# Patient Record
Sex: FEMALE | Race: BLACK OR AFRICAN AMERICAN | NOT HISPANIC OR LATINO | Employment: OTHER | URBAN - NONMETROPOLITAN AREA
[De-identification: names, ages, dates, MRNs, and addresses within clinical notes are randomized per-mention and may not be internally consistent; named-entity substitution may affect disease eponyms.]

---

## 2017-01-23 ENCOUNTER — TELEPHONE (OUTPATIENT)
Dept: ORTHOPEDIC SURGERY | Facility: CLINIC | Age: 82
End: 2017-01-23

## 2017-01-23 ENCOUNTER — OFFICE VISIT (OUTPATIENT)
Dept: ORTHOPEDIC SURGERY | Facility: CLINIC | Age: 82
End: 2017-01-23

## 2017-01-23 VITALS — HEIGHT: 60 IN | BODY MASS INDEX: 34.55 KG/M2 | WEIGHT: 176 LBS

## 2017-01-23 DIAGNOSIS — M17.11 PRIMARY OSTEOARTHRITIS OF RIGHT KNEE: Primary | ICD-10-CM

## 2017-01-23 PROCEDURE — 99204 OFFICE O/P NEW MOD 45 MIN: CPT | Performed by: ORTHOPAEDIC SURGERY

## 2017-01-23 RX ORDER — HYDROCODONE BITARTRATE AND ACETAMINOPHEN 5; 325 MG/1; MG/1
1 TABLET ORAL EVERY 6 HOURS PRN
Qty: 30 TABLET | Refills: 0 | Status: SHIPPED | OUTPATIENT
Start: 2017-01-23 | End: 2017-03-25 | Stop reason: HOSPADM

## 2017-01-23 NOTE — MR AVS SNAPSHOT
Rebecca Peterson   2017 1:45 PM   Office Visit    Dept Phone:  [phone]   Encounter #:  [id number]    Provider:  Eleazar Head MD   Department:  [facility]                Your Full Care Plan              Today's Medication Changes          These changes are accurate as of: 17  3:04 PM.  If you have any questions, ask your nurse or doctor.               New Medication(s)Ordered:     HYDROcodone-acetaminophen 5-325 MG per tablet   Commonly known as:  NORCO   Take 1 tablet by mouth Every 6 (Six) Hours As Needed for moderate pain (4-6).   Started by:  Eleazar Head MD            Where to Get Your Medications      You can get these medications from any pharmacy     Bring a paper prescription for each of these medications     HYDROcodone-acetaminophen 5-325 MG per tablet                  Your Updated Medication List          This list is accurate as of: 17  3:04 PM.  Always use your most recent med list.                HYDROcodone-acetaminophen 5-325 MG per tablet   Commonly known as:  NORCO   Take 1 tablet by mouth Every 6 (Six) Hours As Needed for moderate pain (4-6).               Instructions     None    Patient Instructions History      Upcoming Appointments     Visit Type Date Time Department    NEW PATIENT 2017  1:45 PM [facility]    FOLLOW UP 2/15/2017  3:45 PM [facility]      ESCAPESwithYOUhart Signup     [facility] Listar allows you to send messages to your doctor, view your test results, renew your prescriptions, schedule appointments, and more. To sign up, go to [employer] and click on the Sign Up Now link in the New User? box. Enter your Listar Activation Code exactly as it appears below along with the last four digits of your Social Security Number and your Date of Birth () to complete the sign-up process. If you do not sign up before the expiration date, you must request a new  "code.    Wall[employer] Activation Code: [email or web address]  Expires: 2/6/2017  3:04 PM    If you have questions, you can email [email or web address] or call [phone] to talk to our Wall[employer] staff. Remember, Wallmobhart is NOT to be used for urgent needs. For medical emergencies, dial 911.               Other Info from Your Visit           Your Appointments     Feb 15, 2017  3:45 PM EST   Follow Up with Eleazar Head MD   [facility] (--)    [address] [place] [address]   [phone]           Arrive 15 minutes prior to appointment.              Allergies     No Known Allergies      Reason for Visit     Right Knee - Pain, Edema           Vital Signs     Height Weight Body Mass Index Smoking Status          60\" (152.4 cm) 176 lb (79.8 kg) 34.37 kg/m2 Never Smoker          "

## 2017-01-23 NOTE — TELEPHONE ENCOUNTER
Patient family is asking if we can send in a order to [facility] to come to the patients home to help. Patient is wanting surgery as soon as possible they are willing to have it done at [facility] to speed up the insurance process they understand that [facility] has certain guidelines for Medicare.

## 2017-01-23 NOTE — PROGRESS NOTES
"Chief Complaint   Patient presents with   • Right Knee - Pain, Edema   Patient is here today after a visit to the [facility] Over the weekend. She states that she was trying to get into a car last Thursday and heard her right knee \"POP\" and then it started to swell and become tight. It has gotten worse and is very painful. She cannot bear weight.          HPI patient is a 85-year-old -American patient is having increasing pain and discomfort with both her knees.  The right is more symptomatic than left.  Her symptoms have been going on for at least 5 years and she has been reluctant to see any physician or seek any medical care.  She is progressively getting limited in her mobility and is getting confined to the wheelchair.  She describes her pain is very severe and on the lateral aspect the knee.  She is unable to fully extend her knee.  She cannot flex her knee beyond 90°.  She has not fallen recently but all of a sudden this weekend developed sharp, excruciating pain on the lateral aspect the knee.  She was unable to get out of her wheelchair or bear any weight whatsoever.  She was evaluated in the emergency room and a duplex Doppler scan for DVT was negative as well.  She states that her quality of life is miserable and she is so dependent on of the people for her activities of daily living that she would rather not so for this in the [place] die.  I have reassured her that the knee arthritis and cause severe pain and it is very eminently fixable with a knee arthroplasty.  She states that her mind is very clear and she wants to go ahead with the surgery ASAP to minimize her disability from this debilitating disease in her knees.          Allergies no known allergies      Social History     Social History   • Marital status:      Spouse name: N/A   • Number of children: N/A   • Years of education: N/A     Occupational History   • Not on file.     Social History Main Topics   • Smoking status: " Never Smoker   • Smokeless tobacco: Not on file   • Alcohol use No   • Drug use: Not on file   • Sexual activity: Not on file     Other Topics Concern   • Not on file     Social History Narrative   • No narrative on file       No family history on file.    No past surgical history on file.    No past medical history on file.        There were no vitals filed for this visit.          Review of Systems   Constitutional: Negative for chills, diaphoresis, fever and unexpected weight change.   HENT: Negative for hearing loss, nosebleeds, sore throat and tinnitus.    Eyes: Negative for pain and visual disturbance.   Respiratory: Negative for cough, shortness of breath and wheezing.    Cardiovascular: Negative for chest pain and palpitations.   Gastrointestinal: Negative for abdominal pain, diarrhea, nausea and vomiting.   Endocrine: Negative for cold intolerance, heat intolerance and polydipsia.   Genitourinary: Negative for difficulty urinating, dysuria and hematuria.   Musculoskeletal: Negative for arthralgias, joint swelling and myalgias.   Skin: Negative for rash and wound.   Allergic/Immunologic: Negative for environmental allergies.   Neurological: Negative for dizziness, syncope and numbness.   Hematological: Does not bruise/bleed easily.   Psychiatric/Behavioral: Negative for dysphoric mood and sleep disturbance. The patient is not nervous/anxious.            Physical Exam   Constitutional: She is oriented to person, place, and time. She appears well-developed and well-nourished.   HENT:   Head: Normocephalic.   Eyes: Conjunctivae are normal. Pupils are equal, round, and reactive to light.   Neck: Normal range of motion. Neck supple. No JVD present.   Cardiovascular: Normal rate, normal heart sounds and intact distal pulses.    Pulmonary/Chest: Effort normal and breath sounds normal. No respiratory distress.   Abdominal: Soft. Bowel sounds are normal. There is no tenderness. There is no guarding.    Lymphadenopathy:     She has no cervical adenopathy.   Neurological: She is alert and oriented to person, place, and time. She has normal reflexes.   Skin: Skin is warm and dry.   Psychiatric: Her behavior is normal. Judgment and thought content normal.   Vitals reviewed.              Joint/Body Part Specific Exam:  right knee. Positive grind test. Pain and tenderness is present over the lateral aspect of the knee. Patient has some tenderness and irritability of the common peroneal nerve. Lateral joint line is exquisitely painful and tender. Patella is tending to track a little bit laterally. There is thickening of the synovial membrane. Range of motion in flexion is 0-90 degrees. Dorsalis pedis and posterior tibial artery pulses are palpable. Common peroneal nerve function is well preserved. Gait is cautious and antalgic. The patient has valgus orientation of the knee with a high degree of external rotation than the contralateral side.  Patient is in severe pain when any attempt to move the leg causes her to have a lot of pain and discomfort.  I would not be surprised if she has a subclinical fracture of either the proximal tibia or the distal femur because off the poor quality of the bone and I have discussed that with the family.          X-RAY Report:  Films from the emergency room at the hospital show complete loss of lateral joint space with osteophyte formation.  She has bone-on-bone appearance.  The possibility of a hairline fracture cannot be ruled out.  The patellofemoral distance is completely obliterated.          Diagnostics:            Rebecca was seen today for pain and edema.    Diagnoses and all orders for this visit:    Primary osteoarthritis of right knee    Other orders  -     HYDROcodone-acetaminophen (NORCO) 5-325 MG per tablet; Take 1 tablet by mouth Every 6 (Six) Hours As Needed for moderate pain (4-6).          Procedures          I provided this patient with educational materials  regarding exercise and bone health.        Plan:   Patient has had severe joint pain in the right knee with limited range of motion and progressively getting confined to the wheelchair.    Patient and her family want to proceed with a right total knee arthroplasty.    Risks are high and I have discussed those with the patient and her family in great detail.    Time spent in the office with the patient discussing pros and cons of surgical intervention and making the decision today is 45 minutes.    We will schedule a total knee arthroplasty for this patient.    The patient was seen in the office today for preoperative discussion.  The patient has been tried on over-the-counter and prescription NSAID's despite the risks of anti-inflammatory bleeding, peptic ulcers and erosive gastritis with short term benefit only.  Braces have been prescribed for mechanical support.  Patient has been participating in an exercise program specifically targeting joint pain relief with limited benefit. Intraarticular injections have been used periodically with some but not complete relief of pain.  Ambulation aids have also been utilized.      The details of the surgical procedure were explained including the location of probable incisions and a description of the likely hardware/grafts to be used. The patient understands the likely convalescence after surgery as well as the rehabilitation required.  Also, we have thoroughly discussed with the patient the risks, benefits and alternatives to surgery.  Risks include but are not limited to the risk of infection, joint stiffness, limited range of motion, wound healing problems, scar tissue build up, myocardial infarction, stroke, blood clots (including DVT and/or pulmonary embolus along with the risk of death) neurologic and/or vascular injury, limb length discrepancy, fracture, dislocation, nonunion, malunion, continued pain and need for further surgery including hardware failure requiring  revision.     Controlled substance treatment options discussed in detail. Patient's signed consent to medical options on file. RAMONA form in chart. Risks of narcotic medication usage outlined.  Possibility of physical and psychological dependence and abuse, especially long term, emphasized to the patient.      CC To Pollo Dahl MD

## 2017-01-25 ENCOUNTER — TELEPHONE (OUTPATIENT)
Dept: ORTHOPEDIC SURGERY | Facility: CLINIC | Age: 82
End: 2017-01-25

## 2017-01-25 NOTE — TELEPHONE ENCOUNTER
Patient;s daughter called and states that they are wanting to know when surgery is scheduled. She states that with it being Medicare she would rather the surgery be done in [place] if it will be approved sooner. It appears the note isn;t completed yet and there is no referral yet for surgery. She is also asking for an order for a transport chair. She states that she checked with the patient's PCP and they said since you are the one treating her it needs to come from you. Please advise/rj.

## 2017-01-28 RX ORDER — CEFAZOLIN SODIUM 2 G/100ML
2 INJECTION, SOLUTION INTRAVENOUS ONCE
Status: CANCELLED | OUTPATIENT
Start: 2017-01-28 | End: 2017-01-28

## 2017-01-28 NOTE — TELEPHONE ENCOUNTER
I have entered orders for the patient's right total knee arthroplasty.  Please let her and her family know.

## 2017-01-30 ENCOUNTER — TELEPHONE (OUTPATIENT)
Dept: ORTHOPEDIC SURGERY | Facility: CLINIC | Age: 82
End: 2017-01-30

## 2017-01-30 NOTE — TELEPHONE ENCOUNTER
Patient's PCP needs to do that because the Lasix need to be associated with potassium and blood work especially Chem-7 to make sure she does not get electrolyte imbalance

## 2017-01-30 NOTE — TELEPHONE ENCOUNTER
Spoke to the family regarding Lasix and the need for the PCP to order and manager if indeed the patient needs it.

## 2017-01-31 DIAGNOSIS — M17.11 PRIMARY OSTEOARTHRITIS OF RIGHT KNEE: Primary | ICD-10-CM

## 2017-02-15 ENCOUNTER — OFFICE VISIT (OUTPATIENT)
Dept: ORTHOPEDIC SURGERY | Facility: CLINIC | Age: 82
End: 2017-02-15

## 2017-02-15 DIAGNOSIS — M17.11 PRIMARY OSTEOARTHRITIS OF RIGHT KNEE: Primary | ICD-10-CM

## 2017-02-15 PROCEDURE — 99214 OFFICE O/P EST MOD 30 MIN: CPT | Performed by: ORTHOPAEDIC SURGERY

## 2017-02-15 NOTE — PROGRESS NOTES
Chief Complaint   Patient presents with   • Right Knee - Follow-up   Patient is here today to discuss surgery. She is scheduled for a right total knee replacement in March.      HPI patient has a lot of pain and discomfort on the medial aspect of the right knee.  The knee anaya and gives out from underneath her.  She is fallen several different times.  She has difficulty in going up and down the steps.  Difficulty with squatting on the ground.  She has a sense of tightness in the knee when she gets an effusion and is unable to squat on the ground.  She's tried intra-articular injections, physical therapy, and anti-inflammatory medication, a brace but still cannot get adequate relief of her symptoms.  She is now looking for more durable relief of symptoms to enjoy her quality of life and wants to proceed with a total knee arthroplasty.  She does understand that she is 85 and the risks off a procedure of this magnitude are substantial but she feels like it is worth it for her.            There were no vitals filed for this visit.        Review of Systems   Constitutional: Negative for chills, diaphoresis, fever and unexpected weight change.   HENT: Negative for hearing loss, nosebleeds, sore throat and tinnitus.    Eyes: Negative for pain and visual disturbance.   Respiratory: Negative for cough, shortness of breath and wheezing.    Cardiovascular: Negative for chest pain and palpitations.   Gastrointestinal: Negative for abdominal pain, diarrhea, nausea and vomiting.   Endocrine: Negative for cold intolerance, heat intolerance and polydipsia.   Genitourinary: Negative for difficulty urinating, dysuria and hematuria.   Musculoskeletal: Negative for arthralgias, joint swelling and myalgias.   Skin: Negative for rash and wound.   Allergic/Immunologic: Negative for environmental allergies.   Neurological: Negative for dizziness, syncope and numbness.   Hematological: Does not bruise/bleed easily.    Psychiatric/Behavioral: Negative for dysphoric mood and sleep disturbance. The patient is not nervous/anxious.            Physical Exam   Constitutional: She is oriented to person, place, and time. She appears well-developed and well-nourished.   HENT:   Head: Normocephalic.   Eyes: Conjunctivae are normal. Pupils are equal, round, and reactive to light.   Neck: Normal range of motion. Neck supple. No JVD present.   Cardiovascular: Normal rate, normal heart sounds and intact distal pulses.    Pulmonary/Chest: Effort normal and breath sounds normal. No respiratory distress.   Abdominal: Soft. Bowel sounds are normal. There is no tenderness. There is no guarding.   Lymphadenopathy:     She has no cervical adenopathy.   Neurological: She is alert and oriented to person, place, and time. She has normal reflexes.   Skin: Skin is warm and dry.   Psychiatric: She has a normal mood and affect. Her behavior is normal. Judgment and thought content normal.   Vitals reviewed.          Joint/Body Part Specific Exam:  right knee. Patient has crepitus throughout range of motion. Positive patellar grind test. Mild effusion. Lachman is negative. Pivot shift is negative. Anterior and posterior drawer signs are negative. Significant joint line tenderness is noted on the medial aspect of the knee. Patient has a varus orientation of the knee. Range of motion in flexion is for 0- 110° degrees. Neurovascular status intact.  Dorsalis pedis and posterior tibial artery pulses are palpable. Common peroneal nerve function is well preserved. Patients gait is cautious and antalgic. Skin and soft tissues are swollen; consistent with synovitis and effusion      X-RAY Report:  Prior films show complete loss of medial joint space with varus alignment.  Osteophyte formation is noted.  There is subchondral cyst formation with some osteopenia.      Diagnostics:        Rebecca was seen today for follow-up.    Diagnoses and all orders for this  visit:    Primary osteoarthritis of right knee          Procedures        Plan:  Patient has tried every form of conservative nonoperative care now wants to proceed with a total knee arthroplasty in March 2017.    Use a supportive brace to the knee.    Tablet ibuprofen 600 mg tab 1 by mouth twice a day when necessary pain and discomfort.    Schedule a right total knee arthroplasty.    The patient was seen in the office today for preoperative discussion.  The patient has been tried on over-the-counter and prescription NSAID's despite the risks of anti-inflammatory bleeding, peptic ulcers and erosive gastritis with short term benefit only.  Braces have been prescribed for mechanical support.  Patient has been participating in an exercise program specifically targeting joint pain relief with limited benefit. Intraarticular injections have been used periodically with some but not complete relief of pain.  Ambulation aids have also been utilized.      The details of the surgical procedure were explained including the location of probable incisions and a description of the likely hardware/grafts to be used. The patient understands the likely convalescence after surgery as well as the rehabilitation required.  Also, we have thoroughly discussed with the patient the risks, benefits and alternatives to surgery.  Risks include but are not limited to the risk of infection, joint stiffness, limited range of motion, wound healing problems, scar tissue build up, myocardial infarction, stroke, blood clots (including DVT and/or pulmonary embolus along with the risk of death) neurologic and/or vascular injury, limb length discrepancy, fracture, dislocation, nonunion, malunion, continued pain and need for further surgery including hardware failure requiring revision.     Time spent in the office today with the patient making the surgical decision and discussing risks and benefits 30 minutes

## 2017-03-08 NOTE — PROGRESS NOTES
Pre-Operative Orthopedic Assessment      Patient: Rebecca Peterson    YOB: 1931      Age/Gender: 85 y.o. female  Medical Record Number: [id number]  Surgical Procedure Planned: ID TOTAL KNEE ARTHROPLASTY [18257] (TOTAL KNEE ARTHROPLASTY with [place] [facility])     Surgeon: Eleazar Head MD    Date of Surgery Planned: 3/21/2017    Question Value Score    Patient Score   1. What is your age group? 50-65 years  66-75 years  >75 years =2  =1  =0 0   2. Gender? Male  Female =2  =1 1   3. How far on average can you walk? (a block is 200 meters) Two blocks or more (+\- rest)  1-2 blocks (+\- rest)  Housebound (most of time) =2  =1  =0 0   4. Which gait aid to you use? (more often than not) None  Single-Point Stick  Crutches/Frame =2  =1  =0 0   5. Do you use community  supports? (home help, meals on wheels, district nursing) None or one per week  Two or more per week =1  =0 1   6. Will you live with someone who can care for you after your operation? Yes  No =3  =0 3      Your Score (out of 12)  5     Key Destination at Discharge from acute care predicted by score   Scores < 6  -- extended inpatient rehabilitation   Scores 6-9 -- additional intervention to discharge directly home (Rehabilitation in home)   Scores > 9 -- directly home         Discharge Disposition/Planning:     Patient Response   Discussed the Predicted discharge disposition needed based on RAPT Assessment with the patient.    yes   Patient selected discharge disposition:   Home vs SNF ([facility])   Out Patient Rehabilitation Facility of Choice:    n/a   Home Health Services Preferred:   undecided   Has the patient completed or been scheduled to complete pre-operative testing? Scheduled for 3/14/17   Post-Operative Care Giver Name and Phone Number:    Reta Titus [phone]     Subacute Inpatient Rehabilitation:  Complete this section only if planning inpatient services at a Subacute Facility     Patient Response    Subacute Facility Preferred (Please list 2 facilities:   [facility] (plans to pre register)   Requires pre-certification for inpatient rehabilitation services?      No (Medicare)   Planned source of transportation to inpatient rehabilitation facility?   undecided    If choosing inpatient services at an Acute or Subacute Facility please list a subsequent back-up plan (in case patient fails to qualify for inpatient rehabilitation). Back-up plans should include caregiver (family member or friend) for first 24-48 post- -operatively.    yes   Home Equipment Patient Response   Does patient have a walker for home use?    yes   Does patient have a 3 in 1 commode for home use?    yes   Does patient have a shower chair for home use?    yes   Does patient have an elevated commode seat for home use? Bedside commode   Does patient have a cane for home use?    yes   Is there any other medical equipment in the home? If so,  List in comment section below wheelchair   Pre-Operative Class Attendance Patient Response   Attended or scheduled to attend the pre-operative class within 1 year of total joint replacement? Provided info, was unaware of class, plans to attend on day of PAT   Not planning to attend the pre-operative class    n/a   Has attended the pre-operative class > 1 year ago    n/a   Does not want to attend the class    n/a   Was misinformed that did not need to attend the class    n/a   Class time is not convenient    n/a   Other reasons for refusing to attend the pre-operative class (if yes, see comments below)   n/a   Patient Education  Completed   Expected time of discharge discussed yes   Encouraged to attend Pre-Operative Class    yes   Education re: Back-up plan for patients planning discharge to subacute facilities yes   Education re: Predicted Discharge Disposition based on RAPT score    yes   Patient receptive and voiced understanding of information given    yes                                                                                                             Comments:    Spoke with patient dtr Shiloh Sheffield ( [phone] ).  She is noted on [facility] consent in Media.  She informs that patient does live at [address] [place] [address].  Single level house with 1 step to the entry.  No basement.  Uses a walker and a cane, inside the house, mostly the walker.  They do also have a wheelchair that they have been using if they take the patient out.  She stated that they have been using a wheelchair transport van recently since patient having knee pain, as riding in a tight car has been very uncomfortable for patient. Shiloh informs that Reta Titus (dtr) (lives with patient) and will be staying with patient in hospital, and also able to help her at discharge.  She mentioned possibly needing a hospital bed at discharge/would prefer to use [facility].  Also mentioned that they have also considered a short time rehab stay at [facility].  Recommended to Shiloh to pre register prior to surgery if they are possibly considering [facility]- provided her contact # for [facility] [address] [id number] [phone].  Current discharge plan is home with assist of family and home health (would need their preference on HH agency- Shiloh informs that she has spoken with someone already, but is unsure of name of agency at this time, VS going to [facility].  CCP will follow backup with patient/family re discharge planning after OR.  Mila Rogel RN                                               Signature: Mila Rogel RN    Date:  3/8/2017

## 2017-03-14 ENCOUNTER — APPOINTMENT (OUTPATIENT)
Dept: PREADMISSION TESTING | Facility: HOSPITAL | Age: 82
End: 2017-03-14

## 2017-03-14 VITALS
DIASTOLIC BLOOD PRESSURE: 72 MMHG | TEMPERATURE: 98.1 F | HEIGHT: 60 IN | BODY MASS INDEX: 32.53 KG/M2 | HEART RATE: 91 BPM | RESPIRATION RATE: 16 BRPM | SYSTOLIC BLOOD PRESSURE: 137 MMHG | WEIGHT: 165.7 LBS | OXYGEN SATURATION: 99 %

## 2017-03-14 LAB
ANION GAP SERPL CALCULATED.3IONS-SCNC: 14.3 MMOL/L
BILIRUB UR QL STRIP: NEGATIVE
BUN BLD-MCNC: 17 MG/DL (ref 8–23)
BUN/CREAT SERPL: 23.6 (ref 7–25)
CALCIUM SPEC-SCNC: 10.1 MG/DL (ref 8.6–10.5)
CHLORIDE SERPL-SCNC: 103 MMOL/L (ref 98–107)
CLARITY UR: CLEAR
CO2 SERPL-SCNC: 23.7 MMOL/L (ref 22–29)
COLOR UR: YELLOW
CREAT BLD-MCNC: 0.72 MG/DL (ref 0.57–1)
DEPRECATED RDW RBC AUTO: 46.5 FL (ref 37–54)
ERYTHROCYTE [DISTWIDTH] IN BLOOD BY AUTOMATED COUNT: 15 % (ref 11.7–13)
GFR SERPL CREATININE-BSD FRML MDRD: 93 ML/MIN/1.73
GLUCOSE BLD-MCNC: 213 MG/DL (ref 65–99)
GLUCOSE UR STRIP-MCNC: NEGATIVE MG/DL
HCT VFR BLD AUTO: 34.1 % (ref 35.6–45.5)
HGB BLD-MCNC: 10.7 G/DL (ref 11.9–15.5)
HGB UR QL STRIP.AUTO: NEGATIVE
KETONES UR QL STRIP: NEGATIVE
LEUKOCYTE ESTERASE UR QL STRIP.AUTO: NEGATIVE
MCH RBC QN AUTO: 26.6 PG (ref 26.9–32)
MCHC RBC AUTO-ENTMCNC: 31.4 G/DL (ref 32.4–36.3)
MCV RBC AUTO: 84.6 FL (ref 80.5–98.2)
NITRITE UR QL STRIP: NEGATIVE
PH UR STRIP.AUTO: 6 [PH] (ref 5–8)
PLATELET # BLD AUTO: 311 10*3/MM3 (ref 140–500)
PMV BLD AUTO: 8.4 FL (ref 6–12)
POTASSIUM BLD-SCNC: 4.1 MMOL/L (ref 3.5–5.2)
PROT UR QL STRIP: NEGATIVE
RBC # BLD AUTO: 4.03 10*6/MM3 (ref 3.9–5.2)
SODIUM BLD-SCNC: 141 MMOL/L (ref 136–145)
SP GR UR STRIP: 1.02 (ref 1–1.03)
UROBILINOGEN UR QL STRIP: NORMAL
WBC NRBC COR # BLD: 8.32 10*3/MM3 (ref 4.5–10.7)

## 2017-03-14 PROCEDURE — 80048 BASIC METABOLIC PNL TOTAL CA: CPT | Performed by: ORTHOPAEDIC SURGERY

## 2017-03-14 PROCEDURE — G8978 MOBILITY CURRENT STATUS: HCPCS | Performed by: PHYSICAL THERAPIST

## 2017-03-14 PROCEDURE — 36415 COLL VENOUS BLD VENIPUNCTURE: CPT

## 2017-03-14 PROCEDURE — G8979 MOBILITY GOAL STATUS: HCPCS | Performed by: PHYSICAL THERAPIST

## 2017-03-14 PROCEDURE — G8980 MOBILITY D/C STATUS: HCPCS | Performed by: PHYSICAL THERAPIST

## 2017-03-14 PROCEDURE — 93005 ELECTROCARDIOGRAM TRACING: CPT

## 2017-03-14 PROCEDURE — 97161 PT EVAL LOW COMPLEX 20 MIN: CPT | Performed by: PHYSICAL THERAPIST

## 2017-03-14 PROCEDURE — 81003 URINALYSIS AUTO W/O SCOPE: CPT | Performed by: ORTHOPAEDIC SURGERY

## 2017-03-14 PROCEDURE — 85027 COMPLETE CBC AUTOMATED: CPT | Performed by: ORTHOPAEDIC SURGERY

## 2017-03-14 RX ORDER — TRIAMTERENE AND HYDROCHLOROTHIAZIDE 37.5; 25 MG/1; MG/1
1 TABLET ORAL
COMMUNITY
End: 2021-06-02 | Stop reason: HOSPADM

## 2017-03-14 RX ORDER — LOSARTAN POTASSIUM 100 MG/1
50 TABLET ORAL
COMMUNITY

## 2017-03-14 RX ORDER — PANTOPRAZOLE SODIUM 20 MG/1
20 TABLET, DELAYED RELEASE ORAL AS NEEDED
COMMUNITY

## 2017-03-14 RX ORDER — AMLODIPINE BESYLATE 2.5 MG/1
2.5 TABLET ORAL
COMMUNITY

## 2017-03-14 RX ORDER — SIMVASTATIN 40 MG
40 TABLET ORAL NIGHTLY
Status: ON HOLD | COMMUNITY
End: 2021-06-01 | Stop reason: SDUPTHER

## 2017-03-14 NOTE — DISCHARGE INSTRUCTIONS
PLEASE ARRIVE AT 10:30 AM ON 3/21/2017      Take the following medications the morning of surgery with a small sip of water.  NO MEDS        General Instructions:  • Do not eat or drink after midnight: includes water, mints, or gum. You may brush your teeth.  • Do not smoke, chew tobacco, or drink alcohol.  • The Pre-Admission Testing nurse will instruct you to bring medications if unable to obtain an accurate list in Pre-Admission Testing.    • If applicable bring your C-PAP/ BI-PAP machine.  • Bring any papers given to you in the doctor’s office.  • Wear clean comfortable clothes and socks.  • Do not wear contact lenses or make-up.  Bring a case for your glasses if applicable.   • Bring crutches or walker if applicable.  • Leave all other valuables and jewelry at home.    If you were given a blood bank ID arm band remember to bring it with you the day of surgery.    Preventing a Surgical Site Infection:  Shower on the morning of surgery using a fresh bar of anti-bacterial soap (such as Dial) and clean washcloth.  Dry with a clean towel and dress in clean clothing.  For 2 to 3 days before surgery, avoid shaving with a razor near where you will have surgery because the razor can irritate skin and make it easier to develop an infection  Ask your surgeon if you will be receiving antibiotics prior to surgery  Make sure you, your family, and all healthcare providers clean their hands with soap and water or an alcohol based hand  before caring for you or your wound  If at all possible, quit smoking as many days before surgery as you can.    Day of surgery:  Upon arrival, a Pre-op nurse and Anesthesiologist will review your health history, obtain vital signs, and answer questions you may have.  The only belongings needed at this time will be your home medications and if applicable your C-PAP/BI-PAP machine.  If you are staying overnight your family can leave the rest of your belongings in the car and bring them  to your room later.  A Pre-op nurse will start an IV and you may receive medication in preparation for surgery, including something to help you relax.  Your family will be able to see you in the Pre-op area.  While you are in surgery your family should notify the waiting room  if they leave the waiting room area and provide a contact phone number.    Please be aware that surgery does come with discomfort.  We want to make every effort to control your discomfort so please discuss any uncontrolled symptoms with your nurse.   Your doctor will most likely have prescribed pain medications.      If you are going home after surgery you will receive individualized written care instructions before being discharged.  A responsible adult must drive you to and from the hospital on the day of your surgery and stay with you for 24 hours.    If you are staying overnight following surgery, you will be transported to your hospital room following the recovery period.  [facility] has all private rooms.    If you have any questions please call Pre-Admission Testing at [phone].  Deductibles and co-payments are collected on the day of service. Please be prepared to pay the required co-pay, deductible or deposit on the day of service as defined by your plan.    2% CHLORAHEXIDINE GLUCONATE* CLOTH  Preparing or “prepping” skin before surgery can reduce the risk of infection at the surgical site. To make the process easier, [facility] has chosen disposable cloths moistened with a rinse-free, 2% Chlorhexidine Gluconate (CHG) antiseptic solution. The steps below outline the prepping process and should be carefully followed.        Use the prep cloth on the area that is circled in the diagram             Directions Night before Surgery  1) Shower using a fresh bar of anti-bacterial soap (such as Dial) and clean washcloth.  Use a clean towel to completely dry your skin.  2) Do not use any lotions, oils  or creams on your skin.  3) Open the package and remove 1 cloth, wipe your skin for 30 seconds in a circular motion.  Allow to dry for 3 minutes.  4) Repeat #3 with second cloth.  5) Do not touch your eyes, ears, or mouth with the prep cloth.  6) Allow the wet prep solution to air dry.  7) Discard the prep cloth and wash your hands with soap and water.   8) Dress in clean bed clothes and sleep on fresh clean bed sheets.   9) You may experience some temporary itching after the prep.    Directions Day of Surgery  1) Repeat steps 1,2,3,4,5,6,7, and 9.   2) Dress in clean clothes before coming to the hospital.    BACTROBAN NASAL OINTMENT  There are many germs normally in your nose. Bactroban is an ointment that will help reduce these germs. Please follow these instructions for Bactroban use:        __1ST__The day before surgery in the morning  Date___3/20_____    _2ND___The day before surgery in the evening              Date___3/20_____    _3RD___The day of surgery in the morning    Date___3/21_____    **Squirt ½ package of Bactroban Ointment onto a cotton applicator and apply to inside of 1st nostril.  Squirt the remaining Bactroban and apply to the inside of the other nostril.

## 2017-03-14 NOTE — PROGRESS NOTES
Physical Therapy Outpatient Preoperative Total Joint Evaluation     Patient Name: Rebecca Peterson  : 1931  MRN: [id number]  Today's Date: 3/14/2017         Surgery Date: 17    There is no problem list on file for this patient.       No past medical history on file.     No past surgical history on file.           TOTAL JOINT PREOP EVAL (last 72 hours)      Total Joint Preop Evaluation       17 1038                Preoperative Evaluation    Surgery TKR: Right  -KT        Surgery Date 17  -KT        Attended Class yes  -KT        Medical History DM;HTN  -KT        Prior Activity Status    All Household moderate assist  -KT        All Community moderate assist  -KT        Gait minimal assist  -KT        Transfers independent  -KT         Child  -KT        DC Plans Skilled Nursing   [facility]  -KT        Assist at home Child  -KT        Home Environment 1 story  -KT        Exterior steps --   1  -KT        Pain 0-10    Pain Level 5  -KT        LE Measurements - ROM    Hip Flexion - Right Involved;AROM is WNL;Exceptions (see comments)  -KT        Right Hip Flexion Comments 3+/5  -KT        Hip Abduction - Right Involved;AROM is WNL;Exceptions (see comments)  -KT        Right Hip Abduction Comments 3+/5  -KT        Knee Flexion - Right Involved;Exceptions (see comments)  -KT        Right Knee Flexion Comments 95 deg; 3+/5  -KT        Knee Extension - Right Involved;Exceptions (see comments)  -KT        Right Knee Extension Comments 25 deg; 3/5  -KT        Hip Flexion - Left Uninvolved;Exceptions (see comments)  -KT        Left Hip Flexion Comments 3+/5  -KT        Hip Abduction - Left Uninvolved;Exceptions (see comments)  -KT        Left Hip Abduction Comments 3+/5  -KT        Knee Flexion - Left Uninvolved;Exceptions (see comments)  -KT        Left Knee Flexion Comments 3+/5  -KT        Knee Extension - Left Uninvolved;Exceptions (see comments)  -KT        Left Knee Extension  Comments 3+/5  -KT        UE ROM/Strength    UE ROM/Strength adequate for walker use  -KT        Other Measurements    Gait Observation walker  -KT        Genu Valgus/Varus Right:;Left:;Valgus  -KT        Genu Valgus/Varus Comment severe valgus; pronated feet  -KT        Equipment    Equipment Patient Has Walker;Bedside commode;Cane  -KT        ASSESSMENT    Goal Patient demonstrates good understanding of post-op P.T.;Surgical Procedure;Exercises  -KT        Goal Met? Yes  -KT        Anticipated Progress good;fair  -KT        Patient agrees with Plan of Treatment? Yes  -KT        Plan Will see for post op TJR protocol  -KT          User Key  (r) = Recorded By, (t) = Taken By, (c) = Cosigned By    Initials Name Effective Dates    KT Sylvia Page, PT 03/26/15 -              Time Calculation:          Therapy Charges for Today     Code Description Service Date Service Provider Modifiers Qty    69370421042 HC PT MOBILITY CURRENT 3/14/2017 Sylvia Page, PT GP, CL 1    17051673062 HC PT MOBILITY PROJECTED 3/14/2017 Sylvia Page, PT GP, CL 1    70974997087 HC PT MOBILITY DISCHARGE 3/14/2017 Sylvia Page, PT GP, CL 1    30628201791 HC PAT-TOTAL JOINT CLASS 3/14/2017 Sylvia Page, PT  1    93843063125 HC PT EVAL LOW COMPLEXITY 1 3/14/2017 Sylvia Page, PT GP 1            PT G-Codes  PT Professional Judgement Used?: Yes  Functional Limitation: Mobility: Walking and moving around  Mobility: Walking and Moving Around Current Status (): At least 60 percent but less than 80 percent impaired, limited or restricted  Mobility: Walking and Moving Around Goal Status (): At least 60 percent but less than 80 percent impaired, limited or restricted  Mobility: Walking and Moving Around Discharge Status (): At least 60 percent but less than 80 percent impaired, limited or restricted      Sylvia Page, PT  3/14/2017

## 2017-03-21 ENCOUNTER — HOSPITAL ENCOUNTER (INPATIENT)
Facility: HOSPITAL | Age: 82
LOS: 4 days | Discharge: NURSING, REHAB OR LONG-TERM CARE | End: 2017-03-25
Attending: ORTHOPAEDIC SURGERY | Admitting: ORTHOPAEDIC SURGERY

## 2017-03-21 ENCOUNTER — ANESTHESIA (OUTPATIENT)
Dept: PERIOP | Facility: HOSPITAL | Age: 82
End: 2017-03-21

## 2017-03-21 ENCOUNTER — ANESTHESIA EVENT (OUTPATIENT)
Dept: PERIOP | Facility: HOSPITAL | Age: 82
End: 2017-03-21

## 2017-03-21 ENCOUNTER — APPOINTMENT (OUTPATIENT)
Dept: GENERAL RADIOLOGY | Facility: HOSPITAL | Age: 82
End: 2017-03-21

## 2017-03-21 DIAGNOSIS — M17.11 PRIMARY OSTEOARTHRITIS OF RIGHT KNEE: ICD-10-CM

## 2017-03-21 DIAGNOSIS — R26.2 DIFFICULTY WALKING: Primary | ICD-10-CM

## 2017-03-21 PROBLEM — R01.1 CARDIAC MURMUR: Status: ACTIVE | Noted: 2017-03-21

## 2017-03-21 PROBLEM — E11.9 DIABETES MELLITUS (HCC): Status: ACTIVE | Noted: 2017-03-21

## 2017-03-21 PROBLEM — R60.9 DEPENDENT EDEMA: Status: ACTIVE | Noted: 2017-03-21

## 2017-03-21 PROBLEM — I10 HYPERTENSION: Status: ACTIVE | Noted: 2017-03-21

## 2017-03-21 LAB
GLUCOSE BLDC GLUCOMTR-MCNC: 129 MG/DL (ref 70–130)
GLUCOSE BLDC GLUCOMTR-MCNC: 209 MG/DL (ref 70–130)
GLUCOSE BLDC GLUCOMTR-MCNC: 264 MG/DL (ref 70–130)
HCT VFR BLD AUTO: 32 % (ref 35.6–45.5)
HGB BLD-MCNC: 10.2 G/DL (ref 11.9–15.5)
INR PPP: 1.16 (ref 0.9–1.1)
PROTHROMBIN TIME: 14.3 SECONDS (ref 11.7–14.2)

## 2017-03-21 PROCEDURE — 93010 ELECTROCARDIOGRAM REPORT: CPT | Performed by: INTERNAL MEDICINE

## 2017-03-21 PROCEDURE — 25010000003 CEFAZOLIN IN DEXTROSE 2-4 GM/100ML-% SOLUTION: Performed by: ORTHOPAEDIC SURGERY

## 2017-03-21 PROCEDURE — 0SRC0J9 REPLACEMENT OF RIGHT KNEE JOINT WITH SYNTHETIC SUBSTITUTE, CEMENTED, OPEN APPROACH: ICD-10-PCS | Performed by: ORTHOPAEDIC SURGERY

## 2017-03-21 PROCEDURE — 82962 GLUCOSE BLOOD TEST: CPT

## 2017-03-21 PROCEDURE — 25010000002 MIDAZOLAM PER 1 MG: Performed by: ANESTHESIOLOGY

## 2017-03-21 PROCEDURE — 20985 CPTR-ASST DIR MS PX: CPT | Performed by: ORTHOPAEDIC SURGERY

## 2017-03-21 PROCEDURE — 63710000001 INSULIN ASPART PER 5 UNITS: Performed by: HOSPITALIST

## 2017-03-21 PROCEDURE — 25010000002 ROPIVACAINE PER 1 MG: Performed by: ORTHOPAEDIC SURGERY

## 2017-03-21 PROCEDURE — 85610 PROTHROMBIN TIME: CPT | Performed by: ORTHOPAEDIC SURGERY

## 2017-03-21 PROCEDURE — 25010000002 MEPIVACAINE PF 2 % SOLUTION 20 ML VIAL: Performed by: ANESTHESIOLOGY

## 2017-03-21 PROCEDURE — 85014 HEMATOCRIT: CPT | Performed by: ORTHOPAEDIC SURGERY

## 2017-03-21 PROCEDURE — 25010000002 METHYLPREDNISOLONE PER 125 MG: Performed by: ANESTHESIOLOGY

## 2017-03-21 PROCEDURE — 25010000002 ONDANSETRON PER 1 MG: Performed by: NURSE ANESTHETIST, CERTIFIED REGISTERED

## 2017-03-21 PROCEDURE — 25010000002 DEXAMETHASONE PER 1 MG: Performed by: NURSE ANESTHETIST, CERTIFIED REGISTERED

## 2017-03-21 PROCEDURE — C1776 JOINT DEVICE (IMPLANTABLE): HCPCS | Performed by: ORTHOPAEDIC SURGERY

## 2017-03-21 PROCEDURE — 25010000002 FENTANYL CITRATE (PF) 100 MCG/2ML SOLUTION: Performed by: NURSE ANESTHETIST, CERTIFIED REGISTERED

## 2017-03-21 PROCEDURE — 85018 HEMOGLOBIN: CPT | Performed by: ORTHOPAEDIC SURGERY

## 2017-03-21 PROCEDURE — 73560 X-RAY EXAM OF KNEE 1 OR 2: CPT

## 2017-03-21 PROCEDURE — 25010000002 FENTANYL CITRATE (PF) 100 MCG/2ML SOLUTION: Performed by: ANESTHESIOLOGY

## 2017-03-21 PROCEDURE — 93005 ELECTROCARDIOGRAM TRACING: CPT | Performed by: INTERNAL MEDICINE

## 2017-03-21 PROCEDURE — 25010000002 HYDROMORPHONE PER 4 MG: Performed by: NURSE ANESTHETIST, CERTIFIED REGISTERED

## 2017-03-21 PROCEDURE — 25010000002 ROPIVACAINE PER 1 MG: Performed by: ANESTHESIOLOGY

## 2017-03-21 PROCEDURE — C1713 ANCHOR/SCREW BN/BN,TIS/BN: HCPCS | Performed by: ORTHOPAEDIC SURGERY

## 2017-03-21 PROCEDURE — 25010000002 PROPOFOL 10 MG/ML EMULSION: Performed by: NURSE ANESTHETIST, CERTIFIED REGISTERED

## 2017-03-21 PROCEDURE — 25010000002 ONDANSETRON PER 1 MG: Performed by: ORTHOPAEDIC SURGERY

## 2017-03-21 PROCEDURE — 27447 TOTAL KNEE ARTHROPLASTY: CPT | Performed by: ORTHOPAEDIC SURGERY

## 2017-03-21 PROCEDURE — 99221 1ST HOSP IP/OBS SF/LOW 40: CPT | Performed by: INTERNAL MEDICINE

## 2017-03-21 DEVICE — EXT STEM FEM/KN PERSONA TPR 14XPLS30MM: Type: IMPLANTABLE DEVICE | Site: KNEE | Status: FUNCTIONAL

## 2017-03-21 DEVICE — COMP FEM/KN PERSONA CR CMT COCR STD SZ6 RT: Type: IMPLANTABLE DEVICE | Site: KNEE | Status: FUNCTIONAL

## 2017-03-21 DEVICE — TOTL KN FM CEM VE ZIMMER: Type: IMPLANTABLE DEVICE | Site: KNEE | Status: FUNCTIONAL

## 2017-03-21 DEVICE — PAT KN PERSONA VE CRS/LNK CMT 8.5X32MM: Type: IMPLANTABLE DEVICE | Site: KNEE | Status: FUNCTIONAL

## 2017-03-21 DEVICE — IMPLANTABLE DEVICE: Type: IMPLANTABLE DEVICE | Site: KNEE | Status: FUNCTIONAL

## 2017-03-21 DEVICE — STEM TIB/KN PERSONA CMT 5D SZD RT: Type: IMPLANTABLE DEVICE | Site: KNEE | Status: FUNCTIONAL

## 2017-03-21 RX ORDER — PROPOFOL 10 MG/ML
VIAL (ML) INTRAVENOUS AS NEEDED
Status: DISCONTINUED | OUTPATIENT
Start: 2017-03-21 | End: 2017-03-21 | Stop reason: SURG

## 2017-03-21 RX ORDER — MIDAZOLAM HYDROCHLORIDE 1 MG/ML
2 INJECTION INTRAMUSCULAR; INTRAVENOUS
Status: DISCONTINUED | OUTPATIENT
Start: 2017-03-21 | End: 2017-03-21 | Stop reason: HOSPADM

## 2017-03-21 RX ORDER — SODIUM CHLORIDE, SODIUM LACTATE, POTASSIUM CHLORIDE, CALCIUM CHLORIDE 600; 310; 30; 20 MG/100ML; MG/100ML; MG/100ML; MG/100ML
9 INJECTION, SOLUTION INTRAVENOUS CONTINUOUS
Status: DISCONTINUED | OUTPATIENT
Start: 2017-03-21 | End: 2017-03-22

## 2017-03-21 RX ORDER — PANTOPRAZOLE SODIUM 20 MG/1
20 TABLET, DELAYED RELEASE ORAL DAILY PRN
Status: DISCONTINUED | OUTPATIENT
Start: 2017-03-21 | End: 2017-03-25 | Stop reason: HOSPADM

## 2017-03-21 RX ORDER — ONDANSETRON 4 MG/1
4 TABLET, ORALLY DISINTEGRATING ORAL EVERY 6 HOURS PRN
Status: DISCONTINUED | OUTPATIENT
Start: 2017-03-21 | End: 2017-03-25 | Stop reason: HOSPADM

## 2017-03-21 RX ORDER — PROMETHAZINE HYDROCHLORIDE 25 MG/1
25 SUPPOSITORY RECTAL ONCE AS NEEDED
Status: DISCONTINUED | OUTPATIENT
Start: 2017-03-21 | End: 2017-03-21 | Stop reason: HOSPADM

## 2017-03-21 RX ORDER — ONDANSETRON 4 MG/1
4 TABLET, FILM COATED ORAL EVERY 6 HOURS PRN
Status: DISCONTINUED | OUTPATIENT
Start: 2017-03-21 | End: 2017-03-25 | Stop reason: HOSPADM

## 2017-03-21 RX ORDER — HYDROCODONE BITARTRATE AND ACETAMINOPHEN 7.5; 325 MG/1; MG/1
1 TABLET ORAL ONCE AS NEEDED
Status: DISCONTINUED | OUTPATIENT
Start: 2017-03-21 | End: 2017-03-21 | Stop reason: HOSPADM

## 2017-03-21 RX ORDER — PROMETHAZINE HYDROCHLORIDE 25 MG/ML
12.5 INJECTION, SOLUTION INTRAMUSCULAR; INTRAVENOUS ONCE AS NEEDED
Status: DISCONTINUED | OUTPATIENT
Start: 2017-03-21 | End: 2017-03-21 | Stop reason: HOSPADM

## 2017-03-21 RX ORDER — ACETAMINOPHEN 10 MG/ML
INJECTION, SOLUTION INTRAVENOUS AS NEEDED
Status: DISCONTINUED | OUTPATIENT
Start: 2017-03-21 | End: 2017-03-21 | Stop reason: SURG

## 2017-03-21 RX ORDER — CEFAZOLIN SODIUM 2 G/100ML
2 INJECTION, SOLUTION INTRAVENOUS EVERY 8 HOURS
Status: COMPLETED | OUTPATIENT
Start: 2017-03-21 | End: 2017-03-22

## 2017-03-21 RX ORDER — ONDANSETRON 2 MG/ML
4 INJECTION INTRAMUSCULAR; INTRAVENOUS EVERY 6 HOURS PRN
Status: DISCONTINUED | OUTPATIENT
Start: 2017-03-21 | End: 2017-03-25 | Stop reason: HOSPADM

## 2017-03-21 RX ORDER — PROMETHAZINE HYDROCHLORIDE 25 MG/1
25 TABLET ORAL ONCE AS NEEDED
Status: DISCONTINUED | OUTPATIENT
Start: 2017-03-21 | End: 2017-03-21 | Stop reason: HOSPADM

## 2017-03-21 RX ORDER — DOCUSATE SODIUM 100 MG/1
100 CAPSULE, LIQUID FILLED ORAL 2 TIMES DAILY PRN
Status: DISCONTINUED | OUTPATIENT
Start: 2017-03-21 | End: 2017-03-25 | Stop reason: HOSPADM

## 2017-03-21 RX ORDER — HYDROMORPHONE HYDROCHLORIDE 1 MG/ML
0.5 INJECTION, SOLUTION INTRAMUSCULAR; INTRAVENOUS; SUBCUTANEOUS
Status: DISCONTINUED | OUTPATIENT
Start: 2017-03-21 | End: 2017-03-25 | Stop reason: HOSPADM

## 2017-03-21 RX ORDER — FENTANYL CITRATE 50 UG/ML
50 INJECTION, SOLUTION INTRAMUSCULAR; INTRAVENOUS
Status: DISCONTINUED | OUTPATIENT
Start: 2017-03-21 | End: 2017-03-21 | Stop reason: HOSPADM

## 2017-03-21 RX ORDER — ROPIVACAINE HYDROCHLORIDE 2 MG/ML
INJECTION, SOLUTION EPIDURAL; INFILTRATION; PERINEURAL AS NEEDED
Status: DISCONTINUED | OUTPATIENT
Start: 2017-03-21 | End: 2017-03-21 | Stop reason: SURG

## 2017-03-21 RX ORDER — LOSARTAN POTASSIUM 50 MG/1
100 TABLET ORAL
Status: DISCONTINUED | OUTPATIENT
Start: 2017-03-21 | End: 2017-03-23

## 2017-03-21 RX ORDER — MIDAZOLAM HYDROCHLORIDE 1 MG/ML
1 INJECTION INTRAMUSCULAR; INTRAVENOUS
Status: DISCONTINUED | OUTPATIENT
Start: 2017-03-21 | End: 2017-03-21 | Stop reason: HOSPADM

## 2017-03-21 RX ORDER — PROMETHAZINE HYDROCHLORIDE 25 MG/1
12.5 TABLET ORAL ONCE AS NEEDED
Status: DISCONTINUED | OUTPATIENT
Start: 2017-03-21 | End: 2017-03-21 | Stop reason: HOSPADM

## 2017-03-21 RX ORDER — FAMOTIDINE 10 MG/ML
20 INJECTION, SOLUTION INTRAVENOUS ONCE
Status: COMPLETED | OUTPATIENT
Start: 2017-03-21 | End: 2017-03-21

## 2017-03-21 RX ORDER — AMLODIPINE BESYLATE 2.5 MG/1
2.5 TABLET ORAL
Status: DISCONTINUED | OUTPATIENT
Start: 2017-03-21 | End: 2017-03-21

## 2017-03-21 RX ORDER — NALOXONE HCL 0.4 MG/ML
0.1 VIAL (ML) INJECTION
Status: DISCONTINUED | OUTPATIENT
Start: 2017-03-21 | End: 2017-03-25 | Stop reason: HOSPADM

## 2017-03-21 RX ORDER — MAGNESIUM HYDROXIDE 1200 MG/15ML
LIQUID ORAL AS NEEDED
Status: DISCONTINUED | OUTPATIENT
Start: 2017-03-21 | End: 2017-03-21 | Stop reason: HOSPADM

## 2017-03-21 RX ORDER — HYDROMORPHONE HCL 110MG/55ML
PATIENT CONTROLLED ANALGESIA SYRINGE INTRAVENOUS AS NEEDED
Status: DISCONTINUED | OUTPATIENT
Start: 2017-03-21 | End: 2017-03-21 | Stop reason: SURG

## 2017-03-21 RX ORDER — DEXTROSE MONOHYDRATE 25 G/50ML
25 INJECTION, SOLUTION INTRAVENOUS
Status: DISCONTINUED | OUTPATIENT
Start: 2017-03-21 | End: 2017-03-25 | Stop reason: HOSPADM

## 2017-03-21 RX ORDER — HYDROMORPHONE HYDROCHLORIDE 1 MG/ML
0.5 INJECTION, SOLUTION INTRAMUSCULAR; INTRAVENOUS; SUBCUTANEOUS
Status: DISCONTINUED | OUTPATIENT
Start: 2017-03-21 | End: 2017-03-21 | Stop reason: HOSPADM

## 2017-03-21 RX ORDER — OXYCODONE AND ACETAMINOPHEN 7.5; 325 MG/1; MG/1
1 TABLET ORAL ONCE AS NEEDED
Status: DISCONTINUED | OUTPATIENT
Start: 2017-03-21 | End: 2017-03-21 | Stop reason: HOSPADM

## 2017-03-21 RX ORDER — UREA 10 %
1 LOTION (ML) TOPICAL NIGHTLY PRN
Status: DISCONTINUED | OUTPATIENT
Start: 2017-03-21 | End: 2017-03-25 | Stop reason: HOSPADM

## 2017-03-21 RX ORDER — ATORVASTATIN CALCIUM 20 MG/1
20 TABLET, FILM COATED ORAL DAILY
Status: DISCONTINUED | OUTPATIENT
Start: 2017-03-21 | End: 2017-03-25 | Stop reason: HOSPADM

## 2017-03-21 RX ORDER — TRIAMTERENE AND HYDROCHLOROTHIAZIDE 37.5; 25 MG/1; MG/1
1 TABLET ORAL
Status: DISCONTINUED | OUTPATIENT
Start: 2017-03-21 | End: 2017-03-21

## 2017-03-21 RX ORDER — CEFAZOLIN SODIUM 2 G/100ML
2 INJECTION, SOLUTION INTRAVENOUS ONCE
Status: COMPLETED | OUTPATIENT
Start: 2017-03-21 | End: 2017-03-21

## 2017-03-21 RX ORDER — ACETAMINOPHEN 325 MG/1
650 TABLET ORAL EVERY 4 HOURS PRN
Status: DISCONTINUED | OUTPATIENT
Start: 2017-03-21 | End: 2017-03-25 | Stop reason: HOSPADM

## 2017-03-21 RX ORDER — FENTANYL CITRATE 50 UG/ML
INJECTION, SOLUTION INTRAMUSCULAR; INTRAVENOUS AS NEEDED
Status: DISCONTINUED | OUTPATIENT
Start: 2017-03-21 | End: 2017-03-21 | Stop reason: SURG

## 2017-03-21 RX ORDER — GLYCOPYRROLATE 0.2 MG/ML
INJECTION INTRAMUSCULAR; INTRAVENOUS AS NEEDED
Status: DISCONTINUED | OUTPATIENT
Start: 2017-03-21 | End: 2017-03-21

## 2017-03-21 RX ORDER — ROPIVACAINE HYDROCHLORIDE 5 MG/ML
INJECTION, SOLUTION EPIDURAL; INFILTRATION; PERINEURAL AS NEEDED
Status: DISCONTINUED | OUTPATIENT
Start: 2017-03-21 | End: 2017-03-21 | Stop reason: HOSPADM

## 2017-03-21 RX ORDER — BISACODYL 10 MG
10 SUPPOSITORY, RECTAL RECTAL DAILY PRN
Status: DISCONTINUED | OUTPATIENT
Start: 2017-03-21 | End: 2017-03-25 | Stop reason: HOSPADM

## 2017-03-21 RX ORDER — OXYCODONE AND ACETAMINOPHEN 7.5; 325 MG/1; MG/1
1 TABLET ORAL EVERY 4 HOURS PRN
Status: DISCONTINUED | OUTPATIENT
Start: 2017-03-21 | End: 2017-03-24

## 2017-03-21 RX ORDER — SENNA AND DOCUSATE SODIUM 50; 8.6 MG/1; MG/1
2 TABLET, FILM COATED ORAL 2 TIMES DAILY
Status: DISCONTINUED | OUTPATIENT
Start: 2017-03-21 | End: 2017-03-25 | Stop reason: HOSPADM

## 2017-03-21 RX ORDER — LABETALOL HYDROCHLORIDE 5 MG/ML
5 INJECTION, SOLUTION INTRAVENOUS
Status: DISCONTINUED | OUTPATIENT
Start: 2017-03-21 | End: 2017-03-21 | Stop reason: HOSPADM

## 2017-03-21 RX ORDER — HYDRALAZINE HYDROCHLORIDE 20 MG/ML
5 INJECTION INTRAMUSCULAR; INTRAVENOUS
Status: DISCONTINUED | OUTPATIENT
Start: 2017-03-21 | End: 2017-03-21 | Stop reason: HOSPADM

## 2017-03-21 RX ORDER — ONDANSETRON 2 MG/ML
INJECTION INTRAMUSCULAR; INTRAVENOUS AS NEEDED
Status: DISCONTINUED | OUTPATIENT
Start: 2017-03-21 | End: 2017-03-21 | Stop reason: SURG

## 2017-03-21 RX ORDER — NICOTINE POLACRILEX 4 MG
15 LOZENGE BUCCAL
Status: DISCONTINUED | OUTPATIENT
Start: 2017-03-21 | End: 2017-03-25 | Stop reason: HOSPADM

## 2017-03-21 RX ORDER — WARFARIN SODIUM 5 MG/1
5 TABLET ORAL
Status: COMPLETED | OUTPATIENT
Start: 2017-03-21 | End: 2017-03-21

## 2017-03-21 RX ORDER — SODIUM CHLORIDE 0.9 % (FLUSH) 0.9 %
1-10 SYRINGE (ML) INJECTION AS NEEDED
Status: DISCONTINUED | OUTPATIENT
Start: 2017-03-21 | End: 2017-03-21 | Stop reason: HOSPADM

## 2017-03-21 RX ORDER — DIPHENHYDRAMINE HCL 25 MG
25 CAPSULE ORAL EVERY 6 HOURS PRN
Status: DISCONTINUED | OUTPATIENT
Start: 2017-03-21 | End: 2017-03-25 | Stop reason: HOSPADM

## 2017-03-21 RX ORDER — LIDOCAINE HYDROCHLORIDE 20 MG/ML
INJECTION, SOLUTION INFILTRATION; PERINEURAL AS NEEDED
Status: DISCONTINUED | OUTPATIENT
Start: 2017-03-21 | End: 2017-03-21 | Stop reason: SURG

## 2017-03-21 RX ORDER — ACETAMINOPHEN 325 MG/1
325 TABLET ORAL EVERY 4 HOURS PRN
Status: DISCONTINUED | OUTPATIENT
Start: 2017-03-21 | End: 2017-03-25 | Stop reason: HOSPADM

## 2017-03-21 RX ORDER — WARFARIN SODIUM 4 MG/1
4 TABLET ORAL
Status: DISCONTINUED | OUTPATIENT
Start: 2017-03-22 | End: 2017-03-25

## 2017-03-21 RX ORDER — ONDANSETRON 2 MG/ML
4 INJECTION INTRAMUSCULAR; INTRAVENOUS ONCE AS NEEDED
Status: DISCONTINUED | OUTPATIENT
Start: 2017-03-21 | End: 2017-03-21 | Stop reason: HOSPADM

## 2017-03-21 RX ORDER — OXYCODONE AND ACETAMINOPHEN 7.5; 325 MG/1; MG/1
2 TABLET ORAL EVERY 4 HOURS PRN
Status: DISCONTINUED | OUTPATIENT
Start: 2017-03-21 | End: 2017-03-24

## 2017-03-21 RX ORDER — DEXAMETHASONE SODIUM PHOSPHATE 10 MG/ML
INJECTION INTRAMUSCULAR; INTRAVENOUS AS NEEDED
Status: DISCONTINUED | OUTPATIENT
Start: 2017-03-21 | End: 2017-03-21 | Stop reason: SURG

## 2017-03-21 RX ORDER — NALOXONE HCL 0.4 MG/ML
0.2 VIAL (ML) INJECTION AS NEEDED
Status: DISCONTINUED | OUTPATIENT
Start: 2017-03-21 | End: 2017-03-21 | Stop reason: HOSPADM

## 2017-03-21 RX ORDER — METHYLPREDNISOLONE SODIUM SUCCINATE 125 MG/2ML
INJECTION, POWDER, LYOPHILIZED, FOR SOLUTION INTRAMUSCULAR; INTRAVENOUS AS NEEDED
Status: DISCONTINUED | OUTPATIENT
Start: 2017-03-21 | End: 2017-03-21 | Stop reason: SURG

## 2017-03-21 RX ORDER — SODIUM CHLORIDE, SODIUM LACTATE, POTASSIUM CHLORIDE, CALCIUM CHLORIDE 600; 310; 30; 20 MG/100ML; MG/100ML; MG/100ML; MG/100ML
75 INJECTION, SOLUTION INTRAVENOUS CONTINUOUS
Status: DISCONTINUED | OUTPATIENT
Start: 2017-03-21 | End: 2017-03-22

## 2017-03-21 RX ORDER — FLUMAZENIL 0.1 MG/ML
0.2 INJECTION INTRAVENOUS AS NEEDED
Status: DISCONTINUED | OUTPATIENT
Start: 2017-03-21 | End: 2017-03-21 | Stop reason: HOSPADM

## 2017-03-21 RX ORDER — DIPHENHYDRAMINE HYDROCHLORIDE 50 MG/ML
12.5 INJECTION INTRAMUSCULAR; INTRAVENOUS
Status: DISCONTINUED | OUTPATIENT
Start: 2017-03-21 | End: 2017-03-21 | Stop reason: HOSPADM

## 2017-03-21 RX ADMIN — ROPIVACAINE HYDROCHLORIDE 20 ML: 2 INJECTION, SOLUTION EPIDURAL; INFILTRATION at 11:11

## 2017-03-21 RX ADMIN — OXYCODONE HYDROCHLORIDE AND ACETAMINOPHEN 1 TABLET: 7.5; 325 TABLET ORAL at 16:35

## 2017-03-21 RX ADMIN — METHYLPREDNISOLONE SODIUM SUCCINATE 40 MG: 125 INJECTION, POWDER, FOR SOLUTION INTRAMUSCULAR; INTRAVENOUS at 11:11

## 2017-03-21 RX ADMIN — MEPIVACAINE HYDROCHLORIDE 10 ML: 20 INJECTION, SOLUTION EPIDURAL; INFILTRATION at 11:11

## 2017-03-21 RX ADMIN — WARFARIN SODIUM 5 MG: 5 TABLET ORAL at 18:11

## 2017-03-21 RX ADMIN — MIDAZOLAM HYDROCHLORIDE 1 MG: 1 INJECTION, SOLUTION INTRAMUSCULAR; INTRAVENOUS at 11:15

## 2017-03-21 RX ADMIN — FENTANYL CITRATE 50 MCG: 50 INJECTION INTRAMUSCULAR; INTRAVENOUS at 11:15

## 2017-03-21 RX ADMIN — DOCUSATE SODIUM,SENNOSIDES 2 TABLET: 50; 8.6 TABLET, FILM COATED ORAL at 18:10

## 2017-03-21 RX ADMIN — ATORVASTATIN CALCIUM 20 MG: 20 TABLET, FILM COATED ORAL at 18:10

## 2017-03-21 RX ADMIN — FENTANYL CITRATE 25 MCG: 50 INJECTION INTRAMUSCULAR; INTRAVENOUS at 14:40

## 2017-03-21 RX ADMIN — CEFAZOLIN SODIUM 2 G: 2 INJECTION, SOLUTION INTRAVENOUS at 12:20

## 2017-03-21 RX ADMIN — FENTANYL CITRATE 25 MCG: 50 INJECTION INTRAMUSCULAR; INTRAVENOUS at 12:50

## 2017-03-21 RX ADMIN — ONDANSETRON 4 MG: 2 INJECTION INTRAMUSCULAR; INTRAVENOUS at 21:11

## 2017-03-21 RX ADMIN — SODIUM CHLORIDE, POTASSIUM CHLORIDE, SODIUM LACTATE AND CALCIUM CHLORIDE 9 ML/HR: 600; 310; 30; 20 INJECTION, SOLUTION INTRAVENOUS at 11:24

## 2017-03-21 RX ADMIN — LIDOCAINE HYDROCHLORIDE 50 MG: 20 INJECTION, SOLUTION INFILTRATION; PERINEURAL at 12:31

## 2017-03-21 RX ADMIN — HYDROMORPHONE HYDROCHLORIDE 0.4 MG: 2 INJECTION, SOLUTION INTRAMUSCULAR; INTRAVENOUS; SUBCUTANEOUS at 13:30

## 2017-03-21 RX ADMIN — OXYCODONE HYDROCHLORIDE AND ACETAMINOPHEN 2 TABLET: 7.5; 325 TABLET ORAL at 23:17

## 2017-03-21 RX ADMIN — METFORMIN HYDROCHLORIDE 500 MG: 500 TABLET ORAL at 18:10

## 2017-03-21 RX ADMIN — INSULIN ASPART 6 UNITS: 100 INJECTION, SOLUTION INTRAVENOUS; SUBCUTANEOUS at 21:00

## 2017-03-21 RX ADMIN — FENTANYL CITRATE 25 MCG: 50 INJECTION INTRAMUSCULAR; INTRAVENOUS at 12:55

## 2017-03-21 RX ADMIN — DEXAMETHASONE SODIUM PHOSPHATE 8 MG: 10 INJECTION INTRAMUSCULAR; INTRAVENOUS at 12:55

## 2017-03-21 RX ADMIN — FAMOTIDINE 20 MG: 10 INJECTION, SOLUTION INTRAVENOUS at 11:24

## 2017-03-21 RX ADMIN — CEFAZOLIN SODIUM 2 G: 2 INJECTION, SOLUTION INTRAVENOUS at 20:00

## 2017-03-21 RX ADMIN — SODIUM CHLORIDE, POTASSIUM CHLORIDE, SODIUM LACTATE AND CALCIUM CHLORIDE 75 ML/HR: 600; 310; 30; 20 INJECTION, SOLUTION INTRAVENOUS at 16:38

## 2017-03-21 RX ADMIN — LOSARTAN POTASSIUM 100 MG: 50 TABLET, FILM COATED ORAL at 18:10

## 2017-03-21 RX ADMIN — INSULIN ASPART 4 UNITS: 100 INJECTION, SOLUTION INTRAVENOUS; SUBCUTANEOUS at 16:37

## 2017-03-21 RX ADMIN — SODIUM CHLORIDE, POTASSIUM CHLORIDE, SODIUM LACTATE AND CALCIUM CHLORIDE 9 ML/HR: 600; 310; 30; 20 INJECTION, SOLUTION INTRAVENOUS at 15:00

## 2017-03-21 RX ADMIN — ACETAMINOPHEN 1000 MG: 10 INJECTION, SOLUTION INTRAVENOUS at 12:25

## 2017-03-21 RX ADMIN — FENTANYL CITRATE 50 MCG: 50 INJECTION INTRAMUSCULAR; INTRAVENOUS at 12:46

## 2017-03-21 RX ADMIN — OXYCODONE HYDROCHLORIDE AND ACETAMINOPHEN 1 TABLET: 7.5; 325 TABLET ORAL at 17:23

## 2017-03-21 RX ADMIN — FENTANYL CITRATE 25 MCG: 50 INJECTION INTRAMUSCULAR; INTRAVENOUS at 15:16

## 2017-03-21 RX ADMIN — ONDANSETRON 4 MG: 2 INJECTION INTRAMUSCULAR; INTRAVENOUS at 12:55

## 2017-03-21 RX ADMIN — PROPOFOL 150 MG: 10 INJECTION, EMULSION INTRAVENOUS at 12:31

## 2017-03-21 NOTE — CONSULTS
"    Name: Rebecca Peterson ADMIT: 3/21/2017   : 1931  PCP: Pollo Dahl MD    MRN: [id number] LOS: 0 days   AGE/SEX: 85 y.o. female  ROOM: 99/1         Inpatient Consult to Hospitalist  Consult performed by: LORENA ABREU  Consult ordered by: FLORENTIN NASH  Reason for consult: Blood pressure and DM      Date of Admit: 3/21/2017  Date of Consult: 17    Subjective   History of Present Illness  Patient is a 85 y.o. female that presents to [facility] following elective right TKA.  She has been admitted to a orthopedic floor following surgery and we were asked to see and assist with her medical problems, specifically as it pertains to her blood pressure and diabetes.  At the time of my visit she denies any chest pain, SOA, nausea, vomiting or diarrhea.  She does complain of expected postoperative discomfort.  She has tolerated a diet.  She has been in a normal state of health leading up to surgery.      Past Medical History   Diagnosis Date   • Acid reflux    • Cataract      BILATERAL   • Dependent edema    • Diabetes mellitus    • Hyperlipidemia    • Hypertension    • Osteoarthritis      Past Surgical History   Procedure Laterality Date   • Hysterectomy     • Ankle surgery Right      History reviewed. No pertinent family history.  Social History   Substance Use Topics   • Smoking status: Former Smoker     Packs/day: 0.25     Types: Cigarettes   • Smokeless tobacco: Never Used      Comment: \"QUIT YEARS AGO\"   • Alcohol use No     Prescriptions Prior to Admission   Medication Sig Dispense Refill Last Dose   • amLODIPine (NORVASC) 2.5 MG tablet Take 2.5 mg by mouth Daily With Lunch.   3/20/2017 at 1200   • triamterene-hydrochlorothiazide (MAXZIDE-25) 37.5-25 MG per tablet Take 1 tablet by mouth Daily With Lunch.   3/20/2017 at 1200   • HYDROcodone-acetaminophen (NORCO) 5-325 MG per tablet Take 1 tablet by mouth Every 6 (Six) Hours As Needed for moderate pain (4-6). 30 tablet 0 3/19/2017 at 1800 "   • losartan (COZAAR) 100 MG tablet Take 100 mg by mouth Daily With Lunch.   3/19/2017 at 1200   • metFORMIN (GLUCOPHAGE) 500 MG tablet Take 500 mg by mouth Daily Before Supper.   3/20/2017 at 1800   • pantoprazole (PROTONIX) 20 MG EC tablet Take 20 mg by mouth As Needed for heartburn.   3/19/2017 at 1200   • simvastatin (ZOCOR) 40 MG tablet Take 40 mg by mouth Every Night.   3/19/2017 at 1700     Allergies:  Review of patient's allergies indicates no known allergies.    Review of Systems   Constitutional: Negative.    HENT: Negative.    Eyes: Negative.    Respiratory: Negative.    Cardiovascular: Negative.    Gastrointestinal: Negative.    Endocrine: Negative.    Genitourinary: Negative.    Musculoskeletal: Negative.    Skin: Negative.    Neurological: Negative.    Hematological: Negative.    Psychiatric/Behavioral: Negative.        Objective      Vital Signs  Temp:  [97.4 °F (36.3 °C)-98.2 °F (36.8 °C)] 97.4 °F (36.3 °C)  Heart Rate:  [62-77] 77  Resp:  [13-21] 18  BP: (111-157)/(60-87) 133/68  There is no height or weight on file to calculate BMI.    Physical Exam   Constitutional: She is oriented to person, place, and time. She appears well-developed and well-nourished. No distress.   HENT:   Head: Normocephalic and atraumatic.   Eyes: Conjunctivae and EOM are normal. No scleral icterus.   Neck: Normal range of motion. Neck supple. No tracheal deviation present.   Cardiovascular: Normal rate and regular rhythm.    Murmur (3/6) heard.  Pulmonary/Chest: Effort normal and breath sounds normal. No respiratory distress. She has no wheezes. She has no rales.   Few rhonchi   Abdominal: Soft. Bowel sounds are normal. She exhibits no distension and no mass. There is no tenderness.   Musculoskeletal: She exhibits edema. She exhibits no deformity.   RLE bandaged not further examined.  +SCDs   Neurological: She is alert and oriented to person, place, and time. No cranial nerve deficit.   Skin: Skin is warm and dry. No rash  noted. No erythema.   Psychiatric: She has a normal mood and affect. Her behavior is normal. Judgment and thought content normal.   Nursing note and vitals reviewed.      Results Review:    I reviewed the patient's new clinical results.      Assessment/Plan     Principal Problem:    Primary osteoarthritis of right knee  Active Problems:    Hypertension    Type 2 diabetes mellitus    Cardiac murmur    Dependent edema      Assessment & Plan  - Continue patient's home oral hypoglycemic medications for diabetes.  - NOVOLOG - moderate dose correctional factor as needed.  - Monitor glucose QAC and QHS. For any BG less than 70 mg/dL, treat per hospital protocol  - HgbA1C and fasting BMP ordered for in the morning.  Will review.    - NCS diet  - Holding parameters have been written for COZAAR.   - Will hold MAXZIDE and NORVASC.  Anticipate fluctuations in BP due to blood loss, hypovolemia, anesthesia, narcotics etc..   - Continue IVFs as ordered.    - Monitor renal status closely.    - SCDs and WARFARIN have been ordered for DVT prophylaxis per ortho.  Monitor INR.  - Patient encouraged to use incentive spirometer as instructed.      Thank you very much for asking [facility] to be involved in this patient's care.  We will follow along with you.      Milind Martinez MD  [facility]  03/21/17  4:59 PM

## 2017-03-21 NOTE — ANESTHESIA PREPROCEDURE EVALUATION
Anesthesia Evaluation        Airway   Mallampati: II  no difficulty expected  Dental    (+) upper dentures and lower dentures    Pulmonary    (+) a smoker Former, asthma,   Cardiovascular     ECG reviewed  Rhythm: regular  Rate: normal        Neuro/Psych  GI/Hepatic/Renal/Endo    (+)  diabetes mellitus type 2,     Musculoskeletal     Abdominal    Substance History      OB/GYN          Other                                    Anesthesia Plan    ASA 3     general   (Right ACB PSR for POPC)  intravenous induction   Anesthetic plan and risks discussed with patient.

## 2017-03-21 NOTE — ANESTHESIA PROCEDURE NOTES
Peripheral Block    Patient location during procedure: pre-op  Start time: 3/21/2017 11:11 AM  Stop time: 3/21/2017 11:20 AM  Reason for block: at surgeon's request and post-op pain management  Performed by  Anesthesiologist: KVNG HORNE  Preanesthetic Checklist  Completed: patient identified, site marked, surgical consent, pre-op evaluation, timeout performed, IV checked, risks and benefits discussed and monitors and equipment checked  Peripheral Block Prep:  Sterile barriers:cap, gloves, gown, mask and sterile barriers  Prep: ChloraPrep  Patient monitoring: blood pressure monitoring, continuous pulse oximetry and EKG  Peripheral Procedure  Sedation:yes  Guidance:ultrasound guided  Images:still images obtained    Laterality:right  Block Type:adductor canal block  Injection Technique:single-shot  Needle Type:echogenic  Needle Gauge:22 G  ULTRASOUND INTERPRETATION.  Using ultrasound guidance a 21 G gauge needle was placed in close proximity to the femoral nerve, at which point, under ultrasound guidance anesthetic was injected in the area of the nerve and spread of the anesthesia was seen on ultrasound in close proximity thereto.  There were no abnormalities seen on ultrasound; a digital image was taken; and the patient tolerated the procedure with no complications.   Medications  Depomedrol:40 mg  Local Injected:ropivacaine 0.2% and 2% Mepivacaine  Post Assessment  Injection Assessment: negative aspiration for heme, no paresthesia on injection and incremental injection  Patient Tolerance:comfortable throughout block  Complications:no

## 2017-03-21 NOTE — H&P
History & Physical       Patient: Rebecca Peterson    Date of Admission: 3/21/2017 10:14 AM    YOB: 1931    Medical Record Number: [id number]    Attending Physician: Eleazar Head MD        Chief Complaints: Primary osteoarthritis of right knee [M17.11]  Primary osteoarthritis of right knee [M17.11]      History of Present Illness: 85 y.o. female presents with Primary osteoarthritis of right knee [M17.11]  Primary osteoarthritis of right knee [M17.11]. Onset of symptoms was gradual and her pain was progressively worse.  Symptoms are associated with pain on the lateral aspect of the knee and a progressive valgus deformity.  Symptoms are aggravated by going up and down the steps and deeply flexing the knee as in squatting.   Symptoms improve with rest and use of a brace. Patient is now being admitted to the services of Eleazar Head MD for further evaluation and treatment.      No Known Allergies      Home Medications:  Prescriptions Prior to Admission   Medication Sig Dispense Refill Last Dose   • amLODIPine (NORVASC) 2.5 MG tablet Take 2.5 mg by mouth Daily With Lunch.      • HYDROcodone-acetaminophen (NORCO) 5-325 MG per tablet Take 1 tablet by mouth Every 6 (Six) Hours As Needed for moderate pain (4-6). 30 tablet 0    • losartan (COZAAR) 100 MG tablet Take 100 mg by mouth Daily With Lunch.      • metFORMIN (GLUCOPHAGE) 500 MG tablet Take 500 mg by mouth Daily Before Supper.      • pantoprazole (PROTONIX) 20 MG EC tablet Take 20 mg by mouth As Needed for heartburn.      • simvastatin (ZOCOR) 40 MG tablet Take 40 mg by mouth Every Night.      • triamterene-hydrochlorothiazide (MAXZIDE-25) 37.5-25 MG per tablet Take 1 tablet by mouth Daily With Lunch.          Current Medications:  Scheduled Meds:  ceFAZolin 2 g Intravenous Once     Continuous Infusions:   PRN Meds:.       Past Medical History   Diagnosis Date   • Acid reflux    • Cataract      BILATERAL   • Dependent edema    • Diabetes mellitus    •  "Hyperlipidemia    • Hypertension    • Osteoarthritis         Past Surgical History   Procedure Laterality Date   • Hysterectomy     • Ankle surgery Right         Social History     Occupational History   • Not on file.     Social History Main Topics   • Smoking status: Former Smoker     Packs/day: 0.25     Types: Cigarettes   • Smokeless tobacco: Never Used      Comment: \"QUIT YEARS AGO\"   • Alcohol use No   • Drug use: No   • Sexual activity: Defer    Social History     Social History Narrative      No family history on file.      Review of Systems:   HEENT: Patient denies any headaches, vision changes, change in hearing, or tinnitus, Patient denies any rhinorrhea,epistaxis, sinus pain, mouth or dental problems, sore throat or hoarseness, or dysphagia  Pulmonary: Patient denies any cough, congestion, SOA, or wheezing  Cardiovascular: Patient denies any chest pain, dyspnea, palpitations, weakness, intolerance of exercise, varicosities, swelling of extremities, known murmur  Gastrointestinal:  Patient denies nausea, vomiting, diarrhea, constipation, loss  of appetite, change in appetite, dysphagia, gas, heartburn, melena, change in bowel habits, use of laxatives or other drugs to alter the function of the gastrointestinal tract.  Genital/Urinary: Patient denies dysuria, change in color of urine, change in frequency of urination, pain with urgency, incontinence, retention, or nocturia.  Musculoskeletal: Patient denies increased warmth; redness; or swelling of joints; limitation of function; deformity; crepitation: pain in a joint or an extremity, the neck, or the back, especially with movement.  Neurological: Patient denies dizziness, tremor, ataxia, difficulty in speaking, change in speech, paresthesia, loss of sensation, seizures, syncope, changes in memory.  Endocrine system: Patient denies tremors, palpitations, intolerance of heat or cold, polyuria, polydipsia, polyphagia, diaphoresis, exophthalmos, or " goiter.  Psychological: Patient denies thoughts/plans or harming self or other; depression,  insomnia, night terrors, nuria, memory loss, disorientation.  Skin: Patient denies any bruising, rashes, discoloration, pruritus, wounds, ulcers, decubiti, changes in the hair or nails  Hematopoietic: Patient denies history of spontaneous or excessive bleeding, epistaxis, hematuria, melena, fatigue, enlarged or tender lymph nodes, pallor, history of anemia.    Physical Exam: 85 y.o. female  Vitals:    03/21/17 1045   BP: 153/77   BP Location: Left arm   Patient Position: Lying   Pulse: 70   Resp: 21   Temp: 98.2 °F (36.8 °C)   TempSrc: Oral   SpO2: 99%       General Appearance:          Alert, cooperative, in no acute distress                                                 Head:    Normocephalic, without obvious abnormality, atraumatic   Eyes:            Lids and lashes normal, conjunctivae and sclerae normal, no   icterus, no pallor, corneas clear, PERRLA   Ears:    Ears appear intact with no abnormalities noted   Throat:   No oral lesions, no thrush, oral mucosa moist   Neck:   No adenopathy, supple, trachea midline, no thyromegaly, no   carotid bruit, no JVD   Back:     No kyphosis present, no scoliosis present, no skin lesions,      erythema or scars, no tenderness to percussion or                   palpation,   range of motion normal   Lungs:     Clear to auscultation,respirations regular, even and                  unlabored    Heart:    Regular rhythm and normal rate, normal S1 and S2, no            murmur, no gallop, no rub, no click   Chest Wall:    No abnormalities observed   Abdomen:     Normal bowel sounds, no masses, no organomegaly, soft        non-tender, non-distended, no guarding, no rebound                tenderness   Rectal:     Deferred   Extremities:   Tenderness over lateral joint line of the right knee  . Moves all extremities well, no edema,   no cyanosis, no redness   Pulses:   Pulses palpable and  equal bilaterally   Skin:   No bleeding, bruising or rash   Lymph nodes:   No palpable adenopathy   Neurologic:   Cranial nerves 2 - 12 grossly intact, sensation intact, DTR       present and equal bilaterally   right knee. Positive grind test. Pain and tenderness is present over the lateral aspect of the knee. Patient has some tenderness and irritability of the common peroneal nerve. Lateral joint line is exquisitely painful and tender. Patella is tending to track a little bit laterally. There is thickening of the synovial membrane. Range of motion in flexion is 0-110° degrees. Dorsalis pedis and posterior tibial artery pulses are palpable. Common peroneal nerve function is well preserved. Gait is cautious and antalgic. The patient has valgus orientation of the knee with a high degree of external rotation than the contralateral side.        Diagnostic Tests:  No visits with results within 2 Day(s) from this visit.  Latest known visit with results is:    Appointment on 03/14/2017   Component Date Value Ref Range Status   • Glucose 03/14/2017 213* 65 - 99 mg/dL Final   • BUN 03/14/2017 17  8 - 23 mg/dL Final   • Creatinine 03/14/2017 0.72  0.57 - 1.00 mg/dL Final   • Sodium 03/14/2017 141  136 - 145 mmol/L Final   • Potassium 03/14/2017 4.1  3.5 - 5.2 mmol/L Final   • Chloride 03/14/2017 103  98 - 107 mmol/L Final   • CO2 03/14/2017 23.7  22.0 - 29.0 mmol/L Final   • Calcium 03/14/2017 10.1  8.6 - 10.5 mg/dL Final   • eGFR   Amer 03/14/2017 93  >60 mL/min/1.73 Final   • BUN/Creatinine Ratio 03/14/2017 23.6  7.0 - 25.0 Final   • Anion Gap 03/14/2017 14.3  mmol/L Final   • WBC 03/14/2017 8.32  4.50 - 10.70 10*3/mm3 Final   • RBC 03/14/2017 4.03  3.90 - 5.20 10*6/mm3 Final   • Hemoglobin 03/14/2017 10.7* 11.9 - 15.5 g/dL Final   • Hematocrit 03/14/2017 34.1* 35.6 - 45.5 % Final   • MCV 03/14/2017 84.6  80.5 - 98.2 fL Final   • MCH 03/14/2017 26.6* 26.9 - 32.0 pg Final   • MCHC 03/14/2017 31.4* 32.4 - 36.3 g/dL  Final   • RDW 03/14/2017 15.0* 11.7 - 13.0 % Final   • RDW-SD 03/14/2017 46.5  37.0 - 54.0 fl Final   • MPV 03/14/2017 8.4  6.0 - 12.0 fL Final   • Platelets 03/14/2017 311  140 - 500 10*3/mm3 Final   • Color, UA 03/14/2017 Yellow  Yellow, Straw Final   • Appearance, UA 03/14/2017 Clear  Clear Final   • pH, UA 03/14/2017 6.0  5.0 - 8.0 Final   • Specific Gravity, UA 03/14/2017 1.018  1.005 - 1.030 Final   • Glucose, UA 03/14/2017 Negative  Negative Final   • Ketones, UA 03/14/2017 Negative  Negative Final   • Bilirubin, UA 03/14/2017 Negative  Negative Final   • Blood, UA 03/14/2017 Negative  Negative Final   • Protein, UA 03/14/2017 Negative  Negative Final   • Leuk Esterase, UA 03/14/2017 Negative  Negative Final   • Nitrite, UA 03/14/2017 Negative  Negative Final   • Urobilinogen, UA 03/14/2017 1.0 E.U./dL  0.2 - 1.0 E.U./dL Final     No results found.      Assessment:  Patient Active Problem List   Diagnosis   • Primary osteoarthritis of right knee         Plan:  The patient voiced understanding of the risks, benefits, and alternative forms of treatment that were discussed and the patient consents to proceed with right total knee replacement.     The patient was seen in the office today for preoperative discussion.  The patient has been tried on over-the-counter and prescription NSAID's despite the risks of anti-inflammatory bleeding, peptic ulcers and erosive gastritis with short term benefit only.  Braces have been prescribed for mechanical support.  Patient has been participating in an exercise program specifically targeting joint pain relief with limited benefit. Intraarticular injections have been used periodically with some but not complete relief of pain.  Ambulation aids have also been utilized.      The details of the surgical procedure were explained including the location of probable incisions and a description of the likely hardware/grafts to be used. The patient understands the likely convalescence  after surgery as well as the rehabilitation required.  Also, we have thoroughly discussed with the patient the risks, benefits and alternatives to surgery.  Risks include but are not limited to the risk of infection, joint stiffness, limited range of motion, wound healing problems, scar tissue build up, myocardial infarction, stroke, blood clots (including DVT and/or pulmonary embolus along with the risk of death) neurologic and/or vascular injury, limb length discrepancy, fracture, dislocation, nonunion, malunion, continued pain and need for further surgery including hardware failure requiring revision.   Discharge Plan: 3-4 days to home and home health      Date: 3/21/2017    Eleazar Head MD      EMR Dragon/Transcription disclaimer:   Much of this encounter note is an electronic transcription/translation of spoken language to printed text. The electronic translation of spoken language may per ronaldo erroneous or at times nonsensical words or phrases to be inadvertently transcribed. Although I have reviewed this note for such errors, some may still exist.

## 2017-03-21 NOTE — ANESTHESIA POSTPROCEDURE EVALUATION
Patient: Rebecca Peterson    Procedure Summary     Date Anesthesia Start Anesthesia Stop Room / Location    03/21/17 1220 1426  [facility] /  [facility]       Procedure Diagnosis Surgeon Provider    TOTAL KNEE ARTHROPLASTY  (Right Knee) Primary osteoarthritis of right knee  (Primary osteoarthritis of right knee [M17.11]) MD Cora Andres MD          Anesthesia Type: general  Last vitals  /77 (03/21/17 1445)    Temp 36.4 °C (97.5 °F) (03/21/17 1420)    Pulse 70 (03/21/17 1445)   Resp 16 (03/21/17 1445)    SpO2 100 % (03/21/17 1445)      Post Anesthesia Care and Evaluation    Patient location during evaluation: PACU  Patient participation: complete - patient participated  Level of consciousness: awake and alert  Pain score: 0  Pain management: adequate  Airway patency: patent  Anesthetic complications: No anesthetic complications    Cardiovascular status: acceptable  Respiratory status: acceptable  Hydration status: acceptable

## 2017-03-21 NOTE — CONSULTS
[facility]  Cardiology Consult Note    Patient Identification:  Name: Rebecca Peterson  Age: 85 y.o.  Sex: female  :  1931  MRN: [id number]             Requesting Physician: \    Reason for Consultation / Chief Complaint: management recommendations  cardiac postop management    History of Present Illness:   Patient is a 85 y.o. female that presents to [facility] following elective right TKA. She has been admitted to a orthopedic floor following surgery and we were asked to see and assist with her cardiac problems, At the time of my visit she denies any chest pain, SOA, nausea, vomiting or diarrhea. She does complain of expected postoperative discomfort. She has tolerated a diet. She has been in a normal state of health leading up to surgery.    Comorbid cardiac risk factors:     Past Medical History:  Past Medical History   Diagnosis Date   • Acid reflux    • Cataract      BILATERAL   • Dependent edema    • Diabetes mellitus    • Hyperlipidemia    • Hypertension    • Osteoarthritis      Past Surgical History:  Past Surgical History   Procedure Laterality Date   • Hysterectomy     • Ankle surgery Right       Allergies:  No Known Allergies  Home Meds:  Prescriptions Prior to Admission   Medication Sig Dispense Refill Last Dose   • amLODIPine (NORVASC) 2.5 MG tablet Take 2.5 mg by mouth Daily With Lunch.   3/20/2017 at 1200   • triamterene-hydrochlorothiazide (MAXZIDE-25) 37.5-25 MG per tablet Take 1 tablet by mouth Daily With Lunch.   3/20/2017 at 1200   • HYDROcodone-acetaminophen (NORCO) 5-325 MG per tablet Take 1 tablet by mouth Every 6 (Six) Hours As Needed for moderate pain (4-6). 30 tablet 0 3/19/2017 at 1800   • losartan (COZAAR) 100 MG tablet Take 100 mg by mouth Daily With Lunch.   3/19/2017 at 1200   • metFORMIN (GLUCOPHAGE) 500 MG tablet Take 500 mg by mouth Daily Before Supper.   3/20/2017 at 1800   • pantoprazole (PROTONIX) 20 MG EC tablet Take 20 mg by mouth As  "Needed for heartburn.   3/19/2017 at 1200   • simvastatin (ZOCOR) 40 MG tablet Take 40 mg by mouth Every Night.   3/19/2017 at 1700     Current Meds:   [unfilled]  Social History:   Social History   Substance Use Topics   • Smoking status: Former Smoker     Packs/day: 0.25     Types: Cigarettes   • Smokeless tobacco: Never Used      Comment: \"QUIT YEARS AGO\"   • Alcohol use No      Family History:  History reviewed. No pertinent family history.     Review of Systems    Constitutional: No weakness,fatigue, fever, rigors, chills   Eyes: No vision changes, eye pain   ENT/oropharynx: No difficulty swallowing, sore throat, epistaxis, changes in hearing   Cardiovascular: No chest pain, chest tightness, palpitations, paroxysmal nocturnal dyspnea, orthopnea, diaphoresis, dizziness / syncopal episode   Respiratory: No shortness of breath, dyspnea on exertion, cough, wheezing hemoptysis   Gastrointestinal: No abdominal pain, nausea, vomiting, diarrhea, bloody stools   Genitourinary: No hematuria, dysuria   Neurological: No headache, tremors, numbness,  one-sided weakness    Musculoskeletal: No cramps, myalgias,  joint pain, joint swelling   Integument: No rash, edema           Constitutional:  Temp:  [97.5 °F (36.4 °C)-98.2 °F (36.8 °C)] 97.9 °F (36.6 °C)  Heart Rate:  [62-77] 74  Resp:  [13-21] 16  BP: (111-157)/(60-87) 147/74    Physical Exam by Milena Rendon MD  General:  Appears in no acute distress  Eyes: PERTL,  HEENT:  No JVD. Thyroid not visibly enlarged. No mucosal pallor or cyanosis  Respiratory: Respirations regular and unlabored at rest. Equal chest expansion. BBS with good air entry in all fields. No crackles, rubs or wheezes auscultated  Cardiovascular: S1S2 Regular rate and rhythm. No murmur, rub or gallop auscultated. No carotid bruits. DP/PT pulses    . No pretibial pitting edema  Gastrointestinal: Abdomen soft, flat, non tender. Bowel sounds present. No hepatosplenomegaly. No ascites  Musculoskeletal: " GAMA x4. No abnormal movements  Extremities: No digital clubbing or cyanosis  Skin: Color pink. Skin warm and dry to touch. No rashes  No xanthoma  Neuro: AAO x3 CN II-XII grossly intact  Physical Exam by Dr Rendon:          Cardiographics  ECG:     Telemetry:    Echocardiogram:     Imaging  Chest X-ray:     Lab Review               @[email or web address]@              Assessment:  Principal Problem:  Primary osteoarthritis of right knee  Active Problems:  Hypertension  Type 2 diabetes mellitus  Cardiac murmur  Dependent edema    Recommendations / Plan:   Resume cardiac medications  Anticoagulation  EKG in the morning        I reviewed the patient's clinical results, medications and treatment plan. I personally viewed and interpreted the patient's EKG/Telemetry data    Milena Rendon MD  3/21/2017, 4:06 PM      EMR Dragon/Transcription disclaimer:   Much of this encounter note is an electronic transcription/translation of spoken language to printed text. The electronic translation of spoken language may permit erroneous, or at times, nonsensical words or phrases to be inadvertently transcribed; Although I have reviewed the note for such errors, some may still exist.

## 2017-03-21 NOTE — PLAN OF CARE
Problem: Perioperative Period (Adult)  Goal: Signs and Symptoms of Listed Potential Problems Will be Absent or Manageable (Perioperative Period)  Outcome: Ongoing (interventions implemented as appropriate)    03/21/17 1047   Perioperative Period   Problems Assessed (Perioperative Period) infection   Problems Present (Perioperative Period) infection

## 2017-03-21 NOTE — PLAN OF CARE
Problem: Patient Care Overview (Adult)  Goal: Plan of Care Review  Outcome: Ongoing (interventions implemented as appropriate)    03/21/17 1801   Coping/Psychosocial Response Interventions   Plan Of Care Reviewed With patient   Patient Care Overview   Progress improving   Outcome Evaluation   Outcome Summary/Follow up Plan VSS since arrival from PACU at 1545. lots of family present at bedside. PT will evaluate patient tomorrow am. ace wrap had moist drainage present at arrival from PACU, drainage marked and dressing reinforced with two abd pads and an ace wrap. PO pain medication started. DTV @ 2220.         Problem: Perioperative Period (Adult)  Goal: Signs and Symptoms of Listed Potential Problems Will be Absent or Manageable (Perioperative Period)  Outcome: Ongoing (interventions implemented as appropriate)    Problem: Fall Risk (Adult)  Goal: Identify Related Risk Factors and Signs and Symptoms  Outcome: Outcome(s) achieved Date Met:  03/21/17  Goal: Absence of Falls  Outcome: Ongoing (interventions implemented as appropriate)    Problem: Knee Replacement, Total (Adult)  Goal: Signs and Symptoms of Listed Potential Problems Will be Absent or Manageable (Knee Replacement, Total)  Outcome: Ongoing (interventions implemented as appropriate)

## 2017-03-22 LAB
GLUCOSE BLDC GLUCOMTR-MCNC: 158 MG/DL (ref 70–130)
GLUCOSE BLDC GLUCOMTR-MCNC: 165 MG/DL (ref 70–130)
GLUCOSE BLDC GLUCOMTR-MCNC: 183 MG/DL (ref 70–130)
GLUCOSE BLDC GLUCOMTR-MCNC: 196 MG/DL (ref 70–130)
HBA1C MFR BLD: 6.36 % (ref 4.8–5.6)
HCT VFR BLD AUTO: 27.7 % (ref 35.6–45.5)
HGB BLD-MCNC: 8.8 G/DL (ref 11.9–15.5)
INR PPP: 1.17 (ref 0.9–1.1)
PROTHROMBIN TIME: 14.5 SECONDS (ref 11.7–14.2)

## 2017-03-22 PROCEDURE — 97110 THERAPEUTIC EXERCISES: CPT

## 2017-03-22 PROCEDURE — 63710000001 INSULIN ASPART PER 5 UNITS: Performed by: HOSPITALIST

## 2017-03-22 PROCEDURE — 25010000003 CEFAZOLIN IN DEXTROSE 2-4 GM/100ML-% SOLUTION: Performed by: ORTHOPAEDIC SURGERY

## 2017-03-22 PROCEDURE — 97150 GROUP THERAPEUTIC PROCEDURES: CPT

## 2017-03-22 PROCEDURE — 99024 POSTOP FOLLOW-UP VISIT: CPT | Performed by: ORTHOPAEDIC SURGERY

## 2017-03-22 PROCEDURE — 85014 HEMATOCRIT: CPT | Performed by: ORTHOPAEDIC SURGERY

## 2017-03-22 PROCEDURE — 94799 UNLISTED PULMONARY SVC/PX: CPT

## 2017-03-22 PROCEDURE — 85610 PROTHROMBIN TIME: CPT | Performed by: ORTHOPAEDIC SURGERY

## 2017-03-22 PROCEDURE — 82962 GLUCOSE BLOOD TEST: CPT

## 2017-03-22 PROCEDURE — 85018 HEMOGLOBIN: CPT | Performed by: ORTHOPAEDIC SURGERY

## 2017-03-22 PROCEDURE — 83036 HEMOGLOBIN GLYCOSYLATED A1C: CPT | Performed by: HOSPITALIST

## 2017-03-22 PROCEDURE — 97161 PT EVAL LOW COMPLEX 20 MIN: CPT

## 2017-03-22 RX ADMIN — OXYCODONE HYDROCHLORIDE AND ACETAMINOPHEN 2 TABLET: 7.5; 325 TABLET ORAL at 06:20

## 2017-03-22 RX ADMIN — OXYCODONE HYDROCHLORIDE AND ACETAMINOPHEN 2 TABLET: 7.5; 325 TABLET ORAL at 14:44

## 2017-03-22 RX ADMIN — LOSARTAN POTASSIUM 100 MG: 50 TABLET, FILM COATED ORAL at 12:22

## 2017-03-22 RX ADMIN — INSULIN ASPART 2 UNITS: 100 INJECTION, SOLUTION INTRAVENOUS; SUBCUTANEOUS at 21:36

## 2017-03-22 RX ADMIN — DOCUSATE SODIUM,SENNOSIDES 2 TABLET: 50; 8.6 TABLET, FILM COATED ORAL at 17:01

## 2017-03-22 RX ADMIN — OXYCODONE HYDROCHLORIDE AND ACETAMINOPHEN 2 TABLET: 7.5; 325 TABLET ORAL at 10:30

## 2017-03-22 RX ADMIN — CEFAZOLIN SODIUM 2 G: 2 INJECTION, SOLUTION INTRAVENOUS at 04:00

## 2017-03-22 RX ADMIN — METFORMIN HYDROCHLORIDE 500 MG: 500 TABLET ORAL at 17:01

## 2017-03-22 RX ADMIN — ATORVASTATIN CALCIUM 20 MG: 20 TABLET, FILM COATED ORAL at 08:27

## 2017-03-22 RX ADMIN — INSULIN ASPART 2 UNITS: 100 INJECTION, SOLUTION INTRAVENOUS; SUBCUTANEOUS at 12:22

## 2017-03-22 RX ADMIN — DOCUSATE SODIUM,SENNOSIDES 2 TABLET: 50; 8.6 TABLET, FILM COATED ORAL at 08:27

## 2017-03-22 RX ADMIN — INSULIN ASPART 2 UNITS: 100 INJECTION, SOLUTION INTRAVENOUS; SUBCUTANEOUS at 08:27

## 2017-03-22 RX ADMIN — MUPIROCIN: 20 OINTMENT TOPICAL at 17:01

## 2017-03-22 RX ADMIN — INSULIN ASPART 2 UNITS: 100 INJECTION, SOLUTION INTRAVENOUS; SUBCUTANEOUS at 17:01

## 2017-03-22 RX ADMIN — MUPIROCIN: 20 OINTMENT TOPICAL at 08:27

## 2017-03-22 RX ADMIN — OXYCODONE HYDROCHLORIDE AND ACETAMINOPHEN 2 TABLET: 7.5; 325 TABLET ORAL at 19:23

## 2017-03-22 RX ADMIN — WARFARIN SODIUM 4 MG: 4 TABLET ORAL at 17:01

## 2017-03-22 NOTE — PROGRESS NOTES
Discharge Planning Assessment  [facility]     Patient Name: Rebecca Peterson  MRN: [id number]  Today's Date: 3/22/2017    Admit Date: 3/21/2017          Discharge Needs Assessment       03/22/17 1606    Living Environment    Lives With child(abner), adult    Living Arrangements house    Discharge Needs Assessment    Concerns To Be Addressed basic needs concerns    Readmission Within The Last 30 Days no previous admission in last 30 days    Anticipated Changes Related to Illness inability to care for self    Equipment Currently Used at Home walker, rolling    Discharge Facility/Level Of Care Needs nursing facility, skilled    Transportation Available family or friend will provide;ambulance;car            Discharge Plan       03/22/17 1606    Case Management/Social Work Plan    Plan [facility] skilled    Patient/Family In Agreement With Plan yes    Additional Comments Spoke with pt and family, pt would like to d/c to [facility] skilled, referral given to Fred with [facility] who states pt approved, pending bed availability. Plan will be to d/c to [facility], CCP to follow.        Discharge Placement     Facility/Agency Request Status Selected? Address Phone Number Fax Number    [facility] Pending - bed availability     [address] [facility] [address] [phone]      Janiya Williamson RN

## 2017-03-22 NOTE — PLAN OF CARE
Problem: Patient Care Overview (Adult)  Goal: Plan of Care Review    03/22/17 1001   Coping/Psychosocial Response Interventions   Plan Of Care Reviewed With patient;daughter   Outcome Evaluation   Outcome Summary/Follow up Plan Pt demonstrated ability to perform exercises with minimal c/o pain. Pt ambulated 12 feet and transferred from chair to bed with min Ax2 and rolling walker. Pt would benefit from inpt. physical therapy in order to improve strength, ROM, ambulation, and functional transfers.         Problem: Inpatient Physical Therapy  Goal: Bed Mobility Goal LTG- PT    03/22/17 1001   Bed Mobility PT LTG   Bed Mobility PT LTG, Date Established 03/22/17   Bed Mobility PT LTG, Time to Achieve 3 days   Bed Mobility PT LTG, Activity Type all bed mobility   Bed Mobility PT LTG, [place] Level conditional independence       Goal: Transfer Training Goal 1 LTG- PT    03/22/17 1001   Transfer Training PT LTG   Transfer Training PT LTG, Date Established 03/22/17   Transfer Training PT LTG, Time to Achieve 3 days   Transfer Training PT LTG, Activity Type all transfers   Transfer Training PT LTG, [place] Level supervision required   Transfer Training PT LTG, Assist Device walker, rolling       Goal: Gait Training Goal LTG- PT    03/22/17 1001   Gait Training PT LTG   Gait Training Goal PT LTG, Date Established 03/22/17   Gait Training Goal PT LTG, Time to Achieve 3 days   Gait Training Goal PT LTG, [place] Level conditional independence   Gait Training Goal PT LTG, Assist Device walker, rolling   Gait Training Goal PT LTG, Distance to Achieve 50 ft       Goal: Range of Motion Goal LTG- PT    03/22/17 1001   Range of Motion PT LTG   Range of Motion Goal PT LTG, Date Established 03/22/17   Range of Motion Goal PT LTG, Time to Achieve 3 days   Range fo Motion Goal PT LTG, Joint R knee   Range of Motion Goal PT LTG, AROM Measure 5 to 90 degrees       Goal: Patient Education Goal LTG- PT    03/22/17 1001    Patient Education PT LTG   Patient Education PT LTG, Date Established 03/22/17   Patient Education PT LTG, Time to Achieve 3 days   Patient Education PT LTG, Education Type HEP   Patient Education PT LTG, Education Understanding verbalize understanding

## 2017-03-22 NOTE — PROGRESS NOTES
"    Name: Rebecca Peterson ADMIT: 3/21/2017   : 1931  PCP: Pollo Dahl MD    MRN: [id number] LOS: 1 days   AGE/SEX: 85 y.o. female  ROOM: [facility]     Subjective   No new complaints.  Did well through the night.    Objective   Vital Signs  Temp:  [97.2 °F (36.2 °C)-98.2 °F (36.8 °C)] 97.9 °F (36.6 °C)  Heart Rate:  [62-80] 76  Resp:  [13-21] 16  BP: (102-157)/(59-87) 102/59  SpO2:  [95 %-100 %] 98 %  on  Flow (L/min):  [2-4] 2;   O2 Device: room air  Body mass index is 32.22 kg/(m^2).    Objective:  General Appearance:  Comfortable and in no acute distress.    Vital signs: (most recent): Blood pressure 102/59, pulse 76, temperature 97.9 °F (36.6 °C), temperature source Oral, resp. rate 16, height 60\" (152.4 cm), weight 165 lb (74.8 kg), SpO2 98 %.    Lungs:  Normal effort.  Breath sounds clear to auscultation.    Heart: Normal rate.  Regular rhythm.    Abdomen: Abdomen is soft and non-distended.  Bowel sounds are normal.   There is no abdominal tenderness.     Extremities: There is dependent edema (chronic).    Neurological: Patient is alert and oriented to person, place and time.    Skin:  Warm and dry.        Results Review:       I reviewed the patient's new clinical results.    Results from last 7 days  Lab Units 17  0458 17  1638   HEMOGLOBIN g/dL 8.8* 10.2*     Estimated Creatinine Clearance: 46.4 mL/min (by C-G formula based on Cr of 0.72).      Lab Results   Component Value Date    HGBA1C 6.36 (H) 2017     Glucose   Date/Time Value Ref Range Status   2017 0615 183 (H) 70 - 130 mg/dL Final   2017 2117 264 (H) 70 - 130 mg/dL Final   2017 1627 209 (H) 70 - 130 mg/dL Final   2017 1054 129 70 - 130 mg/dL Final         atorvastatin 20 mg Oral Daily   insulin aspart 0-9 Units Subcutaneous 4x Daily AC & at Bedtime   losartan 100 mg Oral Daily With Lunch   metFORMIN 500 mg Oral Daily Before Supper   mupirocin  Each Nare BID   sennosides-docusate sodium 2 tablet Oral BID "   warfarin 4 mg Oral Daily       lactated ringers 9 mL/hr Last Rate: 9 mL/hr (03/21/17 1500)   lactated ringers 75 mL/hr Last Rate: 75 mL/hr (03/21/17 1638)         Assessment/Plan   Assessment:     Active Hospital Problems (** Indicates Principal Problem)    Diagnosis Date Noted   • **Primary osteoarthritis of right knee [M17.11] 03/21/2017   • Hypertension [I10] 03/21/2017   • Type 2 diabetes mellitus [E11.9] 03/21/2017   • Cardiac murmur [R01.1] 03/21/2017   • Dependent edema [R60.9] 03/21/2017      Resolved Hospital Problems    Diagnosis Date Noted Date Resolved   No resolved problems to display.       Plan:   - POD#1 TOTAL KNEE ARTHROPLASTY with [place] Navigation  - Continue patient's home oral hypoglycemic medications for diabetes.  - NOVOLOG - moderate dose correctional factor as needed.  - HgbA1C is good.  Continue home diabetic regimen postdischarge.  - Holding parameters have been written for COZAAR.   - Continue to hold hold MAXZIDE and NORVASC.  Patient reports pedal edema is chronic and has not been worsened since started on amlodipine last year.  - Check BMP in a.m.  - SCDs and WARFARIN have been ordered for DVT prophylaxis per ortho. Monitor INR.  - Patient encouraged to use incentive spirometer as instructed.        Milind Martinez MD  [facility]  03/22/17  8:50 AM

## 2017-03-22 NOTE — PROGRESS NOTES
Orthopedic Progress Note      Patient: Rebecca Peterson    YOB: 1931    Medical Record Number: [id number]    Attending Physician: Eleazar Head MD    Date of Admission: 3/21/2017 10:14 AM    Status Post: [place] TOTAL KNEE ARTHROPLASTY [81005] (TOTAL KNEE ARTHROPLASTY with [place] Navigation)    Post Operative Day Number: 1    Admitting Dx: Primary osteoarthritis of right knee [M17.11]  Primary osteoarthritis of right knee [M17.11]    Current Problem List:   Patient Active Problem List   Diagnosis   • Primary osteoarthritis of right knee   • Hypertension   • Type 2 diabetes mellitus   • Cardiac murmur   • Dependent edema         Past Medical History:   Diagnosis Date   • Acid reflux    • Cataract     BILATERAL   • Dependent edema    • Diabetes mellitus    • Hyperlipidemia    • Hypertension    • Osteoarthritis        Current Medications:  Scheduled Meds:  atorvastatin 20 mg Oral Daily   insulin aspart 0-9 Units Subcutaneous 4x Daily AC & at Bedtime   losartan 100 mg Oral Daily With Lunch   metFORMIN 500 mg Oral Daily Before Supper   mupirocin  Each Nare BID   sennosides-docusate sodium 2 tablet Oral BID   warfarin 4 mg Oral Daily     PRN Meds:.•  acetaminophen  •  acetaminophen  •  bisacodyl  •  bisacodyl  •  dextrose  •  dextrose  •  diphenhydrAMINE  •  docusate sodium  •  glucagon (human recombinant)  •  HYDROmorphone **AND** naloxone  •  magnesium hydroxide  •  melatonin  •  ondansetron **OR** ondansetron ODT **OR** ondansetron  •  ondansetron  •  oxyCODONE-acetaminophen  •  oxyCODONE-acetaminophen  •  pantoprazole    Diagnostic Tests:    Lab Results (last 24 hours)     Procedure Component Value Units Date/Time    POC Glucose Fingerstick [[id number]]  (Normal) Collected:  03/21/17 1054    Specimen:  Blood Updated:  03/21/17 1056     Glucose 129 mg/dL     Narrative:       Meter: [id number] : [id number] DELATORRE    POC Glucose Fingerstick [[id number]]  (Abnormal) Collected:  03/21/17 1627     Specimen:  Blood Updated:  03/21/17 1629     Glucose 209 (H) mg/dL     Narrative:       Meter: [id number] : [id number] Devante Kruse    Hemoglobin & Hematocrit, Blood [[id number]]  (Abnormal) Collected:  03/21/17 1638    Specimen:  Blood Updated:  03/21/17 1707     Hemoglobin 10.2 (L) g/dL      Hematocrit 32.0 (L) %     Protime-INR [[id number]]  (Abnormal) Collected:  03/21/17 1638    Specimen:  Blood Updated:  03/21/17 1714     Protime 14.3 (H) Seconds      INR 1.16 (H)    POC Glucose Fingerstick [[id number]]  (Abnormal) Collected:  03/21/17 2117    Specimen:  Blood Updated:  03/21/17 2118     Glucose 264 (H) mg/dL     Narrative:       Meter: [id number] : [id number] Cullen BEEBE    Hemoglobin & Hematocrit, Blood [[id number]]  (Abnormal) Collected:  03/22/17 0458    Specimen:  Blood Updated:  03/22/17 0549     Hemoglobin 8.8 (L) g/dL      Hematocrit 27.7 (L) %     Protime-INR [[id number]]  (Abnormal) Collected:  03/22/17 0458    Specimen:  Blood Updated:  03/22/17 0554     Protime 14.5 (H) Seconds      INR 1.17 (H)    Hemoglobin A1c [[id number]]  (Abnormal) Collected:  03/22/17 0458    Specimen:  Blood Updated:  03/22/17 0622     Hemoglobin A1C 6.36 (H) %     Narrative:       Hemoglobin A1C Ranges:    Increased Risk for Diabetes  5.7% to 6.4%  Diabetes                     >= 6.5%  Diabetic Goal                < 7.0%    POC Glucose Fingerstick [[id number]]  (Abnormal) Collected:  03/22/17 0615    Specimen:  Blood Updated:  03/22/17 0736     Glucose 183 (H) mg/dL     Narrative:       Meter: [id number] : [id number] Cullen BEEBE          Objective:  General Appearance:  85 y.o. female . Awake and alert.  No complaints at present.     Assessment:  Physical Exam:  Right knee dressing dry and intact .  Moderate edema to RLE, but chronic.  Calf soft and nontender.   Good motion and sensation to right foot and ankle.  Pain well controlled.  HG 8.8 today,but patient is essentially asymptomatic.  She did have some  "light headedness when she first got up with PT, but resolved within a few seconds.  Was able to walk with PT.  Will need to monitor it closely    Vitals:    03/21/17 1715 03/21/17 2300 03/22/17 0300 03/22/17 0807   BP: 129/73 128/72 125/65 102/59   BP Location: Right arm Right arm Right arm Right arm   Patient Position: Lying Sitting Sitting Sitting   Pulse: 80 75 74 76   Resp: 18 16 16 16   Temp:  97.2 °F (36.2 °C) 97.8 °F (36.6 °C) 97.9 °F (36.6 °C)   TempSrc:  Oral Oral Oral   SpO2: 95% 95% 96% 98%   Weight:    165 lb (74.8 kg)   Height:    60\" (152.4 cm)       Plan:    Continue Pain management efforts  Continue mobilization efforts  Continue incisional care  Continue DVT Prophylaxis - Coumadin    Discharge Plan:   Have spoken with her daughter and [facility].     Date: 3/22/2017    Sandra Barrow RN              "

## 2017-03-22 NOTE — PLAN OF CARE
Problem: Patient Care Overview (Adult)  Goal: Plan of Care Review    03/22/17 1553   Coping/Psychosocial Response Interventions   Plan Of Care Reviewed With patient   Patient Care Overview   Progress progress towards functional goals is fair   Outcome Evaluation   Outcome Summary/Follow up Plan Improved tolerance to functional activity this PM with an increase in gait distance and progression of ther. ex. protocol. Pt. still limited overall by fatigue, weakness, and pain in her Right knee.

## 2017-03-22 NOTE — OP NOTE
TOTAL KNEE ARTHROPLASTY OPERATIVE     PATIENT NAME:Rebecca Lorenz    YOB: 1931    ATTENDING PHYSICIAN:Eleazar Head MD    DATE OF PROCEDURE: March 21, 2017    PREOPERATIVE DIAGNOSIS: Severe degenerative osteoarthritis, Right knee.    POSTOPERATIVE DIAGNOSIS: Same with severe Valgus Deformity    PRINCIPAL DIAGNOSIS: Severe DJD right knee with flexion contracture and valgus deformity    PROCEDURE: Right total knee arthroplasty.    SURGEON:  Eleazar Head M.D.    ASSISTANT: Clive Young    ANESTHESIOLOGIST: Pual Mesa MD    ANESTHESIA: Femoral nerve block for pain control and a GA with LMA    POSITION: Supine on OR table    DRAINS: None    COMPLICATIONS: None    ESTIMATED BLOOD LOSS: 200 ml    IMPLANT USED: Angelica Persona total knee replacement system, #6 posterior cruciate retaining femoral component, #D tibia with a 30 mm intramedullary stem, 10 mm thick UC,ultraconstrained Vitamin E impregnated polyethylene insert, # 32 patella anchored into position using [place] antibiotic impregnated bone cement.     SPECIMENS: None    INDICATION: The patient has been having very significant pain and discomfort in the knee along with a severe valgus deformity and limited ability to walk due to her knee arthritis. Duration of complaints was about 4 years.  We had scheduled the patient for total knee replacement after all forms of conservative nonoperative care had failed to provide adequate relief of symptoms.  Patient understood all the risks and benefits which were discussed in great detail including the possibility of death, infection, myocardial infarction, DVT, pulmonary embolism, stiffness of the knee, wound infection and breakdown, possibility of revision surgery, neurovascular compromise, pneumonia, pulmonary embolism      DETAILS OF PROCEDURE: Surgical timeout was called. Operative extremity was correctly identified in the operating room suite. Extremity was prepped and draped in a standard fashion.  Patient was administered antibiotics per the SCIP protocol.  Patient was placed under appropriate anesthetic.      Patient was administered IV antibiotics per SCIP protocol and hospital policy. The patient’s operative extremity was correctly identified in the operating room suite. The patient was placed under appropriate anesthesia. Tourniquet was applied. The leg was prepped and draped in a standard fashion.  An anterior approach was performed and a quad sparing, subvastus approach was carried out. The patellofemoral ligament was resected. Medial soft tissue release was carried out on the medial face of the tibia to balance the soft tissues to the MCL. Tibial Osteophytes were resected. Severe bone-on-bone appearance was noted. Synovectomy was carried out. The Synovium was thickened and hypertrophic. The PCL and MCL were carefully protected throughout the entire procedure. The End Tract [employer] Navigation System was brought into the operating room and the distal femur was mapped. A 10 mm thick cut was made off the distal femur. A measuring guide was used and # 6 posterior cruciate retaining femoral component jig was found to be the best fit. Anterior, posterior and chamfer cuts were created. Care was taken to prevent a distal femoral notch. The proximal tibia was then mapped with navigation. 4 mm of the bone was resected off the lateral tibial plateau. A trial reduction was carried out. Rotation and orientation of the components were carefully marked. Flexion and extension gaps were checked and found to be symmetric. The stability of the components was checked in full extension, mid- flexion and full flexion.The patella was everted, it was measured and cut. Patellar Tracking was excellent. A Lateral release was necessary to ensure proper tracking of the patella. Femoral lug holes were drilled. The Proximal Tibia was die punched. Patellar anchor holes were drilled.    The cancellous bone was lavaged with a Pulse  lavage  and dried. Cement was mixed on the back table. Components were cemented into position sequentially, starting with the tibial component and going to the femur and then the patella. The excess amounts of bone cement were removed from the bone-metal interface. Trial reduction was carried out once the cement was fully cured. A 10 mm UC tibial polyethylene insert was then locked into position on the tibial tray. Wound was lavaged with Bacitracin irrigating solution. Tourniquet was deflated, hemostasis achieved. An analgesic cocktail was injected intra-articularly into the joint capsule and ligamentous insertions on bone for post op pain relief. The arthrotomy was repaired in 60 degrees of flexion. Subcutaneous sutures were applied. Occlusive dressing was applied. No complications were encountered. Sponge count and needle count were correct. The patient was reversed from anesthesia and taken from the operating room to the recovery room in stable condition. I discussed the satisfactory performance of this procedure with the patient’s family and answered all questions for them.

## 2017-03-22 NOTE — PROGRESS NOTES
Acute Care - Physical Therapy Treatment Note  [facility]     Patient Name: Rebecca Peterson  : 1931  MRN: [id number]  Today's Date: 3/22/2017  Onset of Illness/Injury or Date of Surgery Date: 17     Referring Physician: Dr. Eleazar Head    Admit Date: 3/21/2017    Visit Dx:    ICD-10-CM ICD-9-CM   1. Difficulty walking R26.2 719.7   2. Primary osteoarthritis of right knee M17.11 715.16     Patient Active Problem List   Diagnosis   • Primary osteoarthritis of right knee   • Hypertension   • Type 2 diabetes mellitus   • Cardiac murmur   • Dependent edema               Adult Rehabilitation Note       17 1548          Rehab Assessment/Intervention    Discipline physical therapist  -MS      Document Type therapy note (daily note)  -MS      Subjective Information agree to therapy;complains of;weakness;fatigue;pain  -MS      Patient Effort, Rehab Treatment good  -MS      Symptoms Noted Comment Pt. reports pain/soreness in her Right knee this PM as well as feeling tired during ambulation.  Otherwise, pt. agreeable to all P.T.  -MS      Precautions/Limitations fall precautions  -MS      Recorded by [MS] Jose Ferguson, PT      Pain Assessment    Pain Assessment 0-10  -MS      Pain Score 5  -MS      Post Pain Score 5  -MS      Pain Type Acute pain;Surgical pain  -MS      Pain Location Knee  -MS      Pain Orientation Right  -MS      Pain Intervention(s) Medication (See MAR);Cold applied;Repositioned;Elevated;Rest  -MS      Recorded by [MS] Jose Ferguson, PT      ROM (Range of Motion)    General ROM Detail Right Knee AROM (-10, 71)  -MS      Recorded by [MS] Jose Ferguson, PT      Bed Mobility, Assessment/Treatment    Bed Mobility, Comment Pt. up in chair this PM.  -MS      Recorded by [MS] Jose Ferguson, PT      Transfer Assessment/Treatment    Transfers, Sit-Stand [place] minimum assist (75% patient effort)  -MS      Transfers, Stand-Sit [place] minimum assist (75% patient effort)  -MS       Transfers, Sit-Stand-Sit, Assist Device rolling walker  -MS      Recorded by [MS] Jose Ferguson, PT      Gait Assessment/Treatment    Gait, [place] Level contact guard assist;2 person assist required  -MS      Gait, Assistive Device rolling walker  -MS      Gait, Distance (Feet) 25  -MS      Gait, Gait Deviations right:;antalgic;nadir decreased;decreased heel strike;forward flexed posture;narrow base;step length decreased  -MS      Gait, Comment Pt. requires min. verbal/tactile cues for posture correction and proper gait sequencing with use of RWX.  x 2 standing rest breaks due to fatigue.  -MS      Recorded by [MS] Jose Ferguson, PT      Therapy Exercises    Exercise Protocols total knee  -MS      Total Knee Exercises right:;15 reps;completed protocol  -MS      Recorded by [MS] Jose Ferguson PT      Positioning and Restraints    Pre-Treatment Position sitting in chair/recliner  -MS      Post Treatment Position chair  -MS      In Chair notified nsg;reclined;sitting;call light within reach;encouraged to call for assist;with family/caregiver   Ice pack to Right knee.  -MS      Recorded by [MS] Jose Ferguson PT        User Key  (r) = Recorded By, (t) = Taken By, (c) = Cosigned By    Initials Name Effective Dates    MS Jose JUAN RAMON Ferguson, PT 12/01/15 -                 IP PT Goals       03/22/17 1001          Bed Mobility PT LTG    Bed Mobility PT LTG, Date Established 03/22/17  -MAY gramajo (r t))      Bed Mobility PT LTG, Time to Achieve 3 days  -MAY gramajo (r t))      Bed Mobility PT LTG, Activity Type all bed mobility  -MAY gramajo (r t))      Bed Mobility PT LTG, [place] Level conditional independence  -MAY gramajo (r t))      Transfer Training PT LTG    Transfer Training PT LTG, Date Established 03/22/17  -MAY gramajo (r t))      Transfer Training PT LTG, Time to Achieve 3 days  -MAY gramajo (r t))      Transfer Training PT LTG, Activity Type all transfers  -MAY PHIPPS (r t)  MAY gramajo)      Transfer Training PT LTG, [place] Level supervision required  -MAY (isrrael gramajo (t))      Transfer Training PT LTG, Assist Device walker, rolling  -MAY (sameer) DR MAY gramajo (t))      Gait Training PT LTG    Gait Training Goal PT LTG, Date Established 03/22/17  -MAY gramajo (r t))      Gait Training Goal PT LTG, Time to Achieve 3 days  -MAY alvarenga) DR MAY gramajo (t))      Gait Training Goal PT LTG, [place] Level conditional independence  -MAY (sameer) DR MAY gramajo (t))      Gait Training Goal PT LTG, Assist Device walker, rolling  -MAY (sameer) DR MAY gramajo (t))      Gait Training Goal PT LTG, Distance to Achieve 50 ft  -MAY (isrrael gramajo (t))      Range of Motion PT LTG    Range of Motion Goal PT LTG, Date Established 03/22/17  -MAY gramajo (r t))      Range of Motion Goal PT LTG, Time to Achieve 3 days  -MAY (sameer) DR MAY gramajo (t))      Range fo Motion Goal PT LTG, Joint R knee  -MAY alvarenga) DR MAY gramajo (t))      Range of Motion Goal PT LTG, AROM Measure 5 to 90 degrees  -MAY gramajo (r t))      Patient Education PT LTG    Patient Education PT LTG, Date Established 03/22/17  -MAY gramajo (r t))      Patient Education PT LTG, Time to Achieve 3 days  -MAY gramajo (r t))      Patient Education PT LTG, Education Type HEP  -MAY gramajo (r t))      Patient Education PT LTG, Education Understanding verbalize understanding  -MAY gramajo (r t))        User Key  (r) = Recorded By, (t) = Taken By, (c) = Cosigned By    Initials Name Provider Type    MAY Alberts, PT Physical Therapist    DR Sumeet Smith, PT Student PT Student          Physical Therapy Education     Title: PT OT SLP Therapies (Done)     Topic: Physical Therapy (Done)     Point: Mobility training (Done)    Learning Progress Summary    Learner Readiness Method Response Comment Documented by Status   Patient Acceptance ANOOP VIRAMONTES,EUGENIO MOSQUERA 03/22/17 1099 Done    Acceptance E EDIE PHIPPS 03/22/17 0940 Done   Family Acceptance E EDIE PHIPPS 03/22/17 0940 Done               Point: Home exercise  program (Done)    Learning Progress Summary    Learner Readiness Method Response Comment Documented by Status   Patient Acceptance E,D VU,NR  MS 03/22/17 1552 Done    Acceptance E VU  DR 03/22/17 0940 Done   Family Acceptance E VU  DR 03/22/17 0940 Done               Point: Body mechanics (Done)    Learning Progress Summary    Learner Readiness Method Response Comment Documented by Status   Patient Acceptance E,D VU,NR  MS 03/22/17 1552 Done    Acceptance E VU  DR 03/22/17 0940 Done   Family Acceptance E VU  DR 03/22/17 0940 Done               Point: Precautions (Done)    Learning Progress Summary    Learner Readiness Method Response Comment Documented by Status   Patient Acceptance E,D VU,NR  MS 03/22/17 1552 Done    Acceptance E VU  DR 03/22/17 0940 Done   Family Acceptance E VU  DR 03/22/17 0940 Done                      User Key     Initials Effective Dates Name Provider Type Discipline    MS 12/01/15 -  Jose Ferguson, PT Physical Therapist PT     03/07/17 -  Sumeet Smith, PT Student PT Student PT                    PT Recommendation and Plan  Anticipated Equipment Needs At Discharge:  (none)  Anticipated Discharge Disposition: skilled nursing facility  Planned Therapy Interventions: bed mobility training, gait training, home exercise program, ROM (Range of Motion), strengthening  PT Frequency: 2 times/day  Plan of Care Review  Plan Of Care Reviewed With: patient  Progress: progress towards functional goals is fair  Outcome Summary/Follow up Plan: Improved tolerance to functional activity this PM with an increase in gait distance and progression of ther. ex. protocol.  Pt. still limited overall by fatigue, weakness, and pain in her Right knee.          Outcome Measures       03/22/17 1500 03/22/17 0900       How much help from another person do you currently need...    Turning from your back to your side while in flat bed without using bedrails? 4  -MS 4  -MAY (r)  (t) MAY (c)     Moving from lying on back to  sitting on the side of a flat bed without bedrails? 3  -MS 3  -MAY (r) DR mortensen) MAY (jenna)     Moving to and from a bed to a chair (including a wheelchair)? 3  -MS 3  -MAY (r) DR mortensen) MAY (jenna)     Standing up from a chair using your arms (e.g., wheelchair, bedside chair)? 3  -MS 3  -KH (r) DR mortensen) MAY (jenna)     Climbing 3-5 steps with a railing? 2  -MS 2  -MAY (r) DR mortensen) MAY (jenna)     To walk in hospital room? 3  -MS 3  -MAY (r)  (izaiah) MAY (jenna)     AM-PAC 6 Clicks Score 18  -MS 18  -MAY (r)  (izaiah)     Functional Assessment    Outcome Measure Options AM-PAC 6 Clicks Basic Mobility (PT)  -MS AM-PAC 6 Clicks Basic Mobility (PT)  -MAY (sameer) DR mortensen) MAY (jenna)       User Key  (r) = Recorded By, (t) = Taken By, (c) = Cosigned By    Initials Name Provider Type    MAY Alberts, PT Physical Therapist    MS Jose Ferguson, PT Physical Therapist    DR Sumeet Smith, PT Student PT Student           Time Calculation:         PT Charges       03/22/17 1554 03/22/17 1000       Time Calculation    Start Time 1512  -MS 0923  -MAY (sameer)  (izaiah) MYA (c)     Stop Time 1549  -MS 0954  -MAY (sameer) DR mortensen) MAY (jenna)     Time Calculation (min) 37 min  -MS 31 min  -MAY mortensen (r))     PT Received On 03/22/17  -MS 03/22/17  -MAY alvarenga) DR MAY (t) (jenna)     PT - Next Appointment 03/23/17  -MS 03/22/17  -MAY alvarenga) DR MAY (t) (jenna)     PT Goal Re-Cert Due Date  03/25/17  -MAY OLVERA (r t) (jenna)       User Key  (r) = Recorded By, (t) = Taken By, (c) = Cosigned By    Initials Name Provider Type    MAY Alberts, PT Physical Therapist    MS Jose Ferguson, PT Physical Therapist    DR Sumeet Smith, PT Student PT Student          Therapy Charges for Today     Code Description Service Date Service Provider Modifiers Qty    34432909644 HC PT THER PROC GROUP 3/22/2017 Jose Ferguson, PT GP 1    28866277700 HC PT THER PROC EA 15 MIN 3/22/2017 Jose Ferguson, PT GP 1          PT G-Codes  Outcome Measure Options: AM-PAC 6 Clicks Basic Mobility (PT)    Jose Ferguson,  PT  3/22/2017

## 2017-03-22 NOTE — PLAN OF CARE
Problem: Patient Care Overview (Adult)  Goal: Plan of Care Review  Outcome: Ongoing (interventions implemented as appropriate)    03/22/17 6714   Coping/Psychosocial Response Interventions   Plan Of Care Reviewed With patient   Patient Care Overview   Progress improving   Outcome Evaluation   Outcome Summary/Follow up Plan pain under control and ambulating well       Goal: Adult Individualization and Mutuality  Outcome: Ongoing (interventions implemented as appropriate)  Goal: Discharge Needs Assessment  Outcome: Ongoing (interventions implemented as appropriate)    Problem: Perioperative Period (Adult)  Goal: Signs and Symptoms of Listed Potential Problems Will be Absent or Manageable (Perioperative Period)  Outcome: Ongoing (interventions implemented as appropriate)    Problem: Fall Risk (Adult)  Goal: Absence of Falls  Outcome: Ongoing (interventions implemented as appropriate)    Problem: Knee Replacement, Total (Adult)  Goal: Signs and Symptoms of Listed Potential Problems Will be Absent or Manageable (Knee Replacement, Total)  Outcome: Ongoing (interventions implemented as appropriate)

## 2017-03-22 NOTE — PROGRESS NOTES
Acute Care - Physical Therapy Initial Evaluation  [facility]     Patient Name: Rebecca Peterson  : 1931  MRN: [id number]  Today's Date: 3/22/2017   Onset of Illness/Injury or Date of Surgery Date: 17     Referring Physician: Dr. Eleazar Head      Admit Date: 3/21/2017     Visit Dx:    ICD-10-CM ICD-9-CM   1. Difficulty walking R26.2 719.7   2. Primary osteoarthritis of right knee M17.11 715.16     Patient Active Problem List   Diagnosis   • Primary osteoarthritis of right knee   • Hypertension   • Type 2 diabetes mellitus   • Cardiac murmur   • Dependent edema     Past Medical History:   Diagnosis Date   • Acid reflux    • Cataract     BILATERAL   • Dependent edema    • Diabetes mellitus    • Hyperlipidemia    • Hypertension    • Osteoarthritis      Past Surgical History:   Procedure Laterality Date   • ANKLE SURGERY Right    • HYSTERECTOMY     • TOTAL KNEE ARTHROPLASTY Right 3/21/2017    Procedure: TOTAL KNEE ARTHROPLASTY ;  Surgeon: Eleazar Head MD;  Location: [facility] OR;  Service:           PT ASSESSMENT (last 72 hours)      PT Evaluation       17 0917 17 1602    Rehab Evaluation    Document Type evaluation  -MAY (sameer) DR MAY gramajo (t))     Subjective Information agree to therapy;complains of;weakness  -MAY alvarenga) DR MAY gramajo (t))     Patient Effort, Rehab Treatment good  -MAY alvarenga) DR MAY gramajo (t))     General Information    Onset of Illness/Injury or Date of Surgery Date 17  -MAY alvarenga) DR MAY gramajo (t))     Referring Physician Dr. Eleazar Head  -MAY alvarenga) DR MAY gramajo (t))     General Observations In chair  -MAY alvarenga) DR MAY gramajo (t))     Pertinent History Of Current Problem s/p R TKA  -MAY gramajo (r t))     Precautions/Limitations fall precautions  -MAY gramajo (r t))     Equipment Currently Used at Home walker, rolling  -MAY gramajo (r t))     Plans/Goals Discussed With patient;family  -MAY alvarenga) DR MAY gramajo (t))     Barriers to Rehab none identified  -MAY gramajo (r t))     Living Environment    Lives With  child(abner), adult  -MAY gramajo (r t)) child(abner), adult  -KR    Living Arrangements house  -MAY alvarenga) DR MAY gramajo (t)) house  -KR    Home Accessibility stairs to enter home  -MAY alvarenga) DR MAY gramajo (t)) no concerns;bed and bath on same level  -KR    Number of Stairs to Enter Home 1  -MAY gramajo (r t)) 1  -KR    Stair Railings at Home none  -MAY alvarenga) DR MAY gramajo (t)) none  -KR    Type of Financial/Environmental Concern none  -MAY alvarenga) DR MAY gramajo (t)) none  -KR    Transportation Available family or friend will provide  -MAY alvarenga) DR MAY gramajo (t)) car;family or friend will provide  -MARBELLA    Living Environment Comment lives with daughter  -MAY alvarenga) DR MAY gramajo (t)) lives with daughter  -KR    Clinical Impression    Patient/Family Goals Statement Pt wants to d/c to snu  -MAY gramajo (r t))     Criteria for Skilled Therapeutic Interventions Met treatment indicated  -MAY alvarenga) DR MAY gramajo (t))     Impairments Found (describe specific impairments) ROM;muscle performance;gait, locomotion, and balance  -MAY gramajo (r t))     Rehab Potential good, to achieve stated therapy goals  -MAY gramajo (r t))     Pain Assessment    Pain Assessment FLACC  -MAY gramajo (r t))     Pain Score 4  -MAY gramajo (r t))     Pain Type Acute pain;Surgical pain  -MAY gramajo (r t))     Pain Location Knee  -MAY gramajo (r t))     Pain Orientation Right  -MAY gramajo (r t))     Pain Intervention(s) Repositioned;Ambulation/increased activity  -MAY gramajo (r t))     Response to Interventions tolerated  -MAY gramajo (r t))     Cognitive Assessment/Intervention    Current Cognitive/Communication Assessment functional  -MAY gramajo (r t))     Orientation Status oriented x 4  -MAY gramajo (r t))     Follows Commands/Answers Questions 100% of the time  -MAY gramajo (r t))     Personal Safety WNL/WFL  -MAY Ashsameer) DR mortensen) MAY (jenna)     Personal Safety Interventions gait belt;muscle strengthening facilitated;nonskid shoes/slippers when out of bed  -MAY alvarenga) DR mortensen) MAY gramajo)      ROM (Range of Motion)    General ROM Detail WFL except R knee (10 to 60)  -MAY (isrrael gramajo (t))     MMT (Manual Muscle Testing)    General MMT Assessment Detail WFL except R knee  -MAY gramajo (r t))     Bed Mobility, Assessment/Treatment    Bed Mobility, Assistive Device bed rails  -MAY gramajo (r t))     Bed Mobility, Roll Left, Perry not tested  -MAY gramajo (r t))     Bed Mobility, Scoot/Bridge, Perry contact guard assist  -MAY gramajo (r t))     Bed Mob, Supine to Sit, [place] not tested  -MAY gramajo (r t))     Bed Mob, Sit to Supine, [place] minimum assist (75% patient effort);verbal cues required  -MAY gramajo (r t))     Bed Mobility, Impairments ROM decreased;strength decreased  -MAY gramajo (r t))     Transfer Assessment/Treatment    Transfers, Chair-Bed [place] minimum assist (75% patient effort);2 person assist required  -MAY gramajo (r t))     Transfers, Sit-Stand Perry verbal cues required;contact guard assist;2 person assist required  -MAY gramajo (r t))     Transfers, Stand-Sit [place] verbal cues required;minimum assist (75% patient effort);2 person assist required  -MAY gramajo (r t))     Transfers, Sit-Stand-Sit, Assist Device rolling walker  -MAY gramajo (r t))     Transfer, Safety Issues balance decreased during turns;step length decreased  -MAY gramajo (r t))     Transfer, Impairments ROM decreased;strength decreased  -MAY gramajo (r t))     Transfer, Comment Pt required verbal cues for sequencing with rolling walker.  -MAY gramajo (r t))     Gait Assessment/Treatment    Gait, [place] Level contact guard assist;2 person assist required  -MAY gramajo (r t))     Gait, Assistive Device rolling walker  -MAY gramajo (r t))     Gait, Distance (Feet) 12  -MAY gramajo (r t))     Gait, Gait Deviations right:;antalgic;nadir decreased;step length decreased  -MAY PHIPPS (r t) MAY (c)     Gait, Safety Issues balance decreased  during turns  -MAY gramajo (r t))     Therapy Exercises    Exercise Protocols total knee  -MAY gramajo (r t))     Total Knee Exercises right:;10 reps;completed protocol  -MAY gramajo (r t))     Positioning and Restraints    Pre-Treatment Position sitting in chair/recliner  -MAY gramajo (r t))     Post Treatment Position bed  -MAY gramajo (r t))     In Bed notified nsg;supine;call light within reach;encouraged to call for assist;CPM RLE  -MAY gramajo (r t))       03/21/17 1600 03/21/17 1103    General Information    Equipment Currently Used at Home walker, rolling  -KR     Living Environment    Lives With  child(abner), adult  -KM    Living Arrangements  house  -KM    Home Accessibility  no concerns  -KM    Transportation Available  car  -KM      User Key  (r) = Recorded By, (t) = Taken By, (c) = Cosigned By    Initials Name Provider Type    MAY Alberts, PT Physical Therapist    MARBELLA Hamilton, RN Registered Nurse    JM Bass, RN Registered Nurse    DR Sumeet Smith, PT Student PT Student          Physical Therapy Education     Title: PT OT SLP Therapies (Done)     Topic: Physical Therapy (Done)     Point: Mobility training (Done)    Learning Progress Summary    Learner Readiness Method Response Comment Documented by Status   Patient Acceptance E EDIE PHIPPS 03/22/17 0940 Done   Family Acceptance E EDIE PHIPPS 03/22/17 0940 Done               Point: Home exercise program (Done)    Learning Progress Summary    Learner Readiness Method Response Comment Documented by Status   Patient Acceptance E EDIE PHIPPS 03/22/17 0940 Done   Family Acceptance E EDIE PHIPPS 03/22/17 0940 Done               Point: Body mechanics (Done)    Learning Progress Summary    Learner Readiness Method Response Comment Documented by Status   Patient Acceptance E EDIE PHIPPS 03/22/17 0940 Done   Family Acceptance E EDIE PHIPPS 03/22/17 0940 Done               Point: Precautions (Done)    Learning Progress Summary    Learner Readiness Method  Response Comment Documented by Status   Patient Acceptance E EDIE PHIPPS 03/22/17 0940 Done   Family Acceptance E EDIE PHIPPS 03/22/17 0940 Done                      User Key     Initials Effective Dates Name Provider Type Discipline     03/07/17 -  Sumeet Smith, PT Student PT Student PT                PT Recommendation and Plan  Anticipated Equipment Needs At Discharge:  (none)  Anticipated Discharge Disposition: skilled nursing facility  Planned Therapy Interventions: bed mobility training, gait training, home exercise program, ROM (Range of Motion), strengthening  PT Frequency: 2 times/day  Plan of Care Review  Plan Of Care Reviewed With: patient, daughter  Outcome Summary/Follow up Plan: Pt demonstrated ability to perform exercises with minimal c/o pain. Pt ambulated 12 feet and transferred from chair to bed with min Ax2 and rolling walker. Pt would benefit from inpt. physical therapy in order to improve strength, ROM, ambulation, and functional transfers.          IP PT Goals       03/22/17 1001          Bed Mobility PT LTG    Bed Mobility PT LTG, Date Established 03/22/17  -MAY gramajo (r t))      Bed Mobility PT LTG, Time to Achieve 3 days  -MAY gramajo (r t))      Bed Mobility PT LTG, Activity Type all bed mobility  -MAY gramajo (r t))      Bed Mobility PT LTG, [place] Level conditional independence  -MAY gramajo (r t))      Transfer Training PT LTG    Transfer Training PT LTG, Date Established 03/22/17  -MAY gramajo (r t))      Transfer Training PT LTG, Time to Achieve 3 days  -MAY gramajo (r t))      Transfer Training PT LTG, Activity Type all transfers  -MAY gramajo (r t))      Transfer Training PT LTG, [place] Level supervision required  -MAY gramajo (r t))      Transfer Training PT LTG, Assist Device walker, rolling  -MAY gramajo (r t))      Gait Training PT LTG    Gait Training Goal PT LTG, Date Established 03/22/17  -MAY gramajo (r t))      Gait Training Goal PT LTG, Time to  Achieve 3 days  -MAY gramajo (r t))      Gait Training Goal PT LTG, [place] Level conditional independence  -MAY gramajo (r t))      Gait Training Goal PT LTG, Assist Device walker, rolling  -MAY gramajo (r t))      Gait Training Goal PT LTG, Distance to Achieve 50 ft  -MAY gramajo (r t))      Range of Motion PT LTG    Range of Motion Goal PT LTG, Date Established 03/22/17  -MAY gramajo (r t))      Range of Motion Goal PT LTG, Time to Achieve 3 days  -MAY gramajo (r t))      Range fo Motion Goal PT LTG, Joint R knee  -MAY gramajo (r t))      Range of Motion Goal PT LTG, AROM Measure 5 to 90 degrees  -MAY gramajo (r t))      Patient Education PT LTG    Patient Education PT LTG, Date Established 03/22/17  -MAY gramajo (r t))      Patient Education PT LTG, Time to Achieve 3 days  -MAY gramajo (r t))      Patient Education PT LTG, Education Type HEP  -MAY (isrrael gramajo (t))      Patient Education PT LTG, Education Understanding verbalize understanding  -MAY gramajo (r t))        User Key  (r) = Recorded By, (t) = Taken By, (c) = Cosigned By    Initials Name Provider Type     Raina Alberts, PT Physical Therapist    DR Sumeet Smith, PT Student PT Student                Outcome Measures       03/22/17 0900          How much help from another person do you currently need...    Turning from your back to your side while in flat bed without using bedrails? 4  -MAY (isrrael gramajo (t))      Moving from lying on back to sitting on the side of a flat bed without bedrails? 3  -MAY gramajo (r t))      Moving to and from a bed to a chair (including a wheelchair)? 3  -MAY (isrrael gramajo (t))      Standing up from a chair using your arms (e.g., wheelchair, bedside chair)? 3  -MAY (isrrael gramajo (t))      Climbing 3-5 steps with a railing? 2  -MAY alvarenga) DR mortensen) MAY gramajo)      To walk in hospital room? 3  -MAY alvarenga) DR mortensen) MAY gramajo)      AM-PAC 6 Clicks Score 18  -MAY mortensen (r))      Functional Assessment    Outcome Measure  Options AM-PAC 6 Clicks Basic Mobility (PT)  -MAY alvarenga) DR mortensen) MAY (jenna)        User Key  (r) = Recorded By, (t) = Taken By, (c) = Cosigned By    Initials Name Provider Type    MAY Albetrs, PT Physical Therapist    DR Sumeet Smith, PT Student PT Student           Time Calculation:         PT Charges       03/22/17 1000          Time Calculation    Start Time 0923  -MAY alvarenga) DR MAY gramajo (t))      Stop Time 0954  -MAY alvarenga) DR MAY gramajo (t))      Time Calculation (min) 31 min  -MAY mortensen (r))      PT Received On 03/22/17  -MAY alvarenga) DR MAY gramajo (t))      PT - Next Appointment 03/22/17  -MAY alvarenga) DR MAY gramajo (t))      PT Goal Re-Cert Due Date 03/25/17  -MAY alvarenga) DR MAY gramajo (t))        User Key  (r) = Recorded By, (t) = Taken By, (c) = Cosigned By    Initials Name Provider Type    MAY Alberts, PT Physical Therapist    DR Sumeet Smith, PT Student PT Student          Therapy Charges for Today     Code Description Service Date Service Provider Modifiers Qty    58823773330 HC PT EVAL LOW COMPLEXITY 2 3/22/2017 Sumeet Smith, PT Student GP 1    88507496442 HC PT THER PROC EA 15 MIN 3/22/2017 Sumeet Smith PT Student GP 1    58472038839 HC PT THER SUPP EA 15 MIN 3/22/2017 Sumeet Smith PT Student GP 1    67187803585 HC PT ROM CPM INITIAL 3/22/2017 Sumeet Smith PT Student GP 1          PT G-Codes  Outcome Measure Options: AM-PAC 6 Clicks Basic Mobility (PT)      Sumeet Smith PT Student  3/22/2017

## 2017-03-22 NOTE — SIGNIFICANT NOTE
03/22/17 1229   Rehab Treatment   Discipline occupational therapist   Rehab Evaluation   Evaluation Not Performed other (see comments)  (Pt to d/c to SNF, will defer OT eval for AE until on SNF.)

## 2017-03-23 PROBLEM — D62 POSTOPERATIVE ANEMIA DUE TO ACUTE BLOOD LOSS: Status: ACTIVE | Noted: 2017-03-23

## 2017-03-23 LAB
ABO GROUP BLD: NORMAL
ANION GAP SERPL CALCULATED.3IONS-SCNC: 12.1 MMOL/L
BLD GP AB SCN SERPL QL: NEGATIVE
BUN BLD-MCNC: 24 MG/DL (ref 8–23)
BUN/CREAT SERPL: 28.2 (ref 7–25)
CALCIUM SPEC-SCNC: 9.6 MG/DL (ref 8.6–10.5)
CHLORIDE SERPL-SCNC: 98 MMOL/L (ref 98–107)
CO2 SERPL-SCNC: 23.9 MMOL/L (ref 22–29)
CREAT BLD-MCNC: 0.85 MG/DL (ref 0.57–1)
GFR SERPL CREATININE-BSD FRML MDRD: 77 ML/MIN/1.73
GLUCOSE BLD-MCNC: 153 MG/DL (ref 65–99)
GLUCOSE BLDC GLUCOMTR-MCNC: 131 MG/DL (ref 70–130)
GLUCOSE BLDC GLUCOMTR-MCNC: 165 MG/DL (ref 70–130)
GLUCOSE BLDC GLUCOMTR-MCNC: 168 MG/DL (ref 70–130)
GLUCOSE BLDC GLUCOMTR-MCNC: 181 MG/DL (ref 70–130)
HCT VFR BLD AUTO: 23.5 % (ref 35.6–45.5)
HCT VFR BLD AUTO: 23.7 % (ref 35.6–45.5)
HGB BLD-MCNC: 7.6 G/DL (ref 11.9–15.5)
HGB BLD-MCNC: 7.8 G/DL (ref 11.9–15.5)
INR PPP: 1.49 (ref 0.9–1.1)
POTASSIUM BLD-SCNC: 4.3 MMOL/L (ref 3.5–5.2)
PROTHROMBIN TIME: 17.5 SECONDS (ref 11.7–14.2)
RH BLD: POSITIVE
SODIUM BLD-SCNC: 134 MMOL/L (ref 136–145)

## 2017-03-23 PROCEDURE — 85018 HEMOGLOBIN: CPT | Performed by: INTERNAL MEDICINE

## 2017-03-23 PROCEDURE — 86900 BLOOD TYPING SEROLOGIC ABO: CPT | Performed by: ORTHOPAEDIC SURGERY

## 2017-03-23 PROCEDURE — 80048 BASIC METABOLIC PNL TOTAL CA: CPT | Performed by: HOSPITALIST

## 2017-03-23 PROCEDURE — 86901 BLOOD TYPING SEROLOGIC RH(D): CPT | Performed by: ORTHOPAEDIC SURGERY

## 2017-03-23 PROCEDURE — 97150 GROUP THERAPEUTIC PROCEDURES: CPT

## 2017-03-23 PROCEDURE — 86900 BLOOD TYPING SEROLOGIC ABO: CPT

## 2017-03-23 PROCEDURE — 86850 RBC ANTIBODY SCREEN: CPT | Performed by: ORTHOPAEDIC SURGERY

## 2017-03-23 PROCEDURE — 30233N1 TRANSFUSION OF NONAUTOLOGOUS RED BLOOD CELLS INTO PERIPHERAL VEIN, PERCUTANEOUS APPROACH: ICD-10-PCS | Performed by: ORTHOPAEDIC SURGERY

## 2017-03-23 PROCEDURE — 97110 THERAPEUTIC EXERCISES: CPT

## 2017-03-23 PROCEDURE — 86901 BLOOD TYPING SEROLOGIC RH(D): CPT

## 2017-03-23 PROCEDURE — 85014 HEMATOCRIT: CPT | Performed by: INTERNAL MEDICINE

## 2017-03-23 PROCEDURE — 85014 HEMATOCRIT: CPT | Performed by: ORTHOPAEDIC SURGERY

## 2017-03-23 PROCEDURE — 36430 TRANSFUSION BLD/BLD COMPNT: CPT

## 2017-03-23 PROCEDURE — P9016 RBC LEUKOCYTES REDUCED: HCPCS

## 2017-03-23 PROCEDURE — 85018 HEMOGLOBIN: CPT | Performed by: ORTHOPAEDIC SURGERY

## 2017-03-23 PROCEDURE — 85610 PROTHROMBIN TIME: CPT | Performed by: ORTHOPAEDIC SURGERY

## 2017-03-23 PROCEDURE — 99024 POSTOP FOLLOW-UP VISIT: CPT | Performed by: ORTHOPAEDIC SURGERY

## 2017-03-23 PROCEDURE — 86923 COMPATIBILITY TEST ELECTRIC: CPT

## 2017-03-23 PROCEDURE — 82962 GLUCOSE BLOOD TEST: CPT

## 2017-03-23 PROCEDURE — 63710000001 INSULIN ASPART PER 5 UNITS: Performed by: HOSPITALIST

## 2017-03-23 RX ORDER — LOSARTAN POTASSIUM 25 MG/1
25 TABLET ORAL
Status: DISCONTINUED | OUTPATIENT
Start: 2017-03-23 | End: 2017-03-25 | Stop reason: HOSPADM

## 2017-03-23 RX ADMIN — OXYCODONE HYDROCHLORIDE AND ACETAMINOPHEN 2 TABLET: 7.5; 325 TABLET ORAL at 13:15

## 2017-03-23 RX ADMIN — DOCUSATE SODIUM,SENNOSIDES 2 TABLET: 50; 8.6 TABLET, FILM COATED ORAL at 18:06

## 2017-03-23 RX ADMIN — OXYCODONE HYDROCHLORIDE AND ACETAMINOPHEN 2 TABLET: 7.5; 325 TABLET ORAL at 08:48

## 2017-03-23 RX ADMIN — DOCUSATE SODIUM,SENNOSIDES 2 TABLET: 50; 8.6 TABLET, FILM COATED ORAL at 08:34

## 2017-03-23 RX ADMIN — OXYCODONE HYDROCHLORIDE AND ACETAMINOPHEN 2 TABLET: 7.5; 325 TABLET ORAL at 04:38

## 2017-03-23 RX ADMIN — ATORVASTATIN CALCIUM 20 MG: 20 TABLET, FILM COATED ORAL at 08:34

## 2017-03-23 RX ADMIN — INSULIN ASPART 2 UNITS: 100 INJECTION, SOLUTION INTRAVENOUS; SUBCUTANEOUS at 18:07

## 2017-03-23 RX ADMIN — INSULIN ASPART 2 UNITS: 100 INJECTION, SOLUTION INTRAVENOUS; SUBCUTANEOUS at 08:34

## 2017-03-23 RX ADMIN — METFORMIN HYDROCHLORIDE 500 MG: 500 TABLET ORAL at 18:06

## 2017-03-23 RX ADMIN — WARFARIN SODIUM 4 MG: 4 TABLET ORAL at 18:06

## 2017-03-23 RX ADMIN — MUPIROCIN: 20 OINTMENT TOPICAL at 08:34

## 2017-03-23 RX ADMIN — INSULIN ASPART 2 UNITS: 100 INJECTION, SOLUTION INTRAVENOUS; SUBCUTANEOUS at 12:17

## 2017-03-23 NOTE — PLAN OF CARE
Problem: Patient Care Overview (Adult)  Goal: Plan of Care Review    03/23/17 1525   Coping/Psychosocial Response Interventions   Plan Of Care Reviewed With patient;family   Patient Care Overview   Progress improving   Outcome Evaluation   Outcome Summary/Follow up Plan Despite continued soreness and low Hgb this day, pt. able to increase her gait distance (60 feet) and progress in ther. ex. protocol. Pt. still requires verbal/tactile cues for posture correction, RWX guidance, to increase her bilateral step length and heel strike, and for proper gait sequencing with use of RWX.

## 2017-03-23 NOTE — PROGRESS NOTES
Orthopedic Progress Note      Patient: Rebecca Peterson    YOB: 1931    Medical Record Number: [id number]    Attending Physician: Eleazar Head MD    Date of Admission: 3/21/2017 10:14 AM    Status Post: NE TOTAL KNEE ARTHROPLASTY [58743] (TOTAL KNEE ARTHROPLASTY with Tania Navigation)    Post Operative Day Number: 2    Admitting Dx: Primary osteoarthritis of right knee [M17.11]  Primary osteoarthritis of right knee [M17.11]    Current Problem List:   Patient Active Problem List   Diagnosis   • Primary osteoarthritis of right knee   • Hypertension   • Type 2 diabetes mellitus   • Cardiac murmur   • Dependent edema   • Postoperative anemia due to acute blood loss         Past Medical History:   Diagnosis Date   • Acid reflux    • Cataract     BILATERAL   • Dependent edema    • Diabetes mellitus    • Hyperlipidemia    • Hypertension    • Osteoarthritis        Current Medications:  Scheduled Meds:  atorvastatin 20 mg Oral Daily   insulin aspart 0-9 Units Subcutaneous 4x Daily AC & at Bedtime   losartan 25 mg Oral Daily With Lunch   metFORMIN 500 mg Oral Daily Before Supper   mupirocin  Each Nare BID   sennosides-docusate sodium 2 tablet Oral BID   warfarin 4 mg Oral Daily     PRN Meds:.•  acetaminophen  •  acetaminophen  •  bisacodyl  •  bisacodyl  •  dextrose  •  dextrose  •  diphenhydrAMINE  •  docusate sodium  •  glucagon (human recombinant)  •  HYDROmorphone **AND** naloxone  •  magnesium hydroxide  •  melatonin  •  ondansetron **OR** ondansetron ODT **OR** ondansetron  •  ondansetron  •  oxyCODONE-acetaminophen  •  oxyCODONE-acetaminophen  •  pantoprazole    Diagnostic Tests:    Lab Results (last 24 hours)     Procedure Component Value Units Date/Time    POC Glucose Fingerstick [[id number]]  (Abnormal) Collected:  03/22/17 1652    Specimen:  Blood Updated:  03/22/17 1656     Glucose 196 (H) mg/dL     Narrative:       Meter: [id number] : [id number] Jared Zendejas    POC Glucose Fingerstick  [[id number]]  (Abnormal) Collected:  03/22/17 2121    Specimen:  Blood Updated:  03/22/17 2122     Glucose 158 (H) mg/dL     Narrative:       Meter: [id number] : [id number] Cullen BEEBE    Hemoglobin & Hematocrit, Blood [[id number]]  (Abnormal) Collected:  03/23/17 0406    Specimen:  Blood Updated:  03/23/17 0431     Hemoglobin 7.6 (L) g/dL      Hematocrit 23.7 (L) %     Protime-INR [[id number]]  (Abnormal) Collected:  03/23/17 0406    Specimen:  Blood Updated:  03/23/17 0437     Protime 17.5 (H) Seconds      INR 1.49 (H)    Basic Metabolic Panel [[id number]]  (Abnormal) Collected:  03/23/17 0406    Specimen:  Blood Updated:  03/23/17 0452     Glucose 153 (H) mg/dL      BUN 24 (H) mg/dL      Creatinine 0.85 mg/dL      Sodium 134 (L) mmol/L      Potassium 4.3 mmol/L      Chloride 98 mmol/L      CO2 23.9 mmol/L      Calcium 9.6 mg/dL      eGFR   Amer 77 mL/min/1.73      BUN/Creatinine Ratio 28.2 (H)     Anion Gap 12.1 mmol/L     Narrative:       The MDRD GFR formula is only valid for adults with stable renal function between ages 18 and 70.    POC Glucose Fingerstick [[id number]]  (Abnormal) Collected:  03/23/17 0622    Specimen:  Blood Updated:  03/23/17 0806     Glucose 181 (H) mg/dL     Narrative:       Meter: [id number] : [id number] Cullen BEEBE    POC Glucose Fingerstick [[id number]]  (Abnormal) Collected:  03/23/17 1129    Specimen:  Blood Updated:  03/23/17 1131     Glucose 168 (H) mg/dL     Narrative:       Meter: [id number] : [id number] Kumar Mata    Hemoglobin & Hematocrit, Blood [[id number]] Collected:  03/23/17 1214    Specimen:  Blood Updated:  03/23/17 1221          Objective:  General Appearance:  85 y.o. female . Awake and alert.  No complaints at present    Assessment:  Physical Exam:  Right knee dressing dry and intact.  Calf soft and nontender.  Peripheral edema to RLE unchanged.  AROM -10-71 degrees.  BP low.  Hg. 7.6.  Will transfuse 2 units PBC.   Pain well controlled.     Vitals:     03/22/17 2300 03/23/17 0300 03/23/17 0700 03/23/17 1216   BP: 113/61 120/59 112/54 95/53   BP Location: Right arm Right arm Right arm Left arm   Patient Position: Sitting Sitting Sitting Sitting   Pulse: 82 84 87 89   Resp: 16 16 16 20   Temp: 97.9 °F (36.6 °C) 97.3 °F (36.3 °C) 98.3 °F (36.8 °C) 97.8 °F (36.6 °C)   TempSrc: Oral Oral Oral Oral   SpO2: 92% 97% 94% 93%   Weight:       Height:           Plan:    Continue Pain management efforts  Continue mobilization efforts  Continue incisional care  Continue DVT Prophylaxis - Coumadin    Discharge Plan:  Plans to go to [facility] tomorrow or Saturday when bed available.     Date: 3/23/2017    ROCAEL Dorsey MD

## 2017-03-23 NOTE — PROGRESS NOTES
Acute Care - Physical Therapy Treatment Note  [facility]     Patient Name: Rebecca Peterson  : 1931  MRN: [id number]  Today's Date: 3/23/2017  Onset of Illness/Injury or Date of Surgery Date: 17     Referring Physician: Dr. Eleazar Head    Admit Date: 3/21/2017    Visit Dx:    ICD-10-CM ICD-9-CM   1. Difficulty walking R26.2 719.7   2. Primary osteoarthritis of right knee M17.11 715.16     Patient Active Problem List   Diagnosis   • Primary osteoarthritis of right knee   • Hypertension   • Type 2 diabetes mellitus   • Cardiac murmur   • Dependent edema   • Postoperative anemia due to acute blood loss               Adult Rehabilitation Note       17 1132 17 1548       Rehab Assessment/Intervention    Discipline physical therapist  -MS physical therapist  -MS     Document Type therapy note (daily note)  -MS therapy note (daily note)  -MS     Subjective Information agree to therapy;complains of;fatigue;pain  -MS agree to therapy;complains of;weakness;fatigue;pain  -MS     Patient Effort, Rehab Treatment good  -MS good  -MS     Symptoms Noted Comment  Pt. reports pain/soreness in her Right knee this PM as well as feeling tired during ambulation.  Otherwise, pt. agreeable to all P.T.  -MS     Precautions/Limitations fall precautions  -MS fall precautions  -MS     Recorded by [MS] Jose Ferguson, PT [MS] Jose Ferguson, PT     Pain Assessment    Pain Assessment 0-10  -MS 0-10  -MS     Pain Score 3  -MS 5  -MS     Post Pain Score 3  -MS 5  -MS     Pain Type Acute pain;Surgical pain  -MS Acute pain;Surgical pain  -MS     Pain Location Knee  -MS Knee  -MS     Pain Orientation Right  -MS Right  -MS     Pain Intervention(s)  Medication (See MAR);Cold applied;Repositioned;Elevated;Rest  -MS     Recorded by [MS] Jose Ferguson, PT [MS] Jose Ferguson, PT     Cognitive Assessment/Intervention    Current Cognitive/Communication Assessment functional  -MS      Orientation Status oriented x 4  -MS       Follows Commands/Answers Questions 100% of the time  -MS      Personal Safety WNL/WFL  -MS      Personal Safety Interventions fall prevention program maintained;gait belt;nonskid shoes/slippers when out of bed;supervised activity  -MS      Recorded by [MS] Jose Ferguson PT      ROM (Range of Motion)    General ROM Detail Right knee AROM (-9, 76)  -MS Right Knee AROM (-10, 71)  -MS     Recorded by [MS] Jose Ferguson PT [MS] Jose Ferguson PT     Bed Mobility, Assessment/Treatment    Bed Mobility, Comment Up in chair this AM.  -MS Pt. up in chair this PM.  -MS     Recorded by [MS] Jose Ferguson PT [MS] Jose Ferguson PT     Transfer Assessment/Treatment    Transfers, Sit-Stand [place] contact guard assist  -MS minimum assist (75% patient effort)  -MS     Transfers, Stand-Sit [place] contact guard assist  -MS minimum assist (75% patient effort)  -MS     Transfers, Sit-Stand-Sit, Assist Device rolling walker  -MS rolling walker  -MS     Recorded by [MS] Jose Ferguson PT [MS] Jose Ferguson PT     Gait Assessment/Treatment    Gait, [place] Level minimum assist (75% patient effort)  -MS contact guard assist;2 person assist required  -MS     Gait, Assistive Device rolling walker  -MS rolling walker  -MS     Gait, Distance (Feet) 45  -MS 25  -MS     Gait, Gait Deviations right:;antalgic;nadir decreased;forward flexed posture  -MS right:;antalgic;nadir decreased;decreased heel strike;forward flexed posture;narrow base;step length decreased  -MS     Gait, Comment Verbal/tactile cues required for posture correction and for proper gait sequencing with use of walker.  -MS Pt. requires min. verbal/tactile cues for posture correction and proper gait sequencing with use of RWX.  x 2 standing rest breaks due to fatigue.  -MS     Recorded by [MS] Jose Ferguson PT [MS] Jose Ferguson PT     Therapy Exercises    Exercise Protocols total knee  -MS total knee  -MS     Total Knee  Exercises right:;20 reps;completed protocol  -MS right:;15 reps;completed protocol  -MS     Recorded by [MS] Jose Ferguson, PT [MS] Jose Ferguson, PT     Positioning and Restraints    Pre-Treatment Position sitting in chair/recliner  -MS sitting in chair/recliner  -MS     Post Treatment Position chair  -MS chair  -MS     In Chair notified nsg;reclined;sitting;call light within reach;encouraged to call for assist;with family/caregiver   Ice pack to Right knee.  -MS notified nsg;reclined;sitting;call light within reach;encouraged to call for assist;with family/caregiver   Ice pack to Right knee.  -MS     Recorded by [MS] Jose Ferguson, PT [MS] Jose Ferguson PT       User Key  (r) = Recorded By, (t) = Taken By, (c) = Cosigned By    Initials Name Effective Dates    MS Jose DELATORRE Marty, PT 12/01/15 -                 IP PT Goals       03/22/17 1001          Bed Mobility PT LTG    Bed Mobility PT LTG, Date Established 03/22/17  -MAY gramajo (r t))      Bed Mobility PT LTG, Time to Achieve 3 days  -MAY gramajo (r t))      Bed Mobility PT LTG, Activity Type all bed mobility  -MAY gramajo (r t))      Bed Mobility PT LTG, [place] Level conditional independence  -MAY gramajo (r t))      Transfer Training PT LTG    Transfer Training PT LTG, Date Established 03/22/17  -MAY gramajo (r t))      Transfer Training PT LTG, Time to Achieve 3 days  -MAY gramajo (r t))      Transfer Training PT LTG, Activity Type all transfers  -MAY gramajo (r t))      Transfer Training PT LTG, [place] Level supervision required  -MAY gramajo (r t))      Transfer Training PT LTG, Assist Device walker, rolling  -MAY gramajo (r t))      Gait Training PT LTG    Gait Training Goal PT LTG, Date Established 03/22/17  -MAY gramajo (r t))      Gait Training Goal PT LTG, Time to Achieve 3 days  -MAY Ash (r t)c)      Gait Training Goal PT LTG, [place] Level conditional independence  -MAY gramajo (r t))       Gait Training Goal PT LTG, Assist Device walker, rolling  -MAY (r) DR MAY gramajo (t))      Gait Training Goal PT LTG, Distance to Achieve 50 ft  -MAY (isrrael gramajo (t))      Range of Motion PT LTG    Range of Motion Goal PT LTG, Date Established 03/22/17  -MAY alvarenga) DR MAY gramajo (t))      Range of Motion Goal PT LTG, Time to Achieve 3 days  -MAY (sameer) DR MAY gramajo (t))      Range fo Motion Goal PT LTG, Joint R knee  -MAY (sameer) DR MAY gramajo (t))      Range of Motion Goal PT LTG, AROM Measure 5 to 90 degrees  -MAY alvarenga) DR MAY gramajo (t))      Patient Education PT LTG    Patient Education PT LTG, Date Established 03/22/17  -MAY alvarenga) DR MAY gramajo (t))      Patient Education PT LTG, Time to Achieve 3 days  -MAY gramajo (r t))      Patient Education PT LTG, Education Type HEP  -MAY (sameer) DR MAY gramajo (t))      Patient Education PT LTG, Education Understanding verbalize understanding  -MAY OLVERA (r t) (jenna)        User Key  (r) = Recorded By, (t) = Taken By, (c) = Cosigned By    Initials Name Provider Type    MAY Alberts, PT Physical Therapist    DR Sumeet Smith, PT Student PT Student          Physical Therapy Education     Title: PT OT SLP Therapies (Done)     Topic: Physical Therapy (Done)     Point: Mobility training (Done)    Learning Progress Summary    Learner Readiness Method Response Comment Documented by Status   Patient Acceptance E,D VU,NR  MS 03/23/17 1134 Done    Acceptance E,D VU,NR  MS 03/22/17 1552 Done    Acceptance E VU   03/22/17 0940 Done   Family Acceptance E VU   03/22/17 0940 Done               Point: Home exercise program (Done)    Learning Progress Summary    Learner Readiness Method Response Comment Documented by Status   Patient Acceptance E,D VU,NR  MS 03/23/17 1134 Done    Acceptance E,D VU,NR  MS 03/22/17 1552 Done    Acceptance E VU   03/22/17 0940 Done   Family Acceptance E VU   03/22/17 0940 Done               Point: Body mechanics (Done)    Learning Progress Summary    Learner Readiness Method Response Comment  Documented by Status   Patient Acceptance ANOOP VIRAMONTES,NR  MS 03/23/17 1134 Done    Acceptance ANOOP VIRAMONTES,NR  MS 03/22/17 1552 Done    Acceptance E VU  DR 03/22/17 0940 Done   Family Acceptance E VU  DR 03/22/17 0940 Done               Point: Precautions (Done)    Learning Progress Summary    Learner Readiness Method Response Comment Documented by Status   Patient Acceptance ANOOP VIRAMONTESNR  MS 03/23/17 1134 Done    Acceptance ANOOP VIRAMONTESNR  MS 03/22/17 1552 Done    Acceptance E VU  DR 03/22/17 0940 Done   Family Acceptance E VU  DR 03/22/17 0940 Done                      User Key     Initials Effective Dates Name Provider Type Discipline    MS 12/01/15 -  Jose Ferguson, PT Physical Therapist PT     03/07/17 -  Sumeet Smith, PT Student PT Student PT                    PT Recommendation and Plan  Anticipated Equipment Needs At Discharge:  (none)  Anticipated Discharge Disposition: skilled nursing facility  Planned Therapy Interventions: bed mobility training, gait training, home exercise program, ROM (Range of Motion), strengthening  PT Frequency: 2 times/day  Plan of Care Review  Plan Of Care Reviewed With: patient  Progress: improving  Outcome Summary/Follow up Plan: Improved tolerance to functional activity this AM with an increase in gait distance and progression of ther. ex. protocol.           Outcome Measures       03/23/17 1100 03/22/17 1500 03/22/17 0900    How much help from another person do you currently need...    Turning from your back to your side while in flat bed without using bedrails? 4  -MS 4  -MS 4  -KH (r)  (izaiha) MAY (c)    Moving from lying on back to sitting on the side of a flat bed without bedrails? 3  -MS 3  -MS 3  -KH (r)  (t) MAY (c)    Moving to and from a bed to a chair (including a wheelchair)? 3  -MS 3  -MS 3  -KH (r)  (t) MAY (c)    Standing up from a chair using your arms (e.g., wheelchair, bedside chair)? 3  -MS 3  -MS 3  -KH (r)  (t) MAY (c)    Climbing 3-5 steps with a railing? 2  -MS 2  -MS 2   -MAY (sameer)  (t) MAY (jenna)    To walk in hospital room? 3  -MS 3  -MS 3  -MAY (sameer)  (t) MAY (jenna)    AM-PAC 6 Clicks Score 18  -MS 18  -MS 18  -MAY alvarenga)  (izaiah)    Functional Assessment    Outcome Measure Options AM-PAC 6 Clicks Basic Mobility (PT)  -MS AM-PAC 6 Clicks Basic Mobility (PT)  -MS AM-PAC 6 Clicks Basic Mobility (PT)  -MAY (sameer)  (t) MAY (jenna)      User Key  (r) = Recorded By, (t) = Taken By, (c) = Cosigned By    Initials Name Provider Type     Raina Alberts, PT Physical Therapist    MS Jose Ferguson, PT Physical Therapist    DR Sumeet Smith, PT Student PT Student           Time Calculation:         PT Charges       03/23/17 1135          Time Calculation    Start Time 1040  -MS      Stop Time 1123  -MS      Time Calculation (min) 43 min  -MS      PT Received On 03/23/17  -MS      PT - Next Appointment 03/23/17  -MS        User Key  (r) = Recorded By, (t) = Taken By, (c) = Cosigned By    Initials Name Provider Type    MS Jose Ferguson, PT Physical Therapist          Therapy Charges for Today     Code Description Service Date Service Provider Modifiers Qty    68705653281 HC PT THER PROC GROUP 3/22/2017 Jose Ferguson, PT GP 1    83386092140 HC PT THER PROC EA 15 MIN 3/22/2017 Jose Ferguson, PT GP 1    93225591672 HC PT THER PROC EA 15 MIN 3/23/2017 Jose Ferguson, PT GP 1    29910799333 HC PT THER PROC GROUP 3/23/2017 Jose Ferguson, PT GP 1          PT G-Codes  Outcome Measure Options: AM-PAC 6 Clicks Basic Mobility (PT)    Jose Ferguson, PT  3/23/2017

## 2017-03-23 NOTE — PLAN OF CARE
Problem: Patient Care Overview (Adult)  Goal: Plan of Care Review  Outcome: Ongoing (interventions implemented as appropriate)    03/23/17 0239   Coping/Psychosocial Response Interventions   Plan Of Care Reviewed With patient   Patient Care Overview   Progress improving   Outcome Evaluation   Outcome Summary/Follow up Plan minimal pain during shift; ambulating with assist x 1; voiding; vss; refused CPM during evening       Goal: Adult Individualization and Mutuality  Outcome: Ongoing (interventions implemented as appropriate)  Goal: Discharge Needs Assessment  Outcome: Ongoing (interventions implemented as appropriate)    Problem: Perioperative Period (Adult)  Goal: Signs and Symptoms of Listed Potential Problems Will be Absent or Manageable (Perioperative Period)  Outcome: Ongoing (interventions implemented as appropriate)    Problem: Fall Risk (Adult)  Goal: Absence of Falls  Outcome: Ongoing (interventions implemented as appropriate)    Problem: Knee Replacement, Total (Adult)  Goal: Signs and Symptoms of Listed Potential Problems Will be Absent or Manageable (Knee Replacement, Total)  Outcome: Ongoing (interventions implemented as appropriate)

## 2017-03-23 NOTE — PROGRESS NOTES
Acute Care - Physical Therapy Treatment Note  [facility]     Patient Name: Rebecca Peterson  : 1931  MRN: [id number]  Today's Date: 3/23/2017  Onset of Illness/Injury or Date of Surgery Date: 17     Referring Physician: Dr. Eleazar Head    Admit Date: 3/21/2017    Visit Dx:    ICD-10-CM ICD-9-CM   1. Difficulty walking R26.2 719.7   2. Primary osteoarthritis of right knee M17.11 715.16     Patient Active Problem List   Diagnosis   • Primary osteoarthritis of right knee   • Hypertension   • Type 2 diabetes mellitus   • Cardiac murmur   • Dependent edema   • Postoperative anemia due to acute blood loss               Adult Rehabilitation Note       17 1522 17 1132 17 1548    Rehab Assessment/Intervention    Discipline physical therapist  -MS physical therapist  -MS physical therapist  -MS    Document Type therapy note (daily note)  -MS therapy note (daily note)  -MS therapy note (daily note)  -MS    Subjective Information agree to therapy;complains of;fatigue;pain  -MS agree to therapy;complains of;fatigue;pain  -MS agree to therapy;complains of;weakness;fatigue;pain  -MS    Patient Effort, Rehab Treatment good  -MS good  -MS good  -MS    Symptoms Noted Comment Pt. reports feeling fatigue but overall does not feel dizzy, lightheaded despite low Hgb.  -MS  Pt. reports pain/soreness in her Right knee this PM as well as feeling tired during ambulation.  Otherwise, pt. agreeable to all P.T.  -MS    Precautions/Limitations fall precautions  -MS fall precautions  -MS fall precautions  -MS    Recorded by [MS] Jose Ferguson, PT [MS] Jose Ferguson, PT [MS] Jose Ferguson, PT    Pain Assessment    Pain Assessment 0-10  -MS 0-10  -MS 0-10  -MS    Pain Score 3  -MS 3  -MS 5  -MS    Post Pain Score 3  -MS 3  -MS 5  -MS    Pain Type Acute pain;Surgical pain  -MS Acute pain;Surgical pain  -MS Acute pain;Surgical pain  -MS    Pain Location Knee  -MS Knee  -MS Knee  -MS    Pain Orientation Right   -MS Right  -MS Right  -MS    Pain Intervention(s)   Medication (See MAR);Cold applied;Repositioned;Elevated;Rest  -MS    Recorded by [MS] Jose Ferguson PT [MS] Jsoe Ferguson PT [MS] Jose Ferguson PT    Cognitive Assessment/Intervention    Current Cognitive/Communication Assessment functional  -MS functional  -MS     Orientation Status oriented x 4  -MS oriented x 4  -MS     Follows Commands/Answers Questions 100% of the time  -% of the time  -MS     Personal Safety WNL/WFL  -MS WNL/WFL  -MS     Personal Safety Interventions fall prevention program maintained;gait belt;supervised activity;nonskid shoes/slippers when out of bed  -MS fall prevention program maintained;gait belt;nonskid shoes/slippers when out of bed;supervised activity  -MS     Recorded by [MS] Jose Ferguson PT [MS] Jose Ferguson PT     ROM (Range of Motion)    General ROM Detail  Right knee AROM (-9, 76)  -MS Right Knee AROM (-10, 71)  -MS    Recorded by  [MS] Jose Ferguson PT [MS] Jose Ferguson PT    Bed Mobility, Assessment/Treatment    Bed Mobility, Comment Pt. up in chair this PM.  -MS Up in chair this AM.  -MS Pt. up in chair this PM.  -MS    Recorded by [MS] Jose Ferguson PT [MS] Jose Ferguson PT [MS] Jose Ferguson PT    Transfer Assessment/Treatment    Transfers, Sit-Stand [place] minimum assist (75% patient effort)  -MS contact guard assist  -MS minimum assist (75% patient effort)  -MS    Transfers, Stand-Sit [place] minimum assist (75% patient effort)  -MS contact guard assist  -MS minimum assist (75% patient effort)  -MS    Transfers, Sit-Stand-Sit, Assist Device rolling walker  -MS rolling walker  -MS rolling walker  -MS    Recorded by [MS] Jose Ferguson PT [MS] Jose Ferguson PT [MS] Jose Ferguson PT    Gait Assessment/Treatment    Gait, [place] Level contact guard assist  -MS minimum assist (75% patient effort)  -MS contact guard assist;2 person assist required  -MS     Gait, Assistive Device rolling walker  -MS rolling walker  -MS rolling walker  -MS    Gait, Distance (Feet) 60  -MS 45  -MS 25  -MS    Gait, Gait Deviations right:;antalgic;nadir decreased;decreased heel strike;forward flexed posture;step length decreased  -MS right:;antalgic;nadir decreased;forward flexed posture  -MS right:;antalgic;nadir decreased;decreased heel strike;forward flexed posture;narrow base;step length decreased  -MS    Gait, Comment Pt. requires verbal/tactile cues for posture correction, to increase her bilateral step length and heel strike, and for proper gait sequencing with use of RWX.  Pt. fatigues quickly with upright mobility.  -MS Verbal/tactile cues required for posture correction and for proper gait sequencing with use of walker.  -MS Pt. requires min. verbal/tactile cues for posture correction and proper gait sequencing with use of RWX.  x 2 standing rest breaks due to fatigue.  -MS    Recorded by [MS] Jose Ferguson PT [MS] Jose Ferguson PT [MS] Jose Ferguson PT    Therapy Exercises    Exercise Protocols total knee  -MS total knee  -MS total knee  -MS    Total Knee Exercises right:;25 reps;completed protocol  -MS right:;20 reps;completed protocol  -MS right:;15 reps;completed protocol  -MS    Recorded by [MS] Jose Ferguson PT [MS] Jose Ferguson PT [MS] Jose Ferguson PT    Positioning and Restraints    Pre-Treatment Position sitting in chair/recliner  -MS sitting in chair/recliner  -MS sitting in chair/recliner  -MS    Post Treatment Position chair  -MS chair  -MS chair  -MS    In Chair notified nsg;sitting;reclined;call light within reach;encouraged to call for assist;with family/caregiver   Ice pack to Right knee.  -MS notified nsg;reclined;sitting;call light within reach;encouraged to call for assist;with family/caregiver   Ice pack to Right knee.  -MS notified nsg;reclined;sitting;call light within reach;encouraged to call for assist;with  family/caregiver   Ice pack to Right knee.  -MS    Recorded by [MS] Jose JUAN RAMON Marty, PT [MS] Jose JUAN RAMON Ferguson, PT [MS] Jose DELATORRE Marty, PT      User Key  (r) = Recorded By, (t) = Taken By, (c) = Cosigned By    Initials Name Effective Dates    MS Jose Ferguson, PT 12/01/15 -                 IP PT Goals       03/22/17 1001          Bed Mobility PT LTG    Bed Mobility PT LTG, Date Established 03/22/17  -MAY (isrrael gramajo (t))      Bed Mobility PT LTG, Time to Achieve 3 days  -MAY (sameer) DR MAY (t) (jenna)      Bed Mobility PT LTG, Activity Type all bed mobility  -MAY (isrrael gramajo (t))      Bed Mobility PT LTG, [place] Level conditional independence  -MAY gramajo (r t))      Transfer Training PT LTG    Transfer Training PT LTG, Date Established 03/22/17  -MAY (isrrael gramajo (t))      Transfer Training PT LTG, Time to Achieve 3 days  -MAY (isrrael gramajo (t))      Transfer Training PT LTG, Activity Type all transfers  -MAY (sameer) DR MAY (t) (jenna)      Transfer Training PT LTG, [place] Level supervision required  -MAY gramajo (r t))      Transfer Training PT LTG, Assist Device walker, rolling  -MAY gramajo (r t))      Gait Training PT LTG    Gait Training Goal PT LTG, Date Established 03/22/17  -MAY (isrrael gramajo (t))      Gait Training Goal PT LTG, Time to Achieve 3 days  -MAY (isrrale gramajo (t))      Gait Training Goal PT LTG, [place] Level conditional independence  -MAY gramajo (r t))      Gait Training Goal PT LTG, Assist Device walker, rolling  -MAY gramajo (r t))      Gait Training Goal PT LTG, Distance to Achieve 50 ft  -MAY (isrrael gramajo (t))      Range of Motion PT LTG    Range of Motion Goal PT LTG, Date Established 03/22/17  -MAY (isrrael OLVERA (t) (jenna)      Range of Motion Goal PT LTG, Time to Achieve 3 days  -MAY PHIPPS (r) (t) MAY gramajo)      Range fo Motion Goal PT LTG, Joint R knee  -MAY gramajo (r t))      Range of Motion Goal PT LTG, AROM Measure 5 to 90 degrees  -MAY alvarenga) DR mortensen) MAY (jenna)      Patient Education PT LTG     Patient Education PT LTG, Date Established 03/22/17  -MAY alvarenga) DR MAY (t) (jenna)      Patient Education PT LTG, Time to Achieve 3 days  -MAY alvarenga) DR MAY gramajo (t))      Patient Education PT LTG, Education Type HEP  -MAY alvarenga) DR MAY gramajo (t))      Patient Education PT LTG, Education Understanding verbalize understanding  -MAY alvarenga) DR MAY (t) (jenna)        User Key  (r) = Recorded By, (t) = Taken By, (c) = Cosigned By    Initials Name Provider Type    MAY Alberts, PT Physical Therapist    DR Sumeet Smith, PT Student PT Student          Physical Therapy Education     Title: PT OT SLP Therapies (Done)     Topic: Physical Therapy (Done)     Point: Mobility training (Done)    Learning Progress Summary    Learner Readiness Method Response Comment Documented by Status   Patient Acceptance E,D VU,NR  MS 03/23/17 1525 Done    Acceptance E,D VU,NR  MS 03/23/17 1134 Done    Acceptance E,D VU,NR  MS 03/22/17 1552 Done    Acceptance E VU   03/22/17 0940 Done   Family Acceptance E VU   03/22/17 0940 Done               Point: Home exercise program (Done)    Learning Progress Summary    Learner Readiness Method Response Comment Documented by Status   Patient Acceptance E,D VU,NR  MS 03/23/17 1525 Done    Acceptance E,D VU,NR  MS 03/23/17 1134 Done    Acceptance E,D VU,NR  MS 03/22/17 1552 Done    Acceptance E VU   03/22/17 0940 Done   Family Acceptance E VU   03/22/17 0940 Done               Point: Body mechanics (Done)    Learning Progress Summary    Learner Readiness Method Response Comment Documented by Status   Patient Acceptance E,D VU,NR  MS 03/23/17 1525 Done    Acceptance E,D VU,NR  MS 03/23/17 1134 Done    Acceptance E,D VU,NR  MS 03/22/17 1552 Done    Acceptance E VU   03/22/17 0940 Done   Family Acceptance E VU   03/22/17 0940 Done               Point: Precautions (Done)    Learning Progress Summary    Learner Readiness Method Response Comment Documented by Status   Patient Acceptance E,D VU,NR  MS 03/23/17 1525 Done     Acceptance E,D EDIE,NR  MS 03/23/17 1134 Done    Acceptance E,D VU,NR  MS 03/22/17 1552 Done    Acceptance E EDIE PHIPPS 03/22/17 0940 Done   Family Acceptance E VU  DR 03/22/17 0940 Done                      User Key     Initials Effective Dates Name Provider Type Discipline    MS 12/01/15 -  Jose Ferguson, PT Physical Therapist PT     03/07/17 -  Sumeet Smith, PT Student PT Student PT                    PT Recommendation and Plan  Anticipated Equipment Needs At Discharge:  (none)  Anticipated Discharge Disposition: skilled nursing facility  Planned Therapy Interventions: bed mobility training, gait training, home exercise program, ROM (Range of Motion), strengthening  PT Frequency: 2 times/day  Plan of Care Review  Plan Of Care Reviewed With: patient, family  Progress: improving  Outcome Summary/Follow up Plan: Despite continued soreness and low Hgb this day, pt. able to increase her gait distance (60 feet) and progress in ther. ex. protocol.  Pt. still requires verbal/tactile cues for posture correction, RWX guidance, to increase her bilateral step length and heel strike, and for proper gait sequencing with use of RWX.          Outcome Measures       03/23/17 1500 03/23/17 1100 03/22/17 1500    How much help from another person do you currently need...    Turning from your back to your side while in flat bed without using bedrails? 4  -MS 4  -MS 4  -MS    Moving from lying on back to sitting on the side of a flat bed without bedrails? 3  -MS 3  -MS 3  -MS    Moving to and from a bed to a chair (including a wheelchair)? 3  -MS 3  -MS 3  -MS    Standing up from a chair using your arms (e.g., wheelchair, bedside chair)? 3  -MS 3  -MS 3  -MS    Climbing 3-5 steps with a railing? 2  -MS 2  -MS 2  -MS    To walk in hospital room? 3  -MS 3  -MS 3  -MS    AM-PAC 6 Clicks Score 18  -MS 18  -MS 18  -MS    Functional Assessment    Outcome Measure Options AM-PAC 6 Clicks Basic Mobility (PT)  -MS AM-PAC 6 Clicks Basic Mobility  (PT)  -MS AM-PAC 6 Clicks Basic Mobility (PT)  -MS      03/22/17 0900          How much help from another person do you currently need...    Turning from your back to your side while in flat bed without using bedrails? 4  -MAY (sameer) DR mortensen) MAY (jenna)      Moving from lying on back to sitting on the side of a flat bed without bedrails? 3  -MAY (r) DR MAY gramajo (t))      Moving to and from a bed to a chair (including a wheelchair)? 3  -MAY (r) DR mortensen) MAY (jenna)      Standing up from a chair using your arms (e.g., wheelchair, bedside chair)? 3  -MAY (r) DR mortensen) MAY gramajo)      Climbing 3-5 steps with a railing? 2  -MAY (sameer) DR mortensen) MAY gramajo)      To walk in hospital room? 3  -MAY (sameer) DR mortensen) MAY (jenna)      AM-PAC 6 Clicks Score 18  -MAY (sameer)  (izaiah)      Functional Assessment    Outcome Measure Options AM-PAC 6 Clicks Basic Mobility (PT)  -MAY (sameer)  (izaiah) MAY (jenna)        User Key  (r) = Recorded By, (t) = Taken By, (c) = Cosigned By    Initials Name Provider Type    MAY Alberts, PT Physical Therapist    MS Jose Ferguson, PT Physical Therapist    DR Sumeet Smith, PT Student PT Student           Time Calculation:         PT Charges       03/23/17 1526 03/23/17 1135       Time Calculation    Start Time 1401  -MS 1040  -MS     Stop Time 1434  -MS 1123  -MS     Time Calculation (min) 33 min  -MS 43 min  -MS     PT Received On 03/23/17  -MS 03/23/17  -MS     PT - Next Appointment 03/24/17  -MS 03/23/17  -MS       User Key  (r) = Recorded By, (t) = Taken By, (c) = Cosigned By    Initials Name Provider Type    MS Jose JUAN RAMON Ferguson, PT Physical Therapist          Therapy Charges for Today     Code Description Service Date Service Provider Modifiers Qty    43095932142 HC PT THER PROC GROUP 3/22/2017 Jose Ferguson, PT GP 1    93438601030 HC PT THER PROC EA 15 MIN 3/22/2017 Jose Ferguson, PT GP 1    [id number]  PT THER PROC EA 15 MIN 3/23/2017 Jose Ferguson, PT GP 1    [id number]  PT THER PROC GROUP 3/23/2017 Jose Ferguson, PT GP 1     93306992922 HC PT ROM CPM SUBSEQ 3/23/2017 Jose Ferguson, PT GP 1    45220775686 HC PT THER PROC EA 15 MIN 3/23/2017 Jose Ferguson, PT GP 1    22900170814 HC PT THER PROC GROUP 3/23/2017 Jose Ferguson, PT GP 1          PT G-Codes  Outcome Measure Options: AM-PAC 6 Clicks Basic Mobility (PT)    Jose Ferguson, PT  3/23/2017

## 2017-03-23 NOTE — PLAN OF CARE
Problem: Patient Care Overview (Adult)  Goal: Plan of Care Review  Outcome: Ongoing (interventions implemented as appropriate)    03/23/17 7155   Coping/Psychosocial Response Interventions   Plan Of Care Reviewed With patient   Patient Care Overview   Progress improving   Outcome Evaluation   Outcome Summary/Follow up Plan pain under control and ambulating well       Goal: Adult Individualization and Mutuality  Outcome: Ongoing (interventions implemented as appropriate)  Goal: Discharge Needs Assessment  Outcome: Ongoing (interventions implemented as appropriate)    Problem: Perioperative Period (Adult)  Goal: Signs and Symptoms of Listed Potential Problems Will be Absent or Manageable (Perioperative Period)  Outcome: Ongoing (interventions implemented as appropriate)    Problem: Fall Risk (Adult)  Goal: Absence of Falls  Outcome: Ongoing (interventions implemented as appropriate)    Problem: Knee Replacement, Total (Adult)  Goal: Signs and Symptoms of Listed Potential Problems Will be Absent or Manageable (Knee Replacement, Total)  Outcome: Ongoing (interventions implemented as appropriate)

## 2017-03-23 NOTE — PLAN OF CARE
Problem: Patient Care Overview (Adult)  Goal: Plan of Care Review    03/23/17 1134   Coping/Psychosocial Response Interventions   Plan Of Care Reviewed With patient   Patient Care Overview   Progress improving   Outcome Evaluation   Outcome Summary/Follow up Plan Improved tolerance to functional activity this AM with an increase in gait distance and progression of ther. ex. protocol.

## 2017-03-23 NOTE — PROGRESS NOTES
Continued Stay Note  [facility]     Patient Name: Rebecca Peterson  MRN: [id number]  Today's Date: 3/23/2017    Admit Date: 3/21/2017          Discharge Plan       03/23/17 1804    Case Management/Social Work Plan    Additional Comments Per Jan with [facility], pt will have skilled bed available Saturday 3/25/17. Nursing to call to see what time they are available to accept. Plan will be to d/c to [facility]  skilled.              Discharge Codes     None            Janiya Williamson RN

## 2017-03-23 NOTE — PROGRESS NOTES
"    Name: Rebecca Peterson ADMIT: 3/21/2017   : 1931  PCP: Pollo Dahl MD    MRN: [id number] LOS: 2 days   AGE/SEX: 85 y.o. female  ROOM: [facility]     Subjective   No new complaints.  Did well through the night.    Objective   Vital Signs  Temp:  [97.3 °F (36.3 °C)-98.3 °F (36.8 °C)] 98.3 °F (36.8 °C)  Heart Rate:  [76-87] 87  Resp:  [16-18] 16  BP: ()/(54-61) 112/54  SpO2:  [92 %-98 %] 94 %  on   ;   O2 Device: room air  Body mass index is 32.22 kg/(m^2).    Objective:  General Appearance:  Comfortable and in no acute distress.    Vital signs: (most recent): Blood pressure 112/54, pulse 87, temperature 98.3 °F (36.8 °C), temperature source Oral, resp. rate 16, height 60\" (152.4 cm), weight 165 lb (74.8 kg), SpO2 94 %.    Lungs:  Normal effort.  Breath sounds clear to auscultation.    Heart: Normal rate.  Regular rhythm.    Abdomen: Abdomen is soft and non-distended.  Bowel sounds are normal.   There is no abdominal tenderness.     Extremities: There is dependent edema (chronic).    Neurological: Patient is alert and oriented to person, place and time.    Skin:  Warm and dry.          Results Review:       I reviewed the patient's new clinical results.    Results from last 7 days  Lab Units 17  0406 178 17  1638   HEMOGLOBIN g/dL 7.6* 8.8* 10.2*       Results from last 7 days  Lab Units 17  0406   SODIUM mmol/L 134*   POTASSIUM mmol/L 4.3   CHLORIDE mmol/L 98   TOTAL CO2 mmol/L 23.9   BUN mg/dL 24*   CREATININE mg/dL 0.85   GLUCOSE mg/dL 153*   Estimated Creatinine Clearance: 43.7 mL/min (by C-G formula based on Cr of 0.85).    Results from last 7 days  Lab Units 17  0406   CALCIUM mg/dL 9.6       Results from last 7 days  Lab Units 17  0406 17  0458 17  1638   PROTIME Seconds 17.5* 14.5* 14.3*   INR  1.49* 1.17* 1.16*     Lab Results   Component Value Date    HGBA1C 6.36 (H) 2017     Glucose   Date/Time Value Ref Range Status   2017 2121 158 " (H) 70 - 130 mg/dL Final   03/22/2017 1652 196 (H) 70 - 130 mg/dL Final   03/22/2017 1151 165 (H) 70 - 130 mg/dL Final   03/22/2017 0615 183 (H) 70 - 130 mg/dL Final   03/21/2017 2117 264 (H) 70 - 130 mg/dL Final   03/21/2017 1627 209 (H) 70 - 130 mg/dL Final   03/21/2017 1054 129 70 - 130 mg/dL Final         atorvastatin 20 mg Oral Daily   insulin aspart 0-9 Units Subcutaneous 4x Daily AC & at Bedtime   losartan 100 mg Oral Daily With Lunch   metFORMIN 500 mg Oral Daily Before Supper   mupirocin  Each Nare BID   sennosides-docusate sodium 2 tablet Oral BID   warfarin 4 mg Oral Daily            Assessment/Plan   Assessment:     Active Hospital Problems (** Indicates Principal Problem)    Diagnosis Date Noted   • **Primary osteoarthritis of right knee [M17.11] 03/21/2017   • Hypertension [I10] 03/21/2017   • Type 2 diabetes mellitus [E11.9] 03/21/2017   • Cardiac murmur [R01.1] 03/21/2017   • Dependent edema [R60.9] 03/21/2017      Resolved Hospital Problems    Diagnosis Date Noted Date Resolved   No resolved problems to display.       Plan:   - POD#2 TOTAL KNEE ARTHROPLASTY with [facility]  - Continue patient's home oral hypoglycemic medications for diabetes.  - NOVOLOG - moderate dose correctional factor as needed.  - HgbA1C is good.  Continue home diabetic regimen post discharge.  - Holding parameters have been written for COZA[facility]. BP dropped to 98/58 after received yesterday.  Will cut dose down while here.  - Continue to hold hold MAXZIDE and NORVASC.  Patient reports pedal edema is chronic and has not been worsened since started on amlodipine last year.  - Hgb down 7.6 from 10.2.  A recheck was ordered for this afternoon.  Will follow up.  consider transfusion.  - SCDs and WARFARIN have been ordered for DVT prophylaxis per ortho. Monitor INR.  - Patient encouraged to use incentive spirometer as instructed.        Milind Martinez MD  [facility]  03/23/17  7:51 AM

## 2017-03-24 ENCOUNTER — APPOINTMENT (OUTPATIENT)
Dept: CARDIOLOGY | Facility: HOSPITAL | Age: 82
End: 2017-03-24
Attending: ORTHOPAEDIC SURGERY

## 2017-03-24 LAB
ABO + RH BLD: NORMAL
ABO + RH BLD: NORMAL
BASOPHILS # BLD AUTO: 0.02 10*3/MM3 (ref 0–0.2)
BASOPHILS NFR BLD AUTO: 0.1 % (ref 0–1.5)
BH BB BLOOD EXPIRATION DATE: NORMAL
BH BB BLOOD EXPIRATION DATE: NORMAL
BH BB BLOOD TYPE BARCODE: 5100
BH BB BLOOD TYPE BARCODE: 5100
BH BB DISPENSE STATUS: NORMAL
BH BB DISPENSE STATUS: NORMAL
BH BB PRODUCT CODE: NORMAL
BH BB PRODUCT CODE: NORMAL
BH BB UNIT NUMBER: NORMAL
BH BB UNIT NUMBER: NORMAL
BH CV LOWER VASCULAR LEFT COMMON FEMORAL AUGMENT: NORMAL
BH CV LOWER VASCULAR LEFT COMMON FEMORAL COMPETENT: NORMAL
BH CV LOWER VASCULAR LEFT COMMON FEMORAL COMPRESS: NORMAL
BH CV LOWER VASCULAR LEFT COMMON FEMORAL PHASIC: NORMAL
BH CV LOWER VASCULAR LEFT COMMON FEMORAL SPONT: NORMAL
BH CV LOWER VASCULAR RIGHT COMMON FEMORAL AUGMENT: NORMAL
BH CV LOWER VASCULAR RIGHT COMMON FEMORAL COMPETENT: NORMAL
BH CV LOWER VASCULAR RIGHT COMMON FEMORAL COMPRESS: NORMAL
BH CV LOWER VASCULAR RIGHT COMMON FEMORAL PHASIC: NORMAL
BH CV LOWER VASCULAR RIGHT COMMON FEMORAL SPONT: NORMAL
BH CV LOWER VASCULAR RIGHT DISTAL FEMORAL COMPRESS: NORMAL
BH CV LOWER VASCULAR RIGHT GASTRONEMIUS COMPRESS: NORMAL
BH CV LOWER VASCULAR RIGHT GREATER SAPH AK COMPRESS: NORMAL
BH CV LOWER VASCULAR RIGHT GREATER SAPH BK COMPRESS: NORMAL
BH CV LOWER VASCULAR RIGHT LESSER SAPH COMPRESS: NORMAL
BH CV LOWER VASCULAR RIGHT MID FEMORAL AUGMENT: NORMAL
BH CV LOWER VASCULAR RIGHT MID FEMORAL COMPETENT: NORMAL
BH CV LOWER VASCULAR RIGHT MID FEMORAL COMPRESS: NORMAL
BH CV LOWER VASCULAR RIGHT MID FEMORAL PHASIC: NORMAL
BH CV LOWER VASCULAR RIGHT MID FEMORAL SPONT: NORMAL
BH CV LOWER VASCULAR RIGHT PERONEAL COMPRESS: NORMAL
BH CV LOWER VASCULAR RIGHT POPLITEAL AUGMENT: NORMAL
BH CV LOWER VASCULAR RIGHT POPLITEAL COMPETENT: NORMAL
BH CV LOWER VASCULAR RIGHT POPLITEAL COMPRESS: NORMAL
BH CV LOWER VASCULAR RIGHT POPLITEAL PHASIC: NORMAL
BH CV LOWER VASCULAR RIGHT POPLITEAL SPONT: NORMAL
BH CV LOWER VASCULAR RIGHT POSTERIOR TIBIAL COMPRESS: NORMAL
BH CV LOWER VASCULAR RIGHT PROXIMAL FEMORAL COMPRESS: NORMAL
BH CV LOWER VASCULAR RIGHT SAPHENOFEMORAL JUNCTION COMPRESS: NORMAL
BH CV LOWER VASCULAR RIGHT SAPHENOFEMORAL JUNCTION PHASIC: NORMAL
BH CV LOWER VASCULAR RIGHT SAPHENOFEMORAL JUNCTION SPONT: NORMAL
DEPRECATED RDW RBC AUTO: 41.9 FL (ref 37–54)
EOSINOPHIL # BLD AUTO: 0.06 10*3/MM3 (ref 0–0.7)
EOSINOPHIL NFR BLD AUTO: 0.4 % (ref 0.3–6.2)
ERYTHROCYTE [DISTWIDTH] IN BLOOD BY AUTOMATED COUNT: 14.5 % (ref 11.7–13)
GLUCOSE BLDC GLUCOMTR-MCNC: 154 MG/DL (ref 70–130)
GLUCOSE BLDC GLUCOMTR-MCNC: 159 MG/DL (ref 70–130)
GLUCOSE BLDC GLUCOMTR-MCNC: 161 MG/DL (ref 70–130)
GLUCOSE BLDC GLUCOMTR-MCNC: 192 MG/DL (ref 70–130)
HCT VFR BLD AUTO: 30.9 % (ref 35.6–45.5)
HGB BLD-MCNC: 10.4 G/DL (ref 11.9–15.5)
IMM GRANULOCYTES # BLD: 0.04 10*3/MM3 (ref 0–0.03)
IMM GRANULOCYTES NFR BLD: 0.3 % (ref 0–0.5)
INR PPP: 1.43 (ref 0.9–1.1)
INR PPP: 1.62 (ref 0.9–1.1)
LYMPHOCYTES # BLD AUTO: 1.9 10*3/MM3 (ref 0.9–4.8)
LYMPHOCYTES NFR BLD AUTO: 14.1 % (ref 19.6–45.3)
MCH RBC QN AUTO: 26.8 PG (ref 26.9–32)
MCHC RBC AUTO-ENTMCNC: 33.7 G/DL (ref 32.4–36.3)
MCV RBC AUTO: 79.6 FL (ref 80.5–98.2)
MONOCYTES # BLD AUTO: 1.59 10*3/MM3 (ref 0.2–1.2)
MONOCYTES NFR BLD AUTO: 11.8 % (ref 5–12)
NEUTROPHILS # BLD AUTO: 9.89 10*3/MM3 (ref 1.9–8.1)
NEUTROPHILS NFR BLD AUTO: 73.3 % (ref 42.7–76)
NRBC BLD MANUAL-RTO: 0 /100 WBC (ref 0–0)
PLATELET # BLD AUTO: 197 10*3/MM3 (ref 140–500)
PMV BLD AUTO: 8.5 FL (ref 6–12)
PROTHROMBIN TIME: 16.9 SECONDS (ref 11.7–14.2)
PROTHROMBIN TIME: 18.6 SECONDS (ref 11.7–14.2)
RBC # BLD AUTO: 3.88 10*6/MM3 (ref 3.9–5.2)
UNIT  ABO: NORMAL
UNIT  ABO: NORMAL
UNIT  RH: NORMAL
UNIT  RH: NORMAL
WBC NRBC COR # BLD: 13.5 10*3/MM3 (ref 4.5–10.7)

## 2017-03-24 PROCEDURE — 93971 EXTREMITY STUDY: CPT

## 2017-03-24 PROCEDURE — 94799 UNLISTED PULMONARY SVC/PX: CPT

## 2017-03-24 PROCEDURE — 97150 GROUP THERAPEUTIC PROCEDURES: CPT

## 2017-03-24 PROCEDURE — 97110 THERAPEUTIC EXERCISES: CPT

## 2017-03-24 PROCEDURE — 85610 PROTHROMBIN TIME: CPT | Performed by: ORTHOPAEDIC SURGERY

## 2017-03-24 PROCEDURE — 63710000001 INSULIN ASPART PER 5 UNITS: Performed by: HOSPITALIST

## 2017-03-24 PROCEDURE — 85025 COMPLETE CBC W/AUTO DIFF WBC: CPT | Performed by: ORTHOPAEDIC SURGERY

## 2017-03-24 PROCEDURE — 82962 GLUCOSE BLOOD TEST: CPT

## 2017-03-24 RX ORDER — OXYCODONE AND ACETAMINOPHEN 7.5; 325 MG/1; MG/1
1 TABLET ORAL EVERY 4 HOURS PRN
Status: DISCONTINUED | OUTPATIENT
Start: 2017-03-24 | End: 2017-03-25 | Stop reason: HOSPADM

## 2017-03-24 RX ORDER — OXYCODONE AND ACETAMINOPHEN 7.5; 325 MG/1; MG/1
2 TABLET ORAL EVERY 4 HOURS PRN
Status: DISCONTINUED | OUTPATIENT
Start: 2017-03-24 | End: 2017-03-25 | Stop reason: HOSPADM

## 2017-03-24 RX ADMIN — INSULIN ASPART 2 UNITS: 100 INJECTION, SOLUTION INTRAVENOUS; SUBCUTANEOUS at 08:42

## 2017-03-24 RX ADMIN — INSULIN ASPART 2 UNITS: 100 INJECTION, SOLUTION INTRAVENOUS; SUBCUTANEOUS at 17:09

## 2017-03-24 RX ADMIN — WARFARIN SODIUM 4 MG: 4 TABLET ORAL at 17:09

## 2017-03-24 RX ADMIN — OXYCODONE HYDROCHLORIDE AND ACETAMINOPHEN 2 TABLET: 7.5; 325 TABLET ORAL at 08:43

## 2017-03-24 RX ADMIN — OXYCODONE HYDROCHLORIDE AND ACETAMINOPHEN 2 TABLET: 7.5; 325 TABLET ORAL at 17:09

## 2017-03-24 RX ADMIN — METFORMIN HYDROCHLORIDE 500 MG: 500 TABLET ORAL at 20:21

## 2017-03-24 RX ADMIN — INSULIN ASPART 2 UNITS: 100 INJECTION, SOLUTION INTRAVENOUS; SUBCUTANEOUS at 11:49

## 2017-03-24 RX ADMIN — ATORVASTATIN CALCIUM 20 MG: 20 TABLET, FILM COATED ORAL at 08:43

## 2017-03-24 RX ADMIN — OXYCODONE HYDROCHLORIDE AND ACETAMINOPHEN 2 TABLET: 7.5; 325 TABLET ORAL at 12:41

## 2017-03-24 NOTE — PROGRESS NOTES
Orthopedic Progress Note      Patient: Rebecca Peterson    YOB: 1931    Medical Record Number: [id number]    Attending Physician: Eleazar Head MD    Date of Admission: 3/21/2017 10:14 AM    Status Post: DC TOTAL KNEE ARTHROPLASTY [70780] (TOTAL KNEE ARTHROPLASTY with Tania Navigation)    Post Operative Day Number: 3    Admitting Dx: Primary osteoarthritis of right knee [M17.11]  Primary osteoarthritis of right knee [M17.11]    Current Problem List:   Patient Active Problem List   Diagnosis   • Primary osteoarthritis of right knee   • Hypertension   • Type 2 diabetes mellitus   • Cardiac murmur   • Dependent edema   • Postoperative anemia due to acute blood loss         Past Medical History:   Diagnosis Date   • Acid reflux    • Cataract     BILATERAL   • Dependent edema    • Diabetes mellitus    • Hyperlipidemia    • Hypertension    • Osteoarthritis        Current Medications:  Scheduled Meds:  atorvastatin 20 mg Oral Daily   insulin aspart 0-9 Units Subcutaneous 4x Daily AC & at Bedtime   losartan 25 mg Oral Daily With Lunch   metFORMIN 500 mg Oral Daily Before Supper   sennosides-docusate sodium 2 tablet Oral BID   warfarin 4 mg Oral Daily     PRN Meds:.•  acetaminophen  •  acetaminophen  •  bisacodyl  •  bisacodyl  •  dextrose  •  dextrose  •  diphenhydrAMINE  •  docusate sodium  •  glucagon (human recombinant)  •  HYDROmorphone **AND** naloxone  •  magnesium hydroxide  •  melatonin  •  ondansetron **OR** ondansetron ODT **OR** ondansetron  •  ondansetron  •  oxyCODONE-acetaminophen  •  oxyCODONE-acetaminophen  •  pantoprazole    Diagnostic Tests:    Lab Results (last 24 hours)     Procedure Component Value Units Date/Time    POC Glucose Fingerstick [[id number]]  (Abnormal) Collected:  03/23/17 1129    Specimen:  Blood Updated:  03/23/17 1131     Glucose 168 (H) mg/dL     Narrative:       Meter: [id number] : [id number] Kumar Mata    Hemoglobin & Hematocrit, Blood [[id number]]  (Abnormal)  Collected:  03/23/17 1214    Specimen:  Blood Updated:  03/23/17 1227     Hemoglobin 7.8 (L) g/dL      Hematocrit 23.5 (L) %     POC Glucose Fingerstick [[id number]]  (Abnormal) Collected:  03/23/17 1659    Specimen:  Blood Updated:  03/23/17 1701     Glucose 165 (H) mg/dL     Narrative:       Meter: [id number] : [id number] [facility]    POC Glucose Fingerstick [[id number]]  (Abnormal) Collected:  03/23/17 2249    Specimen:  Blood Updated:  03/23/17 2253     Glucose 131 (H) mg/dL     Narrative:       Meter: [id number] : [id number] Cullen BEEBE    CBC & Differential [[id number]] Collected:  03/24/17 0423    Specimen:  Blood Updated:  03/24/17 0446    Narrative:       The following orders were created for panel order CBC & Differential.  Procedure                               Abnormality         Status                     ---------                               -----------         ------                     CBC Auto Differential[[id number]]         Abnormal            Final result                 Please view results for these tests on the individual orders.    CBC Auto Differential [[id number]]  (Abnormal) Collected:  03/24/17 0423    Specimen:  Blood Updated:  03/24/17 0446     WBC 13.50 (H) 10*3/mm3      RBC 3.88 (L) 10*6/mm3      Hemoglobin 10.4 (L) g/dL      Hematocrit 30.9 (L) %      MCV 79.6 (L) fL      MCH 26.8 (L) pg      MCHC 33.7 g/dL      RDW 14.5 (H) %      RDW-SD 41.9 fl      MPV 8.5 fL      Platelets 197 10*3/mm3      Neutrophil % 73.3 %      Lymphocyte % 14.1 (L) %      Monocyte % 11.8 %      Eosinophil % 0.4 %      Basophil % 0.1 %      Immature Grans % 0.3 %      Neutrophils, Absolute 9.89 (H) 10*3/mm3      Lymphocytes, Absolute 1.90 10*3/mm3      Monocytes, Absolute 1.59 (H) 10*3/mm3      Eosinophils, Absolute 0.06 10*3/mm3      Basophils, Absolute 0.02 10*3/mm3      Immature Grans, Absolute 0.04 (H) 10*3/mm3      nRBC 0.0 /100 WBC     Narrative:       Post transfusion specimen    Protime-INR  [[id number]]  (Abnormal) Collected:  03/24/17 0423    Specimen:  Blood Updated:  03/24/17 0455     Protime 16.9 (H) Seconds      INR 1.43 (H)    POC Glucose Fingerstick [[id number]]  (Abnormal) Collected:  03/24/17 0613    Specimen:  Blood Updated:  03/24/17 0615     Glucose 161 (H) mg/dL     Narrative:       Meter: [id number] : [id number] Cullen BEEBE          Objective:  General Appearance:  85 y.o. female . Awake and alert. Ambulating well with PT.      Assessment:  Physical Exam:  Right knee dressing dry and intact.  Swelling unchanged.  Calf soft and nontender.  Good motion and sensation to right foot and ankle.  Hg much improved after blood transfusions.     Vitals:    03/24/17 0045 03/24/17 0300 03/24/17 0500 03/24/17 0719   BP: 131/61 150/70  137/65   BP Location:  Left arm  Left arm   Patient Position:  Sitting  Sitting   Pulse: 88 113 94 94   Resp: 16 16  16   Temp: 98.9 °F (37.2 °C) 98.8 °F (37.1 °C)  99.2 °F (37.3 °C)   TempSrc: Oral Oral  Oral   SpO2: 95% 96%  100%   Weight:       Height:           Plan:   Will check doppler to r/o DVT due to continued acute on chronic peripheral edema.   Continue Pain management efforts  Continue mobilization efforts  Continue incisional care  Continue DVT Prophylaxis    Discharge Plan:  Plan transfer to [facility] tomorrow for rehab.     Date: 3/24/2017    ROCAEL Dorsey MD

## 2017-03-24 NOTE — PROGRESS NOTES
"Acute Care - Physical Therapy Treatment Note  [facility]     Patient Name: Rebecca Peterson  : 1931  MRN: [id number]  Today's Date: 3/24/2017  Onset of Illness/Injury or Date of Surgery Date: 17     Referring Physician: Dr. Eleazar Head    Admit Date: 3/21/2017    Visit Dx:    ICD-10-CM ICD-9-CM   1. Difficulty walking R26.2 719.7   2. Primary osteoarthritis of right knee M17.11 715.16     Patient Active Problem List   Diagnosis   • Primary osteoarthritis of right knee   • Hypertension   • Type 2 diabetes mellitus   • Cardiac murmur   • Dependent edema   • Postoperative anemia due to acute blood loss               Adult Rehabilitation Note       17 1624 17 1118 17 1522    Rehab Assessment/Intervention    Discipline physical therapist  -MS physical therapist  -MS physical therapist  -MS    Document Type therapy note (daily note)  -MS therapy note (daily note)  -MS therapy note (daily note)  -MS    Subjective Information agree to therapy;complains of;fatigue;pain  -MS agree to therapy;complains of;pain  -MS agree to therapy;complains of;fatigue;pain  -MS    Patient Effort, Rehab Treatment good  -MS good  -MS good  -MS    Symptoms Noted Comment Pt. reports feeling \"alright\" this PM.  Awake, alert, and cooperative with P.T.  -MS Pt. reports feeling \"okay\" this AM with minimal c/o pain.   -MS Pt. reports feeling fatigue but overall does not feel dizzy, lightheaded despite low Hgb.  -MS    Precautions/Limitations fall precautions  -MS fall precautions  -MS fall precautions  -MS    Recorded by [MS] Jose Ferguson, PT [MS] Jose Ferguson, PT [MS] Jose Ferguson, PT    Pain Assessment    Pain Assessment 0-10  -MS 0-10  -MS 0-10  -MS    Pain Score 3  -MS 3  -MS 3  -MS    Post Pain Score 3  -MS 3  -MS 3  -MS    Pain Type Acute pain;Surgical pain  -MS Acute pain;Surgical pain  -MS Acute pain;Surgical pain  -MS    Pain Location Knee  -MS Knee  -MS Knee  -MS    Pain Orientation Right  -MS " Right  -MS Right  -MS    Recorded by [MS] Jose Ferguson PT [MS] Jose Ferguson PT [MS] Jose Ferguson PT    Cognitive Assessment/Intervention    Current Cognitive/Communication Assessment functional  -MS functional  -MS functional  -MS    Orientation Status oriented x 4  -MS oriented x 4  -MS oriented x 4  -MS    Follows Commands/Answers Questions 100% of the time  -% of the time  -% of the time  -MS    Personal Safety WNL/WFL  -MS WNL/WFL  -MS WNL/WFL  -MS    Personal Safety Interventions fall prevention program maintained;gait belt;nonskid shoes/slippers when out of bed;supervised activity  -MS fall prevention program maintained;gait belt;nonskid shoes/slippers when out of bed;supervised activity  -MS fall prevention program maintained;gait belt;supervised activity;nonskid shoes/slippers when out of bed  -MS    Recorded by [MS] Jose Ferguson PT [MS] Jose Ferguson PT [MS] Jose Ferguson PT    ROM (Range of Motion)    General ROM Detail  Right knee AROM (8, 79)  *8 degress from 0 degrees extension.  -MS     Recorded by  [MS] Jose Ferguson PT     Bed Mobility, Assessment/Treatment    Bed Mobility, Comment Pt. up in chair this PM for the gym.  -MS Pt. up in chair this AM for gym.  -MS Pt. up in chair this PM.  -MS    Recorded by [MS] Jose Ferguson PT [MS] Jose Ferguson PT [MS] Jose Ferguson PT    Transfer Assessment/Treatment    Transfers, Sit-Stand [place] minimum assist (75% patient effort)  -MS minimum assist (75% patient effort)  -MS minimum assist (75% patient effort)  -MS    Transfers, Stand-Sit [place] minimum assist (75% patient effort)  -MS minimum assist (75% patient effort)  -MS minimum assist (75% patient effort)  -MS    Transfers, Sit-Stand-Sit, Assist Device rolling walker  -MS rolling walker  -MS rolling walker  -MS    Recorded by [MS] Jose Ferguson PT [MS] Jose Ferguson PT [MS] Jose Ferguson PT    Gait Assessment/Treatment    Gait,  [place] Level contact guard assist  -MS contact guard assist;2 person assist required  -MS contact guard assist  -MS    Gait, Assistive Device rolling walker  -MS rolling walker  -MS rolling walker  -MS    Gait, Distance (Feet) 100  -MS 85  -MS 60  -MS    Gait, Gait Deviations right:;antalgic;nadir decreased;decreased heel strike;forward flexed posture;narrow base;step length decreased  -MS right:;antalgic;nadir decreased;decreased heel strike;forward flexed posture;step length decreased  -MS right:;antalgic;nadir decreased;decreased heel strike;forward flexed posture;step length decreased  -MS    Gait, Comment Pt. requires continued verbal/tactile cues for posture correction, proper gait sequencing with use of RWX, and to increase her bilateral step length as well as heel strike.  -MS Pt. requires verbal/tactile cues for posture correction and to increase her bilateral step length as well as heel strike.  x 2 standing rest breaks.  Pt. also able to initiate reciprocating gait pattern during ambulation this AM.  -MS Pt. requires verbal/tactile cues for posture correction, to increase her bilateral step length and heel strike, and for proper gait sequencing with use of RWX.  Pt. fatigues quickly with upright mobility.  -MS    Recorded by [MS] Jose Ferguson PT [MS] Jose Ferguson PT [MS] Jose Ferguson PT    Therapy Exercises    Exercise Protocols total knee  -MS total knee  -MS total knee  -MS    Total Knee Exercises right:;30 reps;completed protocol;with assist  -MS right:;30 reps;completed protocol;with assist  -MS right:;25 reps;completed protocol  -MS    Recorded by [MS] Jose Ferguson PT [MS] oJse Ferguson PT [MS] Jose Ferguson PT    Positioning and Restraints    Pre-Treatment Position sitting in chair/recliner  -MS sitting in chair/recliner  -MS sitting in chair/recliner  -MS    Post Treatment Position chair  -MS chair  -MS chair  -MS    In Chair notified  nsg;reclined;sitting;call light within reach;encouraged to call for assist;with family/caregiver   Ice pack to Right knee.  -MS notified nsg;reclined;sitting;call light within reach;encouraged to call for assist;with family/caregiver   Ice pack to Right knee.  -MS notified nsg;sitting;reclined;call light within reach;encouraged to call for assist;with family/caregiver   Ice pack to Right knee.  -MS    Recorded by [MS] Jose Ferguson PT [MS] Jose Ferguson PT [MS] Jose Ferguson PT      03/23/17 1132 03/22/17 1548       Rehab Assessment/Intervention    Discipline physical therapist  -MS physical therapist  -MS     Document Type therapy note (daily note)  -MS therapy note (daily note)  -MS     Subjective Information agree to therapy;complains of;fatigue;pain  -MS agree to therapy;complains of;weakness;fatigue;pain  -MS     Patient Effort, Rehab Treatment good  -MS good  -MS     Symptoms Noted Comment  Pt. reports pain/soreness in her Right knee this PM as well as feeling tired during ambulation.  Otherwise, pt. agreeable to all P.T.  -MS     Precautions/Limitations fall precautions  -MS fall precautions  -MS     Recorded by [MS] Jose Ferguson PT [MS] Jose Ferguson PT     Pain Assessment    Pain Assessment 0-10  -MS 0-10  -MS     Pain Score 3  -MS 5  -MS     Post Pain Score 3  -MS 5  -MS     Pain Type Acute pain;Surgical pain  -MS Acute pain;Surgical pain  -MS     Pain Location Knee  -MS Knee  -MS     Pain Orientation Right  -MS Right  -MS     Pain Intervention(s)  Medication (See MAR);Cold applied;Repositioned;Elevated;Rest  -MS     Recorded by [MS] Jose Ferguson PT [MS] Jose Ferugson PT     Cognitive Assessment/Intervention    Current Cognitive/Communication Assessment functional  -MS      Orientation Status oriented x 4  -MS      Follows Commands/Answers Questions 100% of the time  -MS      Personal Safety WNL/WFL  -MS      Personal Safety Interventions fall prevention program  maintained;gait belt;nonskid shoes/slippers when out of bed;supervised activity  -MS      Recorded by [MS] Jose Ferguson PT      ROM (Range of Motion)    General ROM Detail Right knee AROM (-9, 76)  -MS Right Knee AROM (-10, 71)  -MS     Recorded by [MS] Jose Ferguson PT [MS] Jose Ferguson PT     Bed Mobility, Assessment/Treatment    Bed Mobility, Comment Up in chair this AM.  -MS Pt. up in chair this PM.  -MS     Recorded by [MS] Jose Ferguson PT [MS] Jose Ferguson PT     Transfer Assessment/Treatment    Transfers, Sit-Stand [place] contact guard assist  -MS minimum assist (75% patient effort)  -MS     Transfers, Stand-Sit [place] contact guard assist  -MS minimum assist (75% patient effort)  -MS     Transfers, Sit-Stand-Sit, Assist Device rolling walker  -MS rolling walker  -MS     Recorded by [MS] Jose Ferguson PT [MS] Jose Ferguson PT     Gait Assessment/Treatment    Gait, [place] Level minimum assist (75% patient effort)  -MS contact guard assist;2 person assist required  -MS     Gait, Assistive Device rolling walker  -MS rolling walker  -MS     Gait, Distance (Feet) 45  -MS 25  -MS     Gait, Gait Deviations right:;antalgic;nadir decreased;forward flexed posture  -MS right:;antalgic;nadir decreased;decreased heel strike;forward flexed posture;narrow base;step length decreased  -MS     Gait, Comment Verbal/tactile cues required for posture correction and for proper gait sequencing with use of walker.  -MS Pt. requires min. verbal/tactile cues for posture correction and proper gait sequencing with use of RWX.  x 2 standing rest breaks due to fatigue.  -MS     Recorded by [MS] Jose Ferguson PT [MS] Jose Ferguson PT     Therapy Exercises    Exercise Protocols total knee  -MS total knee  -MS     Total Knee Exercises right:;20 reps;completed protocol  -MS right:;15 reps;completed protocol  -MS     Recorded by [MS] Jose Ferguson PT [MS] Jose Ferguson PT      Positioning and Restraints    Pre-Treatment Position sitting in chair/recliner  -MS sitting in chair/recliner  -MS     Post Treatment Position chair  -MS chair  -MS     In Chair notified nsg;reclined;sitting;call light within reach;encouraged to call for assist;with family/caregiver   Ice pack to Right knee.  -MS notified nsg;reclined;sitting;call light within reach;encouraged to call for assist;with family/caregiver   Ice pack to Right knee.  -MS     Recorded by [MS] Jose Ferguson, PT [MS] Jose Ferguson, PT       User Key  (r) = Recorded By, (t) = Taken By, (c) = Cosigned By    Initials Name Effective Dates    MS Jose DELATORRE Ferguson, PT 12/01/15 -                 IP PT Goals       03/22/17 1001          Bed Mobility PT LTG    Bed Mobility PT LTG, Date Established 03/22/17  -MAY gramajo (r t))      Bed Mobility PT LTG, Time to Achieve 3 days  -MAY gramajo (r t))      Bed Mobility PT LTG, Activity Type all bed mobility  -MAY (isrrael gramajo (t))      Bed Mobility PT LTG, [place] Level conditional independence  -MAY gramajo (r t))      Transfer Training PT LTG    Transfer Training PT LTG, Date Established 03/22/17  -MAY OLVERA (r t) (jenna)      Transfer Training PT LTG, Time to Achieve 3 days  -MAY gramajo (r t))      Transfer Training PT LTG, Activity Type all transfers  -MAY (isrrael gramajo (t))      Transfer Training PT LTG, [place] Level supervision required  -MAY gramajo (r t))      Transfer Training PT LTG, Assist Device walker, rolling  -MAY gramajo (r t))      Gait Training PT LTG    Gait Training Goal PT LTG, Date Established 03/22/17  -MAY gramajo (r t))      Gait Training Goal PT LTG, Time to Achieve 3 days  -MAY gramajo (r t))      Gait Training Goal PT LTG, [place] Level conditional independence  -MAY gramajo (r t))      Gait Training Goal PT LTG, Assist Device walker, rolling  -MAY PHIPPS (r t) KH (c)      Gait Training Goal PT LTG, Distance to Achieve 50 ft  -MAY alvarenga) DR mortensen) MAY  (c)      Range of Motion PT LTG    Range of Motion Goal PT LTG, Date Established 03/22/17  -MAY (r) DR MAY (t) (jenna)      Range of Motion Goal PT LTG, Time to Achieve 3 days  -MAY (r) DR MAY gramajo (t))      Range fo Motion Goal PT LTG, Joint R knee  -MAY (r) DR MAY (t) (jenna)      Range of Motion Goal PT LTG, AROM Measure 5 to 90 degrees  -MAY (r) DR MAY gramajo (t))      Patient Education PT LTG    Patient Education PT LTG, Date Established 03/22/17  -MAY (r) DR MAY gramajo (t))      Patient Education PT LTG, Time to Achieve 3 days  -MAY (r) DR MAY (t) (jenna)      Patient Education PT LTG, Education Type HEP  -MAY (sameer) DR MAY gramajo (t))      Patient Education PT LTG, Education Understanding verbalize understanding  -MAY (sameer) DR MAY (t) (jenna)        User Key  (r) = Recorded By, (t) = Taken By, (c) = Cosigned By    Initials Name Provider Type    MAY Alberts, PT Physical Therapist    DR Sumeet Smith, PT Student PT Student          Physical Therapy Education     Title: PT OT SLP Therapies (Done)     Topic: Physical Therapy (Done)     Point: Mobility training (Done)    Learning Progress Summary    Learner Readiness Method Response Comment Documented by Status   Patient Acceptance E,D VU,NR  MS 03/24/17 1626 Done    Acceptance E,D VU,NR  MS 03/24/17 1121 Done    Acceptance E,D VU,NR  MS 03/23/17 1525 Done    Acceptance E,D VU,NR  MS 03/23/17 1134 Done    Acceptance E,D VU,NR  MS 03/22/17 1552 Done    Acceptance E VU   03/22/17 0940 Done   Family Acceptance E VU   03/22/17 0940 Done               Point: Home exercise program (Done)    Learning Progress Summary    Learner Readiness Method Response Comment Documented by Status   Patient Acceptance E,D VU,NR  MS 03/24/17 1626 Done    Acceptance E,D VU,NR  MS 03/24/17 1121 Done    Acceptance E,D VU,NR  MS 03/23/17 1525 Done    Acceptance E,D VU,NR  MS 03/23/17 1134 Done    Acceptance E,D VU,NR  MS 03/22/17 1552 Done    Acceptance E EDIE PHIPPS 03/22/17 0940 Done   Family Acceptance E EDIE PHIPPS 03/22/17 0940  Done               Point: Body mechanics (Done)    Learning Progress Summary    Learner Readiness Method Response Comment Documented by Status   Patient Acceptance E,D VU,NR  MS 03/24/17 1626 Done    Acceptance E,D VU,NR  MS 03/24/17 1121 Done    Acceptance E,D VU,NR  MS 03/23/17 1525 Done    Acceptance E,D VU,NR  MS 03/23/17 1134 Done    Acceptance E,D VU,NR  MS 03/22/17 1552 Done    Acceptance E VU   03/22/17 0940 Done   Family Acceptance E VU   03/22/17 0940 Done               Point: Precautions (Done)    Learning Progress Summary    Learner Readiness Method Response Comment Documented by Status   Patient Acceptance E,D VU,NR  MS 03/24/17 1626 Done    Acceptance E,D VU,NR  MS 03/24/17 1121 Done    Acceptance E,D VU,NR  MS 03/23/17 1525 Done    Acceptance E,D VU,NR  MS 03/23/17 1134 Done    Acceptance E,D VU,NR  MS 03/22/17 1552 Done    Acceptance E VU   03/22/17 0940 Done   Family Acceptance E VU   03/22/17 0940 Done                      User Key     Initials Effective Dates Name Provider Type Discipline    MS 12/01/15 -  Jose Ferguson, PT Physical Therapist PT     03/07/17 -  Sumeet Smith, PT Student PT Student PT                    PT Recommendation and Plan  Anticipated Equipment Needs At Discharge:  (none)  Anticipated Discharge Disposition: skilled nursing facility  Planned Therapy Interventions: bed mobility training, gait training, home exercise program, ROM (Range of Motion), strengthening  PT Frequency: 2 times/day  Plan of Care Review  Plan Of Care Reviewed With: patient  Progress: progress towards functional goals is fair  Outcome Summary/Follow up Plan: Pt. about to progress in gait distance (100 feet) as well as ther. ex. protocol this PM while in the gym.  Pt. still requires verbal/tactile cues for proper gait sequencing, posture correction, and to increase her bilateral step length as well as heel strike during gait cycle however.          Outcome Measures       03/24/17 1600 03/24/17  1100 03/23/17 1500    How much help from another person do you currently need...    Turning from your back to your side while in flat bed without using bedrails? 4  -MS 4  -MS 4  -MS    Moving from lying on back to sitting on the side of a flat bed without bedrails? 3  -MS 3  -MS 3  -MS    Moving to and from a bed to a chair (including a wheelchair)? 3  -MS 3  -MS 3  -MS    Standing up from a chair using your arms (e.g., wheelchair, bedside chair)? 3  -MS 3  -MS 3  -MS    Climbing 3-5 steps with a railing? 2  -MS 2  -MS 2  -MS    To walk in hospital room? 3  -MS 3  -MS 3  -MS    AM-PAC 6 Clicks Score 18  -MS 18  -MS 18  -MS    Functional Assessment    Outcome Measure Options AM-PAC 6 Clicks Basic Mobility (PT)  -MS AM-PAC 6 Clicks Basic Mobility (PT)  -MS AM-PAC 6 Clicks Basic Mobility (PT)  -MS      03/23/17 1100 03/22/17 1500 03/22/17 0900    How much help from another person do you currently need...    Turning from your back to your side while in flat bed without using bedrails? 4  -MS 4  -MS 4  -KH (r) DR (t) MAY (c)    Moving from lying on back to sitting on the side of a flat bed without bedrails? 3  -MS 3  -MS 3  -KH (r) DR (t) KH (c)    Moving to and from a bed to a chair (including a wheelchair)? 3  -MS 3  -MS 3  -KH (r) DR (t) MAY (c)    Standing up from a chair using your arms (e.g., wheelchair, bedside chair)? 3  -MS 3  -MS 3  -KH (r) DR (t) KH (c)    Climbing 3-5 steps with a railing? 2  -MS 2  -MS 2  -KH (r) DR (t) KH (c)    To walk in hospital room? 3  -MS 3  -MS 3  -KH (r) DR (t) KH (c)    AM-PAC 6 Clicks Score 18  -MS 18  -MS 18  -KH (r) DR (t)    Functional Assessment    Outcome Measure Options AM-PAC 6 Clicks Basic Mobility (PT)  -MS AM-PAC 6 Clicks Basic Mobility (PT)  -MS AM-PAC 6 Clicks Basic Mobility (PT)  -MAY (r)  (t) MAY (c)      User Key  (r) = Recorded By, (t) = Taken By, (c) = Cosigned By    Initials Name Provider Type    MAY Alberts, PT Physical Therapist    MS Jose DELATORRE  Marty, PT Physical Therapist    DR Sumeet Smith, PT Student PT Student           Time Calculation:         PT Charges       03/24/17 1628 03/24/17 1122       Time Calculation    Start Time 1501  -MS 0930  -MS     Stop Time 1548  -MS 1020  -MS     Time Calculation (min) 47 min  -MS 50 min  -MS     PT Received On 03/24/17  -MS 03/24/17  -MS     PT - Next Appointment 03/25/17  -MS 03/24/17  -MS       User Key  (r) = Recorded By, (t) = Taken By, (c) = Cosigned By    Initials Name Provider Type    MS Jose Ferguson, PT Physical Therapist          Therapy Charges for Today     Code Description Service Date Service Provider Modifiers Qty    40297945828 HC PT THER PROC EA 15 MIN 3/23/2017 Jose Ferguson, PT GP 1    85259908900 HC PT THER PROC GROUP 3/23/2017 Jose Ferguson, PT GP 1    63335552848 HC PT ROM CPM SUBSEQ 3/23/2017 Jose Ferguson, PT GP 1    71829003779 HC PT THER PROC EA 15 MIN 3/23/2017 Jose Ferguson, PT GP 1    81689750167 HC PT THER PROC GROUP 3/23/2017 Jose Ferguson, PT GP 1    19847601868 HC PT THER PROC EA 15 MIN 3/24/2017 Jose Ferguson, PT GP 1    66673830468 HC PT THER PROC GROUP 3/24/2017 Jose Ferguson, PT GP 1    18406947647 HC PT ROM CPM SUBSEQ 3/24/2017 Jose Ferguson, PT GP 1    29874384475 HC PT THER PROC EA 15 MIN 3/24/2017 Jose Ferguson, PT GP 1    40191529303 HC PT THER PROC GROUP 3/24/2017 Jose Ferguson, PT GP 1          PT G-Codes  Outcome Measure Options: AM-PAC 6 Clicks Basic Mobility (PT)    Jose Ferguson, PT  3/24/2017

## 2017-03-24 NOTE — PLAN OF CARE
Problem: Patient Care Overview (Adult)  Goal: Plan of Care Review  Outcome: Ongoing (interventions implemented as appropriate)    03/24/17 0349   Coping/Psychosocial Response Interventions   Plan Of Care Reviewed With patient;family   Patient Care Overview   Progress progress toward functional goals as expected   Outcome Evaluation   Outcome Summary/Follow up Plan Pt is a post op day 2 of a rt total knee. Pt has been denying pain the entire shift. Pt has been up in the chair because it is more comfortable for her. Pt has been up to the bedside commode with an assist of 1. Daughter is at bedside. Pt is scheduled for discharge to [facility].       Goal: Adult Individualization and Mutuality  Outcome: Ongoing (interventions implemented as appropriate)  Goal: Discharge Needs Assessment  Outcome: Ongoing (interventions implemented as appropriate)    Problem: Perioperative Period (Adult)  Goal: Signs and Symptoms of Listed Potential Problems Will be Absent or Manageable (Perioperative Period)  Outcome: Ongoing (interventions implemented as appropriate)    Problem: Fall Risk (Adult)  Goal: Absence of Falls  Outcome: Ongoing (interventions implemented as appropriate)    Problem: Knee Replacement, Total (Adult)  Goal: Signs and Symptoms of Listed Potential Problems Will be Absent or Manageable (Knee Replacement, Total)  Outcome: Ongoing (interventions implemented as appropriate)

## 2017-03-24 NOTE — PROGRESS NOTES
Continued Stay Note  [facility]     Patient Name: Rebecca Peterson  MRN: [id number]  Today's Date: 3/24/2017    Admit Date: 3/21/2017          Discharge Plan       03/24/17 1403    Case Management/Social Work Plan    Plan [facility] via family transport    Patient/Family In Agreement With Plan yes    Additional Comments CCP met with pt and family to confirm d/c plan. Pt/family confirm plan to d/c to [facility] and states family will transport pt at d/c. CCP confirmed via Jan that a bed available for pt's anticipated d/c tomorrow, 3/25/17. Per Jan, nursing will need to call [facility] ([phone]) prior to pt leaving [facility] to ensure bed has been vacated. Pt to present to [facility] ER to admit to SNU. Pt's family informed and agreeable. Packet on pt chart. Yoly Lott LCSW              Discharge Codes     None            Yoly Lott LCSW

## 2017-03-24 NOTE — PLAN OF CARE
Problem: Patient Care Overview (Adult)  Goal: Plan of Care Review    03/24/17 5915   Coping/Psychosocial Response Interventions   Plan Of Care Reviewed With patient   Patient Care Overview   Progress progress towards functional goals is fair   Outcome Evaluation   Outcome Summary/Follow up Plan Pt. about to progress in gait distance (100 feet) as well as ther. ex. protocol this PM while in the gym. Pt. still requires verbal/tactile cues for proper gait sequencing, posture correction, and to increase her bilateral step length as well as heel strike during gait cycle however.

## 2017-03-24 NOTE — PLAN OF CARE
Problem: Patient Care Overview (Adult)  Goal: Plan of Care Review    03/24/17 1121   Coping/Psychosocial Response Interventions   Plan Of Care Reviewed With patient;family   Patient Care Overview   Progress progress towards functional goals is fair   Outcome Evaluation   Outcome Summary/Follow up Plan Improved tolerance to functional activity this AM with an increase in gait distance and progression of ther. ex. protocol. Pt. requires verbal/tactile cues for posture correction and to increase her bilateral step length as well as bilateral heel strike. Pt. able to initiate reciprocating gait pattern this AM with use of RWX.

## 2017-03-24 NOTE — PROGRESS NOTES
"Acute Care - Physical Therapy Treatment Note  [facility]     Patient Name: Rebecca Peterson  : 1931  MRN: [id number]  Today's Date: 3/24/2017  Onset of Illness/Injury or Date of Surgery Date: 17     Referring Physician: Dr. Eleazar Head    Admit Date: 3/21/2017    Visit Dx:    ICD-10-CM ICD-9-CM   1. Difficulty walking R26.2 719.7   2. Primary osteoarthritis of right knee M17.11 715.16     Patient Active Problem List   Diagnosis   • Primary osteoarthritis of right knee   • Hypertension   • Type 2 diabetes mellitus   • Cardiac murmur   • Dependent edema   • Postoperative anemia due to acute blood loss               Adult Rehabilitation Note       17 1118 17 1522 17 1132    Rehab Assessment/Intervention    Discipline physical therapist  -MS physical therapist  -MS physical therapist  -MS    Document Type therapy note (daily note)  -MS therapy note (daily note)  -MS therapy note (daily note)  -MS    Subjective Information agree to therapy;complains of;pain  -MS agree to therapy;complains of;fatigue;pain  -MS agree to therapy;complains of;fatigue;pain  -MS    Patient Effort, Rehab Treatment good  -MS good  -MS good  -MS    Symptoms Noted Comment Pt. reports feeling \"okay\" this AM with minimal c/o pain.   -MS Pt. reports feeling fatigue but overall does not feel dizzy, lightheaded despite low Hgb.  -MS     Precautions/Limitations fall precautions  -MS fall precautions  -MS fall precautions  -MS    Recorded by [MS] Jose Ferguson, PT [MS] Jose Ferguson, PT [MS] Jose Ferguson, PT    Pain Assessment    Pain Assessment 0-10  -MS 0-10  -MS 0-10  -MS    Pain Score 3  -MS 3  -MS 3  -MS    Post Pain Score 3  -MS 3  -MS 3  -MS    Pain Type Acute pain;Surgical pain  -MS Acute pain;Surgical pain  -MS Acute pain;Surgical pain  -MS    Pain Location Knee  -MS Knee  -MS Knee  -MS    Pain Orientation Right  -MS Right  -MS Right  -MS    Recorded by [MS] Jose Ferguson, PT [MS] Jose Ferguson, " PT [MS] Jose Ferguson PT    Cognitive Assessment/Intervention    Current Cognitive/Communication Assessment functional  -MS functional  -MS functional  -MS    Orientation Status oriented x 4  -MS oriented x 4  -MS oriented x 4  -MS    Follows Commands/Answers Questions 100% of the time  -% of the time  -% of the time  -MS    Personal Safety WNL/WFL  -MS WNL/WFL  -MS WNL/WFL  -MS    Personal Safety Interventions fall prevention program maintained;gait belt;nonskid shoes/slippers when out of bed;supervised activity  -MS fall prevention program maintained;gait belt;supervised activity;nonskid shoes/slippers when out of bed  -MS fall prevention program maintained;gait belt;nonskid shoes/slippers when out of bed;supervised activity  -MS    Recorded by [MS] Jose Ferguson PT [MS] Jose Ferguson PT [MS] Jose Ferguson PT    ROM (Range of Motion)    General ROM Detail Right knee AROM (8, 79)  *8 degress from 0 degrees extension.  -MS  Right knee AROM (-9, 76)  -MS    Recorded by [MS] Jose Ferguson PT  [MS] Jose Ferguson PT    Bed Mobility, Assessment/Treatment    Bed Mobility, Comment Pt. up in chair this AM for gym.  -MS Pt. up in chair this PM.  -MS Up in chair this AM.  -MS    Recorded by [MS] Jose Ferguson PT [MS] Jose Ferguson PT [MS] Jose Ferguson PT    Transfer Assessment/Treatment    Transfers, Sit-Stand [place] minimum assist (75% patient effort)  -MS minimum assist (75% patient effort)  -MS contact guard assist  -MS    Transfers, Stand-Sit [place] minimum assist (75% patient effort)  -MS minimum assist (75% patient effort)  -MS contact guard assist  -MS    Transfers, Sit-Stand-Sit, Assist Device rolling walker  -MS rolling walker  -MS rolling walker  -MS    Recorded by [MS] Jose Ferguson PT [MS] Jose Ferguson PT [MS] Jose Ferguson PT    Gait Assessment/Treatment    Gait, [place] Level contact guard assist;2 person assist required  -MS contact  guard assist  -MS minimum assist (75% patient effort)  -MS    Gait, Assistive Device rolling walker  -MS rolling walker  -MS rolling walker  -MS    Gait, Distance (Feet) 85  -MS 60  -MS 45  -MS    Gait, Gait Deviations right:;antalgic;nadir decreased;decreased heel strike;forward flexed posture;step length decreased  -MS right:;antalgic;nadir decreased;decreased heel strike;forward flexed posture;step length decreased  -MS right:;antalgic;nadir decreased;forward flexed posture  -MS    Gait, Comment Pt. requires verbal/tactile cues for posture correction and to increase her bilateral step length as well as heel strike.  x 2 standing rest breaks.  Pt. also able to initiate reciprocating gait pattern during ambulation this AM.  -MS Pt. requires verbal/tactile cues for posture correction, to increase her bilateral step length and heel strike, and for proper gait sequencing with use of RWX.  Pt. fatigues quickly with upright mobility.  -MS Verbal/tactile cues required for posture correction and for proper gait sequencing with use of walker.  -MS    Recorded by [MS] Jose Ferguson PT [MS] Jose Ferguson PT [MS] Jose Ferguson PT    Therapy Exercises    Exercise Protocols total knee  -MS total knee  -MS total knee  -MS    Total Knee Exercises right:;30 reps;completed protocol;with assist  -MS right:;25 reps;completed protocol  -MS right:;20 reps;completed protocol  -MS    Recorded by [MS] Jose Ferguson PT [MS] Jose Ferguson PT [MS] Jose Ferguson PT    Positioning and Restraints    Pre-Treatment Position sitting in chair/recliner  -MS sitting in chair/recliner  -MS sitting in chair/recliner  -MS    Post Treatment Position chair  -MS chair  -MS chair  -MS    In Chair notified nsg;reclined;sitting;call light within reach;encouraged to call for assist;with family/caregiver   Ice pack to Right knee.  -MS notified nsg;sitting;reclined;call light within reach;encouraged to call for assist;with  family/caregiver   Ice pack to Right knee.  -MS notified nsg;reclined;sitting;call light within reach;encouraged to call for assist;with family/caregiver   Ice pack to Right knee.  -MS    Recorded by [MS] Jose Ferguson, PT [MS] Jose Ferguson, PT [MS] Jose Ferguson, PT      03/22/17 2709          Rehab Assessment/Intervention    Discipline physical therapist  -MS      Document Type therapy note (daily note)  -MS      Subjective Information agree to therapy;complains of;weakness;fatigue;pain  -MS      Patient Effort, Rehab Treatment good  -MS      Symptoms Noted Comment Pt. reports pain/soreness in her Right knee this PM as well as feeling tired during ambulation.  Otherwise, pt. agreeable to all P.T.  -MS      Precautions/Limitations fall precautions  -MS      Recorded by [MS] Jose Ferguson PT      Pain Assessment    Pain Assessment 0-10  -MS      Pain Score 5  -MS      Post Pain Score 5  -MS      Pain Type Acute pain;Surgical pain  -MS      Pain Location Knee  -MS      Pain Orientation Right  -MS      Pain Intervention(s) Medication (See MAR);Cold applied;Repositioned;Elevated;Rest  -MS      Recorded by [MS] Jose Ferguson, PT      ROM (Range of Motion)    General ROM Detail Right Knee AROM (-10, 71)  -MS      Recorded by [MS] Jose Ferguson PT      Bed Mobility, Assessment/Treatment    Bed Mobility, Comment Pt. up in chair this PM.  -MS      Recorded by [MS] Jose Ferguson PT      Transfer Assessment/Treatment    Transfers, Sit-Stand [place] minimum assist (75% patient effort)  -MS      Transfers, Stand-Sit [place] minimum assist (75% patient effort)  -MS      Transfers, Sit-Stand-Sit, Assist Device rolling walker  -MS      Recorded by [MS] Jose Ferguson, PT      Gait Assessment/Treatment    Gait, [place] Level contact guard assist;2 person assist required  -MS      Gait, Assistive Device rolling walker  -MS      Gait, Distance (Feet) 25  -MS      Gait, Gait Deviations  right:;antalgic;nadir decreased;decreased heel strike;forward flexed posture;narrow base;step length decreased  -MS      Gait, Comment Pt. requires min. verbal/tactile cues for posture correction and proper gait sequencing with use of RWX.  x 2 standing rest breaks due to fatigue.  -MS      Recorded by [MS] Jose Ferguson, PT      Therapy Exercises    Exercise Protocols total knee  -MS      Total Knee Exercises right:;15 reps;completed protocol  -MS      Recorded by [MS] Jose Ferguson PT      Positioning and Restraints    Pre-Treatment Position sitting in chair/recliner  -MS      Post Treatment Position chair  -MS      In Chair notified nsg;reclined;sitting;call light within reach;encouraged to call for assist;with family/caregiver   Ice pack to Right knee.  -MS      Recorded by [MS] Jose Ferguson PT        User Key  (r) = Recorded By, (t) = Taken By, (c) = Cosigned By    Initials Name Effective Dates    MS Jose Ferguson, PT 12/01/15 -                 IP PT Goals       03/22/17 1001          Bed Mobility PT LTG    Bed Mobility PT LTG, Date Established 03/22/17  -MAY gramajo (r t))      Bed Mobility PT LTG, Time to Achieve 3 days  -MAY gramajo (r t))      Bed Mobility PT LTG, Activity Type all bed mobility  -MAY gramajo (r t))      Bed Mobility PT LTG, [place] Level conditional independence  -MAY gramajo (r t))      Transfer Training PT LTG    Transfer Training PT LTG, Date Established 03/22/17  -MAY gramajo (r t))      Transfer Training PT LTG, Time to Achieve 3 days  -MAY gramajo (r t))      Transfer Training PT LTG, Activity Type all transfers  -MAY gramajo (r t))      Transfer Training PT LTG, [place] Level supervision required  -MAY gramajo (r t))      Transfer Training PT LTG, Assist Device walker, rolling  -MAY gramajo (r t))      Gait Training PT LTG    Gait Training Goal PT LTG, Date Established 03/22/17  -MAY gramajo (r t))      Gait Training Goal PT LTG, Time to  Achieve 3 days  -MAY gramajo (r t))      Gait Training Goal PT LTG, [place] Level conditional independence  -AMY gramajo (r t))      Gait Training Goal PT LTG, Assist Device walker, rolling  -MAY gramajo (r t))      Gait Training Goal PT LTG, Distance to Achieve 50 ft  -MAY alvarenga) DR MAY gramajo (t))      Range of Motion PT LTG    Range of Motion Goal PT LTG, Date Established 03/22/17  -MAY gramajo (r t))      Range of Motion Goal PT LTG, Time to Achieve 3 days  -MAY (sameer) DR MAY gramajo (t))      Range fo Motion Goal PT LTG, Joint R knee  -MAY alvarenga) DR MAY gramajo (t))      Range of Motion Goal PT LTG, AROM Measure 5 to 90 degrees  -MAY alvarenga) DR MAY gramajo (t))      Patient Education PT LTG    Patient Education PT LTG, Date Established 03/22/17  -MAY gramajo (r t))      Patient Education PT LTG, Time to Achieve 3 days  -MAY gramajo (r t))      Patient Education PT LTG, Education Type HEP  -MAY (sameer) DR MAY gramajo (t))      Patient Education PT LTG, Education Understanding verbalize understanding  -MAY gramajo (r t))        User Key  (r) = Recorded By, (t) = Taken By, (c) = Cosigned By    Initials Name Provider Type    MAY Alberts, PT Physical Therapist    DR Sumeet Smith, PT Student PT Student          Physical Therapy Education     Title: PT OT SLP Therapies (Done)     Topic: Physical Therapy (Done)     Point: Mobility training (Done)    Learning Progress Summary    Learner Readiness Method Response Comment Documented by Status   Patient Acceptance E,D EUGENIO IRIZARRY  MS 03/24/17 1121 Done    Acceptance ED EUGENIO IRIZARRY  MS 03/23/17 1525 Done    Acceptance ANOOP VIRAMONTES NR  MS 03/23/17 1134 Done    Acceptance ED EDIENR  MS 03/22/17 1552 Done    Acceptance E EDIE PHIPPS 03/22/17 0940 Done   Family Acceptance E EDIE PHIPPS 03/22/17 0940 Done               Point: Home exercise program (Done)    Learning Progress Summary    Learner Readiness Method Response Comment Documented by Status   Patient Acceptance E,D EDIE,EUGENIO  MS 03/24/17 1121 Done    Acceptance ANOOP VIRAMONTESNR   MS 03/23/17 1525 Done    Acceptance E,D VU,NR  MS 03/23/17 1134 Done    Acceptance E,D VU,NR  MS 03/22/17 1552 Done    Acceptance E VU  DR 03/22/17 0940 Done   Family Acceptance E VU  DR 03/22/17 0940 Done               Point: Body mechanics (Done)    Learning Progress Summary    Learner Readiness Method Response Comment Documented by Status   Patient Acceptance E,D VU,NR  MS 03/24/17 1121 Done    Acceptance E,D VU,NR  MS 03/23/17 1525 Done    Acceptance E,D VU,NR  MS 03/23/17 1134 Done    Acceptance E,D VU,NR  MS 03/22/17 1552 Done    Acceptance E VU  DR 03/22/17 0940 Done   Family Acceptance E VU  DR 03/22/17 0940 Done               Point: Precautions (Done)    Learning Progress Summary    Learner Readiness Method Response Comment Documented by Status   Patient Acceptance E,D VU,NR  MS 03/24/17 1121 Done    Acceptance E,D VU,NR  MS 03/23/17 1525 Done    Acceptance E,D VU,NR  MS 03/23/17 1134 Done    Acceptance E,D VU,NR  MS 03/22/17 1552 Done    Acceptance E VU  DR 03/22/17 0940 Done   Family Acceptance E VU  DR 03/22/17 0940 Done                      User Key     Initials Effective Dates Name Provider Type Discipline    MS 12/01/15 -  Jose Ferguson, PT Physical Therapist PT    DR 03/07/17 -  Sumeet Smith, PT Student PT Student PT                    PT Recommendation and Plan  Anticipated Equipment Needs At Discharge:  (none)  Anticipated Discharge Disposition: skilled nursing facility  Planned Therapy Interventions: bed mobility training, gait training, home exercise program, ROM (Range of Motion), strengthening  PT Frequency: 2 times/day  Plan of Care Review  Plan Of Care Reviewed With: patient, family  Progress: progress towards functional goals is fair  Outcome Summary/Follow up Plan: Improved tolerance to functional activity this AM with an increase in gait distance and progression of ther. ex. protocol.  Pt. requires verbal/tactile cues for posture correction and to increase her bilateral step length as  well as bilateral heel strike.  Pt. able to initiate reciprocating gait pattern this AM with use of RWX.          Outcome Measures       03/24/17 1100 03/23/17 1500 03/23/17 1100    How much help from another person do you currently need...    Turning from your back to your side while in flat bed without using bedrails? 4  -MS 4  -MS 4  -MS    Moving from lying on back to sitting on the side of a flat bed without bedrails? 3  -MS 3  -MS 3  -MS    Moving to and from a bed to a chair (including a wheelchair)? 3  -MS 3  -MS 3  -MS    Standing up from a chair using your arms (e.g., wheelchair, bedside chair)? 3  -MS 3  -MS 3  -MS    Climbing 3-5 steps with a railing? 2  -MS 2  -MS 2  -MS    To walk in hospital room? 3  -MS 3  -MS 3  -MS    AM-PAC 6 Clicks Score 18  -MS 18  -MS 18  -MS    Functional Assessment    Outcome Measure Options AM-PAC 6 Clicks Basic Mobility (PT)  -MS AM-PAC 6 Clicks Basic Mobility (PT)  -MS AM-PAC 6 Clicks Basic Mobility (PT)  -MS      03/22/17 1500 03/22/17 0900       How much help from another person do you currently need...    Turning from your back to your side while in flat bed without using bedrails? 4  -MS 4  -KH (r)  (izaiah) MAY (c)     Moving from lying on back to sitting on the side of a flat bed without bedrails? 3  -MS 3  -KH (r)  (izaiah) MAY (c)     Moving to and from a bed to a chair (including a wheelchair)? 3  -MS 3  -KH (r) DR mortensen) MAY (jenna)     Standing up from a chair using your arms (e.g., wheelchair, bedside chair)? 3  -MS 3  -KH (r) DR mortensen) MAY (jenna)     Climbing 3-5 steps with a railing? 2  -MS 2  -KH (r)  (izaiah) MAY (jenna)     To walk in hospital room? 3  -MS 3  -KH (r)  (izaiah) MAY (c)     AM-PAC 6 Clicks Score 18  -MS 18  -KH (r)  (t)     Functional Assessment    Outcome Measure Options AM-PAC 6 Clicks Basic Mobility (PT)  -MS AM-PAC 6 Clicks Basic Mobility (PT)  -MAY (r)  (t) MAY (c)       User Key  (r) = Recorded By, (t) = Taken By, (c) = Cosigned By    Initials Name Provider Type    KH  Raina Alberts, PT Physical Therapist    MS Jose Ferguson, PT Physical Therapist    DR Sumeet Smith, PT Student PT Student           Time Calculation:         PT Charges       03/24/17 1122          Time Calculation    Start Time 0930  -MS      Stop Time 1020  -MS      Time Calculation (min) 50 min  -MS      PT Received On 03/24/17  -MS      PT - Next Appointment 03/24/17  -MS        User Key  (r) = Recorded By, (t) = Taken By, (c) = Cosigned By    Initials Name Provider Type    MS Jose Ferguson, PT Physical Therapist          Therapy Charges for Today     Code Description Service Date Service Provider Modifiers Qty    22840935178 HC PT THER PROC EA 15 MIN 3/23/2017 Jose Ferguson, PT GP 1    85024203655 HC PT THER PROC GROUP 3/23/2017 Jose Ferguson, PT GP 1    46563576350 HC PT ROM CPM SUBSEQ 3/23/2017 Jose Ferguson, PT GP 1    06328700215 HC PT THER PROC EA 15 MIN 3/23/2017 Jose Ferguson, PT GP 1    83804798188 HC PT THER PROC GROUP 3/23/2017 Jose Ferguson, PT GP 1    83802232781 HC PT THER PROC EA 15 MIN 3/24/2017 Jose Ferguson, PT GP 1    76417237852 HC PT THER PROC GROUP 3/24/2017 Jose Ferguson, PT GP 1          PT G-Codes  Outcome Measure Options: AM-PAC 6 Clicks Basic Mobility (PT)    Jose Ferguson, PT  3/24/2017

## 2017-03-24 NOTE — PROGRESS NOTES
" LOS: 3 days   Primary Care Physician: Pollo Dahl MD     Subjective  Doing well. Reports good pain control. No CP SOA NVD.    Vital Signs  Body mass index is 32.22 kg/(m^2).  Temp:  [97.7 °F (36.5 °C)-99.2 °F (37.3 °C)] 97.7 °F (36.5 °C)  Heart Rate:  [] 90  Resp:  [16-20] 16  BP: (107-150)/(49-70) 107/55      Objective:  General Appearance:  Comfortable and in no acute distress.    Vital signs: (most recent): Blood pressure 107/55, pulse 90, temperature 97.7 °F (36.5 °C), temperature source Oral, resp. rate 16, height 60\" (152.4 cm), weight 165 lb (74.8 kg), SpO2 97 %.  Vital signs are normal.    HEENT: Normal HEENT exam.    Lungs:  Normal respiratory rate and normal effort.  Breath sounds clear to auscultation.    Heart: Normal rate.  Regular rhythm.    Abdomen: Abdomen is soft.  There is no abdominal tenderness.     Extremities: There is dependent edema.    Pulses: Distal pulses are intact.    Neurological: Patient is alert.    Pupils:  Pupils are equal, round, and reactive to light.    Skin:  Warm and dry.                Results Review:    I reviewed the patient's new clinical results.      Results from last 7 days  Lab Units 03/24/17  0423 03/23/17  1214   WBC 10*3/mm3 13.50*  --    HEMOGLOBIN g/dL 10.4* 7.8*   PLATELETS 10*3/mm3 197  --        Results from last 7 days  Lab Units 03/23/17  0406   SODIUM mmol/L 134*   POTASSIUM mmol/L 4.3   CHLORIDE mmol/L 98   TOTAL CO2 mmol/L 23.9   BUN mg/dL 24*   CREATININE mg/dL 0.85   CALCIUM mg/dL 9.6   GLUCOSE mg/dL 153*       Results from last 7 days  Lab Units 03/24/17  1045   INR  1.62*     Hemoglobin A1C:  Lab Results   Component Value Date    HGBA1C 6.36 (H) 03/22/2017       Glucose Range:  Glucose   Date/Time Value Ref Range Status   03/24/2017 1127 192 (H) 70 - 130 mg/dL Final   03/24/2017 0613 161 (H) 70 - 130 mg/dL Final   03/23/2017 2249 131 (H) 70 - 130 mg/dL Final   03/23/2017 1659 165 (H) 70 - 130 mg/dL Final   03/23/2017 1129 168 (H) 70 - 130 mg/dL " Final   03/23/2017 0622 181 (H) 70 - 130 mg/dL Final       Medication Review: Yes    Physical Therapy:    Assessment/Plan     Active Hospital Problems (** Indicates Principal Problem)    Diagnosis Date Noted   • **Primary osteoarthritis of right knee [M17.11] 03/21/2017   • Postoperative anemia due to acute blood loss [D62] 03/23/2017   • Hypertension [I10] 03/21/2017   • Type 2 diabetes mellitus [E11.9] 03/21/2017   • Cardiac murmur [R01.1] 03/21/2017   • Dependent edema [R60.9] 03/21/2017      Resolved Hospital Problems    Diagnosis Date Noted Date Resolved   No resolved problems to display.       Assessment & Plan  -sp R TKA 03/21  -A1c 6.36. Renal function is stable. Can resume home regimen upon discharge. Will continue SSI while in house.  -BP acceptable on current regimen.  -Repeat Hgb back to baseline on 10.4. Coumadin per Ortho, INR 1.6.    Disposition:    Joshua Garcia MD  03/24/17  3:11 PM

## 2017-03-24 NOTE — PLAN OF CARE
Problem: Patient Care Overview (Adult)  Goal: Plan of Care Review  Outcome: Ongoing (interventions implemented as appropriate)    03/24/17 1121 03/24/17 1322   Coping/Psychosocial Response Interventions   Plan Of Care Reviewed With patient;family --    Patient Care Overview   Progress progress towards functional goals is fair --    Outcome Evaluation   Outcome Summary/Follow up Plan --  Pt ambulated with PT and staff. Pain controlled with PO meds. Dressing changed.       Goal: Adult Individualization and Mutuality  Outcome: Ongoing (interventions implemented as appropriate)  Goal: Discharge Needs Assessment  Outcome: Ongoing (interventions implemented as appropriate)    Problem: Perioperative Period (Adult)  Goal: Signs and Symptoms of Listed Potential Problems Will be Absent or Manageable (Perioperative Period)  Outcome: Outcome(s) achieved Date Met:  03/24/17    Problem: Fall Risk (Adult)  Goal: Absence of Falls  Outcome: Ongoing (interventions implemented as appropriate)    Problem: Knee Replacement, Total (Adult)  Goal: Signs and Symptoms of Listed Potential Problems Will be Absent or Manageable (Knee Replacement, Total)  Outcome: Ongoing (interventions implemented as appropriate)

## 2017-03-25 ENCOUNTER — HOSPITAL ENCOUNTER (INPATIENT)
Dept: HOSPITAL 49 - FSNU | Age: 82
LOS: 13 days | Discharge: HOME HEALTH | DRG: 560 | End: 2017-04-07
Admitting: HOSPITALIST
Payer: MEDICARE

## 2017-03-25 VITALS
BODY MASS INDEX: 32.39 KG/M2 | HEIGHT: 60 IN | SYSTOLIC BLOOD PRESSURE: 118 MMHG | TEMPERATURE: 97 F | RESPIRATION RATE: 16 BRPM | OXYGEN SATURATION: 94 % | WEIGHT: 165 LBS | HEART RATE: 78 BPM | DIASTOLIC BLOOD PRESSURE: 63 MMHG

## 2017-03-25 DIAGNOSIS — N18.9: ICD-10-CM

## 2017-03-25 DIAGNOSIS — E78.5: ICD-10-CM

## 2017-03-25 DIAGNOSIS — Z47.1: Primary | ICD-10-CM

## 2017-03-25 DIAGNOSIS — Z96.651: ICD-10-CM

## 2017-03-25 DIAGNOSIS — K21.9: ICD-10-CM

## 2017-03-25 DIAGNOSIS — Z87.891: ICD-10-CM

## 2017-03-25 DIAGNOSIS — I12.9: ICD-10-CM

## 2017-03-25 DIAGNOSIS — N17.9: ICD-10-CM

## 2017-03-25 DIAGNOSIS — Z79.84: ICD-10-CM

## 2017-03-25 DIAGNOSIS — E11.22: ICD-10-CM

## 2017-03-25 LAB
GLUCOSE BLDC GLUCOMTR-MCNC: 114 MG/DL (ref 70–130)
GLUCOSE BLDC GLUCOMTR-MCNC: 164 MG/DL (ref 70–130)
HCT VFR BLD AUTO: 29 % (ref 35.6–45.5)
HGB BLD-MCNC: 9.6 G/DL (ref 11.9–15.5)
INR PPP: 1.68 (ref 0.9–1.1)
PROTHROMBIN TIME: 19.2 SECONDS (ref 11.7–14.2)

## 2017-03-25 PROCEDURE — 85610 PROTHROMBIN TIME: CPT | Performed by: ORTHOPAEDIC SURGERY

## 2017-03-25 PROCEDURE — 99024 POSTOP FOLLOW-UP VISIT: CPT | Performed by: ORTHOPAEDIC SURGERY

## 2017-03-25 PROCEDURE — 85014 HEMATOCRIT: CPT | Performed by: ORTHOPAEDIC SURGERY

## 2017-03-25 PROCEDURE — 94799 UNLISTED PULMONARY SVC/PX: CPT

## 2017-03-25 PROCEDURE — 97150 GROUP THERAPEUTIC PROCEDURES: CPT

## 2017-03-25 PROCEDURE — 85018 HEMOGLOBIN: CPT | Performed by: ORTHOPAEDIC SURGERY

## 2017-03-25 PROCEDURE — 63710000001 INSULIN ASPART PER 5 UNITS: Performed by: HOSPITALIST

## 2017-03-25 PROCEDURE — 97110 THERAPEUTIC EXERCISES: CPT

## 2017-03-25 PROCEDURE — 82962 GLUCOSE BLOOD TEST: CPT

## 2017-03-25 RX ORDER — OXYCODONE AND ACETAMINOPHEN 7.5; 325 MG/1; MG/1
TABLET ORAL
Qty: 60 TABLET | Refills: 0
Start: 2017-03-25

## 2017-03-25 RX ORDER — WARFARIN SODIUM 5 MG/1
5 TABLET ORAL
Start: 2017-03-25

## 2017-03-25 RX ORDER — SENNA AND DOCUSATE SODIUM 50; 8.6 MG/1; MG/1
2 TABLET, FILM COATED ORAL 2 TIMES DAILY
Start: 2017-03-25

## 2017-03-25 RX ORDER — BISACODYL 10 MG
10 SUPPOSITORY, RECTAL RECTAL DAILY PRN
Refills: 0
Start: 2017-03-25

## 2017-03-25 RX ORDER — WARFARIN SODIUM 5 MG/1
5 TABLET ORAL
Status: DISCONTINUED | OUTPATIENT
Start: 2017-03-25 | End: 2017-03-25 | Stop reason: HOSPADM

## 2017-03-25 RX ORDER — ACETAMINOPHEN 325 MG/1
650 TABLET ORAL EVERY 4 HOURS PRN
Refills: 0
Start: 2017-03-25

## 2017-03-25 RX ADMIN — OXYCODONE HYDROCHLORIDE AND ACETAMINOPHEN 1 TABLET: 7.5; 325 TABLET ORAL at 12:56

## 2017-03-25 RX ADMIN — ATORVASTATIN CALCIUM 20 MG: 20 TABLET, FILM COATED ORAL at 07:49

## 2017-03-25 RX ADMIN — DOCUSATE SODIUM,SENNOSIDES 2 TABLET: 50; 8.6 TABLET, FILM COATED ORAL at 12:56

## 2017-03-25 RX ADMIN — INSULIN ASPART 2 UNITS: 100 INJECTION, SOLUTION INTRAVENOUS; SUBCUTANEOUS at 07:49

## 2017-03-25 RX ADMIN — LOSARTAN POTASSIUM 25 MG: 25 TABLET, FILM COATED ORAL at 12:56

## 2017-03-25 RX ADMIN — DOCUSATE SODIUM,SENNOSIDES 2 TABLET: 50; 8.6 TABLET, FILM COATED ORAL at 07:49

## 2017-03-25 RX ADMIN — OXYCODONE HYDROCHLORIDE AND ACETAMINOPHEN 1 TABLET: 7.5; 325 TABLET ORAL at 04:07

## 2017-03-25 NOTE — THERAPY DISCHARGE NOTE
Acute Care - Physical Therapy Treatment Note/Discharge  [facility]     Patient Name: Rebecca Peterson  : 1931  MRN: [id number]  Today's Date: 3/25/2017  Onset of Illness/Injury or Date of Surgery Date: 17     Referring Physician: Dr. Eleazar Head    Admit Date: 3/21/2017    Visit Dx:    ICD-10-CM ICD-9-CM   1. Difficulty walking R26.2 719.7   2. Primary osteoarthritis of right knee M17.11 715.16     Patient Active Problem List   Diagnosis   • Primary osteoarthritis of right knee   • Hypertension   • Type 2 diabetes mellitus   • Cardiac murmur   • Dependent edema   • Postoperative anemia due to acute blood loss       Physical Therapy Education     Title: PT OT SLP Therapies (Resolved)     Topic: Physical Therapy (Resolved)     Point: Mobility training (Resolved)    Learning Progress Summary    Learner Readiness Method Response Comment Documented by Status   Patient Acceptance E,D VU,NR  MS 17 1626 Done    Acceptance E,D VU,NR  MS 17 1121 Done    Acceptance E,D VU,NR  MS 17 1525 Done    Acceptance E,D VU,NR  MS 17 1134 Done    Acceptance E,D VU,NR  MS 17 1552 Done    Acceptance E VU   17 0940 Done   Family Acceptance E VU   17 0940 Done               Point: Home exercise program (Resolved)    Learning Progress Summary    Learner Readiness Method Response Comment Documented by Status   Patient Acceptance E,TB NR,VU  EE 17 1212 Done    Acceptance E,D VU,NR  MS 17 1626 Done    Acceptance E,D VU,NR  MS 17 1121 Done    Acceptance E,D VU,NR  MS 17 1525 Done    Acceptance E,D VU,NR  MS 17 1134 Done    Acceptance E,D VU,NR  MS 17 1552 Done    Acceptance E VU   17 0940 Done   Family Acceptance E VU   17 0940 Done               Point: Body mechanics (Resolved)    Learning Progress Summary    Learner Readiness Method Response Comment Documented by Status   Patient Acceptance E,D VU,NR  MS 17 1626 Done     Acceptance E,D VU,NR  MS 03/24/17 1121 Done    Acceptance E,D VU,NR  MS 03/23/17 1525 Done    Acceptance E,D VU,NR  MS 03/23/17 1134 Done    Acceptance E,D VU,NR  MS 03/22/17 1552 Done    Acceptance E VU  DR 03/22/17 0940 Done   Family Acceptance E VU   03/22/17 0940 Done               Point: Precautions (Resolved)    Learning Progress Summary    Learner Readiness Method Response Comment Documented by Status   Patient Acceptance E,D VU,NR  MS 03/24/17 1626 Done    Acceptance E,D VU,NR  MS 03/24/17 1121 Done    Acceptance E,D VU,NR  MS 03/23/17 1525 Done    Acceptance E,D VU,NR  MS 03/23/17 1134 Done    Acceptance E,D VU,NR  MS 03/22/17 1552 Done    Acceptance E VU   03/22/17 0940 Done   Family Acceptance E VU   03/22/17 0940 Done                      User Key     Initials Effective Dates Name Provider Type Discipline     12/01/15 -  Marilee Borja, PT Physical Therapist PT    MS 12/01/15 -  Jose Ferguson, PT Physical Therapist PT     03/07/17 -  Sumeet Smith, PT Student PT Student PT                    IP PT Goals       03/25/17 1213 03/22/17 1001       Bed Mobility PT LTG    Bed Mobility PT LTG, Date Established  03/22/17  -MAY gramajo (r t))     Bed Mobility PT LTG, Time to Achieve  3 days  -MAY gramajo (r t))     Bed Mobility PT LTG, Activity Type  all bed mobility  -MAY gramajo (r t))     Bed Mobility PT LTG, [place] Level  conditional independence  -MAY gramajo (r t))     Bed Mobility PT LTG, Date Goal Reviewed 03/25/17  -EE      Bed Mobility PT LTG, Outcome goal not met  -EE      Bed Mobility PT LTG, Reason Goal Not Met discharged from facility  -EE      Transfer Training PT LTG    Transfer Training PT LTG, Date Established  03/22/17  -MAY gramajo (r t))     Transfer Training PT LTG, Time to Achieve  3 days  -MAY gramajo (r t))     Transfer Training PT LTG, Activity Type  all transfers  -MAY PHIPPS (r t) MAY (c)     Transfer Training PT LTG, [place] Level  supervision required  -MAY  (sameer) DR MAY gramajo (t))     Transfer Training PT LTG, Assist Device  walker, rolling  -MAY (sameer) DR MAY gramajo (t))     Transfer Training PT  LTG, Date Goal Reviewed 03/25/17  -EE      Transfer Training PT LTG, Outcome goal not met  -      Transfer Training PT LTG, Reason Goal Not Met discharged from facility  -      Gait Training PT LTG    Gait Training Goal PT LTG, Date Established  03/22/17  -MAY (sameer) DR MAY gramajo (t))     Gait Training Goal PT LTG, Time to Achieve  3 days  -MYA (sameer) DR MAY gramajo (t))     Gait Training Goal PT LTG, [place] Level  conditional independence  -MAY (sameer) DR MAY gramajo (t))     Gait Training Goal PT LTG, Assist Device  walker, rolling  -MAY (sameer) DR MAY gramajo (t))     Gait Training Goal PT LTG, Distance to Achieve  50 ft  -MAY (sameer) DR MAY gramajo (t))     Gait Training Goal PT LTG, Date Goal Reviewed 03/25/17  -EE      Gait Training Goal PT LTG, Outcome goal not met  -EE      Gait Training Goal PT LTG, Reason Goal Not Met discharged from facility  -      Range of Motion PT LTG    Range of Motion Goal PT LTG, Date Established  03/22/17  -MAY (sameer) DR MAY (t) (jenna)     Range of Motion Goal PT LTG, Time to Achieve  3 days  -MAY (sameer) DR MAY (t) (jenna)     Range fo Motion Goal PT LTG, Joint  R knee  -MAY (sameer) DR MAY (t) (jenna)     Range of Motion Goal PT LTG, AROM Measure  5 to 90 degrees  -MAY (sameer) DR MAY gramajo (t))     Range of Motion Goal PT LTG, Date Goal Reviewed 03/25/17  -EE      Range of Motion Goal PT LTG, Outcome goal not met  -      Reason Goal Not Met (ROM) PT LTG discharged from facility  -      Patient Education PT LTG    Patient Education PT LTG, Date Established  03/22/17  -MAY (sameer) DR MAY gramajo (t))     Patient Education PT LTG, Time to Achieve  3 days  -MAY (sameer) DR MAY gramajo (t))     Patient Education PT LTG, Education Type  HEP  -MAY (sameer) DR MAY gramajo (t))     Patient Education PT LTG, Education Understanding  verbalize understanding  -MAY (r)  MAY (c)     Patient Education PT LTG, Date Goal Reviewed 03/25/17  -EE      Patient Education PT  "LTG Outcome goal met  -EE        User Key  (r) = Recorded By, (t) = Taken By, (c) = Cosigned By    Initials Name Provider Type    MAY Alberts, PT Physical Therapist    EE Marilee Borja, PT Physical Therapist    DR Sumeet Smith, PT Student PT Student              Adult Rehabilitation Note       03/25/17 1035 03/24/17 1624 03/24/17 1118    Rehab Assessment/Intervention    Discipline physical therapist  -EE physical therapist  -MS physical therapist  -MS    Document Type therapy note (daily note)  -EE therapy note (daily note)  -MS therapy note (daily note)  -MS    Subjective Information agree to therapy  -EE agree to therapy;complains of;fatigue;pain  -MS agree to therapy;complains of;pain  -MS    Patient Effort, Rehab Treatment good  -EE good  -MS good  -MS    Symptoms Noted Comment  Pt. reports feeling \"alright\" this PM.  Awake, alert, and cooperative with P.T.  -MS Pt. reports feeling \"okay\" this AM with minimal c/o pain.   -MS    Precautions/Limitations fall precautions  -EE fall precautions  -MS fall precautions  -MS    Recorded by [EE] Marilee Borja, PT [MS] Jose Ferguson, PT [MS] Jose Ferguson, PT    Pain Assessment    Pain Assessment No/denies pain  -EE 0-10  -MS 0-10  -MS    Pain Score  3  -MS 3  -MS    Post Pain Score  3  -MS 3  -MS    Pain Type  Acute pain;Surgical pain  -MS Acute pain;Surgical pain  -MS    Pain Location  Knee  -MS Knee  -MS    Pain Orientation  Right  -MS Right  -MS    Recorded by [EE] Marilee Borja, PT [MS] Jose Ferguson, PT [MS] Jose Ferguson, PT    Cognitive Assessment/Intervention    Current Cognitive/Communication Assessment functional  -EE functional  -MS functional  -MS    Orientation Status oriented x 4  -EE oriented x 4  -MS oriented x 4  -MS    Follows Commands/Answers Questions 100% of the time  -% of the time  -% of the time  -MS    Personal Safety WNL/WFL  -EE WNL/WFL  -MS WNL/WFL  -MS    Personal Safety Interventions fall prevention program " maintained;gait belt;muscle strengthening facilitated;nonskid shoes/slippers when out of bed;supervised activity  -EE fall prevention program maintained;gait belt;nonskid shoes/slippers when out of bed;supervised activity  -MS fall prevention program maintained;gait belt;nonskid shoes/slippers when out of bed;supervised activity  -MS    Recorded by [EE] Marilee Borja, PT [MS] Jose Ferguson, PT [MS] Jose Ferguson PT    ROM (Range of Motion)    General ROM Detail R knee 23-71  -EE  Right knee AROM (8, 79)  *8 degress from 0 degrees extension.  -MS    Recorded by [EE] Marilee Borja PT  [MS] Jose Ferguson PT    Bed Mobility, Assessment/Treatment    Bed Mob, Supine to Sit, [place] not tested  -EE      Bed Mob, Sit to Supine, [place] not tested  -EE      Bed Mobility, Comment  Pt. up in chair this PM for the gym.  -MS Pt. up in chair this AM for gym.  -MS    Recorded by [EE] Marilee Borja, PT [MS] Jose Ferguson PT [MS] Jose Ferguson PT    Transfer Assessment/Treatment    Transfers, Sit-Stand [place] not tested  -EE minimum assist (75% patient effort)  -MS minimum assist (75% patient effort)  -MS    Transfers, Stand-Sit [place]  minimum assist (75% patient effort)  -MS minimum assist (75% patient effort)  -MS    Transfers, Sit-Stand-Sit, Assist Device  rolling walker  -MS rolling walker  -MS    Transfer, Comment transfers/gait deferred due to BR needs following completion of HEP  -EE      Recorded by [EE] Marilee Bojra, PT [MS] Jose Ferguson, PT [MS] Jose Ferguson PT    Gait Assessment/Treatment    Gait, [place] Level not tested  -EE contact guard assist  -MS contact guard assist;2 person assist required  -MS    Gait, Assistive Device  rolling walker  -MS rolling walker  -MS    Gait, Distance (Feet)  100  -MS 85  -MS    Gait, Gait Deviations  right:;antalgic;nadir decreased;decreased heel strike;forward flexed posture;narrow base;step length decreased  -MS  right:;antalgic;nadir decreased;decreased heel strike;forward flexed posture;step length decreased  -MS    Gait, Comment  Pt. requires continued verbal/tactile cues for posture correction, proper gait sequencing with use of RWX, and to increase her bilateral step length as well as heel strike.  -MS Pt. requires verbal/tactile cues for posture correction and to increase her bilateral step length as well as heel strike.  x 2 standing rest breaks.  Pt. also able to initiate reciprocating gait pattern during ambulation this AM.  -MS    Recorded by [EE] Marilee Borja, PT [MS] Jose Ferguson PT [MS] Jose Ferguson PT    Therapy Exercises    Exercise Protocols total knee  -EE total knee  -MS total knee  -MS    Total Knee Exercises right:;30 reps;completed protocol;with assist  -EE right:;30 reps;completed protocol;with assist  -MS right:;30 reps;completed protocol;with assist  -MS    Recorded by [EE] Marilee Borja PT [MS] Jose Ferguson PT [MS] Jose Ferguson PT    Positioning and Restraints    Pre-Treatment Position sitting in chair/recliner  -EE sitting in chair/recliner  -MS sitting in chair/recliner  -MS    Post Treatment Position chair  -EE chair  -MS chair  -MS    In Chair reclined;call light within reach;encouraged to call for assist;with family/caregiver;legs elevated  -EE notified nsg;reclined;sitting;call light within reach;encouraged to call for assist;with family/caregiver   Ice pack to Right knee.  -MS notified nsg;reclined;sitting;call light within reach;encouraged to call for assist;with family/caregiver   Ice pack to Right knee.  -MS    Recorded by [EE] Marilee Borja, PT [MS] Jose Ferguson PT [MS] Jose Ferguson PT      03/23/17 1522 03/23/17 1132 03/22/17 1548    Rehab Assessment/Intervention    Discipline physical therapist  -MS physical therapist  -MS physical therapist  -MS    Document Type therapy note (daily note)  -MS therapy note (daily note)  -MS therapy note (daily note)  -MS     Subjective Information agree to therapy;complains of;fatigue;pain  -MS agree to therapy;complains of;fatigue;pain  -MS agree to therapy;complains of;weakness;fatigue;pain  -MS    Patient Effort, Rehab Treatment good  -MS good  -MS good  -MS    Symptoms Noted Comment Pt. reports feeling fatigue but overall does not feel dizzy, lightheaded despite low Hgb.  -MS  Pt. reports pain/soreness in her Right knee this PM as well as feeling tired during ambulation.  Otherwise, pt. agreeable to all P.T.  -MS    Precautions/Limitations fall precautions  -MS fall precautions  -MS fall precautions  -MS    Recorded by [MS] Jose Ferguson PT [MS] Jose Fegruson PT [MS] Jose Ferguson PT    Pain Assessment    Pain Assessment 0-10  -MS 0-10  -MS 0-10  -MS    Pain Score 3  -MS 3  -MS 5  -MS    Post Pain Score 3  -MS 3  -MS 5  -MS    Pain Type Acute pain;Surgical pain  -MS Acute pain;Surgical pain  -MS Acute pain;Surgical pain  -MS    Pain Location Knee  -MS Knee  -MS Knee  -MS    Pain Orientation Right  -MS Right  -MS Right  -MS    Pain Intervention(s)   Medication (See MAR);Cold applied;Repositioned;Elevated;Rest  -MS    Recorded by [MS] Jose Ferguson PT [MS] Jose Ferguson PT [MS] Jose Ferguson PT    Cognitive Assessment/Intervention    Current Cognitive/Communication Assessment functional  -MS functional  -MS     Orientation Status oriented x 4  -MS oriented x 4  -MS     Follows Commands/Answers Questions 100% of the time  -% of the time  -MS     Personal Safety WNL/WFL  -MS WNL/WFL  -MS     Personal Safety Interventions fall prevention program maintained;gait belt;supervised activity;nonskid shoes/slippers when out of bed  -MS fall prevention program maintained;gait belt;nonskid shoes/slippers when out of bed;supervised activity  -MS     Recorded by [MS] Jose Ferguson PT [MS] Jose Ferguson PT     ROM (Range of Motion)    General ROM Detail  Right knee AROM (-9, 76)  -MS Right Knee AROM (-10, 71)   -MS    Recorded by  [MS] Jose Ferguson, PT [MS] Jose Ferguson PT    Bed Mobility, Assessment/Treatment    Bed Mobility, Comment Pt. up in chair this PM.  -MS Up in chair this AM.  -MS Pt. up in chair this PM.  -MS    Recorded by [MS] Jose Ferguson PT [MS] Jose Ferguson PT [MS] Jose Ferguson PT    Transfer Assessment/Treatment    Transfers, Sit-Stand [place] minimum assist (75% patient effort)  -MS contact guard assist  -MS minimum assist (75% patient effort)  -MS    Transfers, Stand-Sit [place] minimum assist (75% patient effort)  -MS contact guard assist  -MS minimum assist (75% patient effort)  -MS    Transfers, Sit-Stand-Sit, Assist Device rolling walker  -MS rolling walker  -MS rolling walker  -MS    Recorded by [MS] Jose Ferguson PT [MS] Jose Ferguson PT [MS] Jose Ferguson PT    Gait Assessment/Treatment    Gait, [place] Level contact guard assist  -MS minimum assist (75% patient effort)  -MS contact guard assist;2 person assist required  -MS    Gait, Assistive Device rolling walker  -MS rolling walker  -MS rolling walker  -MS    Gait, Distance (Feet) 60  -MS 45  -MS 25  -MS    Gait, Gait Deviations right:;antalgic;nadir decreased;decreased heel strike;forward flexed posture;step length decreased  -MS right:;antalgic;nadir decreased;forward flexed posture  -MS right:;antalgic;nadir decreased;decreased heel strike;forward flexed posture;narrow base;step length decreased  -MS    Gait, Comment Pt. requires verbal/tactile cues for posture correction, to increase her bilateral step length and heel strike, and for proper gait sequencing with use of RWX.  Pt. fatigues quickly with upright mobility.  -MS Verbal/tactile cues required for posture correction and for proper gait sequencing with use of walker.  -MS Pt. requires min. verbal/tactile cues for posture correction and proper gait sequencing with use of RWX.  x 2 standing rest breaks due to fatigue.  -MS     Recorded by [MS] Jose Ferguson PT [MS] Jose Ferguson PT [MS] Jose Ferguson PT    Therapy Exercises    Exercise Protocols total knee  -MS total knee  -MS total knee  -MS    Total Knee Exercises right:;25 reps;completed protocol  -MS right:;20 reps;completed protocol  -MS right:;15 reps;completed protocol  -MS    Recorded by [MS] Jose Ferguson PT [MS] Jose Ferguson PT [MS] Jose Ferguson PT    Positioning and Restraints    Pre-Treatment Position sitting in chair/recliner  -MS sitting in chair/recliner  -MS sitting in chair/recliner  -MS    Post Treatment Position chair  -MS chair  -MS chair  -MS    In Chair notified nsg;sitting;reclined;call light within reach;encouraged to call for assist;with family/caregiver   Ice pack to Right knee.  -MS notified nsg;reclined;sitting;call light within reach;encouraged to call for assist;with family/caregiver   Ice pack to Right knee.  -MS notified nsg;reclined;sitting;call light within reach;encouraged to call for assist;with family/caregiver   Ice pack to Right knee.  -MS    Recorded by [MS] Jose Ferguson PT [MS] Jose Ferguson PT [MS] Jose Ferguson PT      User Key  (r) = Recorded By, (t) = Taken By, (c) = Cosigned By    Initials Name Effective Dates    EE Marilee Borja, PT 12/01/15 -     MS Jose Ferguson PT 12/01/15 -           PT Recommendation and Plan  Anticipated Equipment Needs At Discharge:  (none)  Anticipated Discharge Disposition: skilled nursing facility  Planned Therapy Interventions: bed mobility training, gait training, home exercise program, ROM (Range of Motion), strengthening  PT Frequency: 2 times/day  Plan of Care Review  Plan Of Care Reviewed With: patient  Progress: progress toward functional goals as expected  Outcome Summary/Follow up Plan: Pt demonstrates good understanding and tolerance of HEP. Transfers and gait deferred this am due to BR needs. Pt denies pain at time of PT; no new concerns. Planning to [facility]  today for further PT.           Outcome Measures       03/25/17 1200 03/24/17 1600 03/24/17 1100    How much help from another person do you currently need...    Turning from your back to your side while in flat bed without using bedrails? 4  -EE 4  -MS 4  -MS    Moving from lying on back to sitting on the side of a flat bed without bedrails? 3  -EE 3  -MS 3  -MS    Moving to and from a bed to a chair (including a wheelchair)? 3  -EE 3  -MS 3  -MS    Standing up from a chair using your arms (e.g., wheelchair, bedside chair)? 3  -EE 3  -MS 3  -MS    Climbing 3-5 steps with a railing? 2  -EE 2  -MS 2  -MS    To walk in hospital room? 3  -EE 3  -MS 3  -MS    AM-PAC 6 Clicks Score 18  -EE 18  -MS 18  -MS    Functional Assessment    Outcome Measure Options AM-PAC 6 Clicks Basic Mobility (PT)  -EE AM-PAC 6 Clicks Basic Mobility (PT)  -MS AM-PAC 6 Clicks Basic Mobility (PT)  -MS      03/23/17 1500 03/23/17 1100 03/22/17 1500    How much help from another person do you currently need...    Turning from your back to your side while in flat bed without using bedrails? 4  -MS 4  -MS 4  -MS    Moving from lying on back to sitting on the side of a flat bed without bedrails? 3  -MS 3  -MS 3  -MS    Moving to and from a bed to a chair (including a wheelchair)? 3  -MS 3  -MS 3  -MS    Standing up from a chair using your arms (e.g., wheelchair, bedside chair)? 3  -MS 3  -MS 3  -MS    Climbing 3-5 steps with a railing? 2  -MS 2  -MS 2  -MS    To walk in hospital room? 3  -MS 3  -MS 3  -MS    AM-PAC 6 Clicks Score 18  -MS 18  -MS 18  -MS    Functional Assessment    Outcome Measure Options AM-PAC 6 Clicks Basic Mobility (PT)  -MS AM-PAC 6 Clicks Basic Mobility (PT)  -MS AM-PAC 6 Clicks Basic Mobility (PT)  -MS      User Key  (r) = Recorded By, (t) = Taken By, (c) = Cosigned By    Initials Name Provider Type    MILIND Borja, PT Physical Therapist    MS Jose Ferguson, PT Physical Therapist           Time Calculation:         PT  Charges       03/25/17 1214          Time Calculation    Start Time 1035  -EE      Stop Time 1120  -EE      Time Calculation (min) 45 min  -EE      PT Received On 03/25/17  -EE        User Key  (r) = Recorded By, (t) = Taken By, (c) = Cosigned By    Initials Name Provider Type    EE Marilee Borja PT Physical Therapist          Therapy Charges for Today     Code Description Service Date Service Provider Modifiers Qty    48480243379 HC PT THER PROC EA 15 MIN 3/25/2017 Marilee Borja, PT GP 1    43350743586 HC PT THER PROC GROUP 3/25/2017 Marilee Borja PT GP 1          PT G-Codes  Outcome Measure Options: AM-PAC 6 Clicks Basic Mobility (PT)    PT Discharge Summary  Reason for Discharge: Discharge from facility  Outcomes Achieved: Refer to plan of care for updates on goals achieved  Discharge Destination: SNF    Marilee Borja PT  3/25/2017

## 2017-03-25 NOTE — DISCHARGE SUMMARY
Date of Admission:  3/21/2017    Date of Discharge:  3/25/2017    Discharge Diagnosis: s/p Right total knee arthroplasty    Admitting Physician: Eleazar Head MD    Consults: MD Milena Kruger MD    DETAILS OF HOSPITAL STAY:  Patient is a 85 y.o. female was admitted to the floor following a total knee arthroplasty.  Post-operatively the patient was transferred to the post-operative floor where the patient underwent physical therapy that included active as well as passive ROM exercises. Opioids were titrated to achieve appropriate pain management to allow for participation in mobilization exercises. Hg dropped to 7.8 on POD #2 and she was transfused with two units PBCs.  Yesterday HG was 10.4 HCT 30. Hg today is 9.6 HCT 26.0. Vital signs are now stable. The incision is benign without signs or symptoms of infection. Operative extremity neurovascular status remains intact. Patient does have a history of chronic edema RLE. Doppler from yesterday was negative for DVT.  Appropriate education re: incision care, activity levels, medications, and follow up visits was completed and all questions were answered. The patient is now deemed stable for discharge to [facility].    Condition on Discharge:  Stable    Discharge Medications   Jonathan Petersonred   Home Medication Instructions KUN:[id number]    Printed on:03/25/17 0519   Medication Information                      acetaminophen (TYLENOL) 325 MG tablet  Take 2 tablets by mouth Every 4 (Four) Hours As Needed for Mild Pain (1-3).             amLODIPine (NORVASC) 2.5 MG tablet  Take 2.5 mg by mouth Daily With Lunch.             bisacodyl (DULCOLAX) 10 MG suppository  Insert 1 suppository into the rectum Daily As Needed for Constipation.             losartan (COZAAR) 100 MG tablet  Take 100 mg by mouth Daily With Lunch.             metFORMIN (GLUCOPHAGE) 500 MG tablet  Take 500 mg by mouth Daily Before Supper.              oxyCODONE-acetaminophen (PERCOCET) 7.5-325 MG per tablet  Take 1-2 tabs po q 4 hours prn pain             pantoprazole (PROTONIX) 20 MG EC tablet  Take 20 mg by mouth As Needed for heartburn.             sennosides-docusate sodium (SENOKOT-S) 8.6-50 MG tablet  Take 2 tablets by mouth 2 (Two) Times a Day.             simvastatin (ZOCOR) 40 MG tablet  Take 40 mg by mouth Every Night.             triamterene-hydrochlorothiazide (MAXZIDE-25) 37.5-25 MG per tablet  Take 1 tablet by mouth Daily With Lunch.             warfarin (COUMADIN) 5 MG tablet  Take 1 tablet by mouth Daily.                 Discharge Diet: regular    Activity at Discharge: as tolerated    Discharge Instructions:   1)  Patient is to continue with physical therapy exercises daily and continue working with the physical therapist as ordered.  2)  Continue to follow precautions as instructed.   3)  Patient may weight bear as tolerated.   4)  Continue SHARONDA hose daily and ice regularly. Patient instructed on frequent calf pumping exercises.  Patient also instructed on incentive spirometer during hospitalization and encouraged to continue to use at home regularly.   5)  Patient is instructed to continue DVT prophylaxis of Coumadin.  Patient will need protime tomorrow.  INR today is 1.6.  Coumadin increased to 5mg daily  6)  The dressing should be left in place. If waterproof dressing is intact the patient may shower immediately following discharge. If the dressing becomes disloged or saturated it should be changed and patient must wait until POD #5 to shower. If dressing is changed, apply dry sterile dressing after showering.  7)  Follow up appointment in 2 weeks - patient to call the office at [phone] to schedule.   8)  Continue CPM machine for 1-2 hours BIDX 3 weeks and increase flexion daily as tolerated    Follow-up Appointments  2 weeks with Dr Adilene Barrow, RN  03/25/17  5:19 AM    Eleazar Head MD

## 2017-03-25 NOTE — PLAN OF CARE
Problem: Patient Care Overview (Adult)  Goal: Plan of Care Review  Outcome: Ongoing (interventions implemented as appropriate)    03/25/17 0349   Coping/Psychosocial Response Interventions   Plan Of Care Reviewed With patient   Patient Care Overview   Progress improving   Outcome Evaluation   Outcome Summary/Follow up Plan patient resting comfortably through night, states having no pain at this time, plans d/c to rehab 3/25/17       Goal: Adult Individualization and Mutuality  Outcome: Ongoing (interventions implemented as appropriate)  Goal: Discharge Needs Assessment  Outcome: Ongoing (interventions implemented as appropriate)    Problem: Fall Risk (Adult)  Goal: Absence of Falls  Outcome: Ongoing (interventions implemented as appropriate)    Problem: Knee Replacement, Total (Adult)  Goal: Signs and Symptoms of Listed Potential Problems Will be Absent or Manageable (Knee Replacement, Total)  Outcome: Ongoing (interventions implemented as appropriate)

## 2017-03-25 NOTE — PLAN OF CARE
Problem: Patient Care Overview (Adult)  Goal: Plan of Care Review    03/25/17 1213   Coping/Psychosocial Response Interventions   Plan Of Care Reviewed With patient   Patient Care Overview   Progress progress toward functional goals as expected   Outcome Evaluation   Outcome Summary/Follow up Plan Pt demonstrates good understanding and tolerance of HEP. Transfers and gait deferred this am due to BR needs. Pt denies pain at time of PT; no new concerns. Planning to DC to SNU today for further PT.          Problem: Inpatient Physical Therapy  Goal: Bed Mobility Goal LTG- PT  Outcome: Unable to achieve outcome(s) by discharge Date Met:  03/25/17 03/25/17 1213   Bed Mobility PT LTG   Bed Mobility PT LTG, Date Goal Reviewed 03/25/17   Bed Mobility PT LTG, Outcome goal not met   Bed Mobility PT LTG, Reason Goal Not Met discharged from facility       Goal: Transfer Training Goal 1 LTG- PT  Outcome: Unable to achieve outcome(s) by discharge Date Met:  03/25/17 03/25/17 1213   Transfer Training PT LTG   Transfer Training PT LTG, Date Goal Reviewed 03/25/17   Transfer Training PT LTG, Outcome goal not met   Transfer Training PT LTG, Reason Goal Not Met discharged from facility       Goal: Gait Training Goal LTG- PT  Outcome: Unable to achieve outcome(s) by discharge Date Met:  03/25/17 03/25/17 1213   Gait Training PT LTG   Gait Training Goal PT LTG, Date Goal Reviewed 03/25/17   Gait Training Goal PT LTG, Outcome goal not met   Gait Training Goal PT LTG, Reason Goal Not Met discharged from facility       Goal: Range of Motion Goal LTG- PT  Outcome: Unable to achieve outcome(s) by discharge Date Met:  03/25/17 03/25/17 1213   Range of Motion PT LTG   Range of Motion Goal PT LTG, Date Goal Reviewed 03/25/17   Range of Motion Goal PT LTG, Outcome goal not met   Reason Goal Not Met (ROM) PT LTG discharged from facility       Goal: Patient Education Goal LTG- PT  Outcome: Outcome(s) achieved Date Met:  03/25/17     03/25/17 1213   Patient Education PT LTG   Patient Education PT LTG, Date Goal Reviewed 03/25/17   Patient Education PT LTG Outcome goal met

## 2017-03-25 NOTE — PROGRESS NOTES
" LOS: 4 days   Primary Care Physician: Pollo Dahl MD     Subjective  No pain overnight. No CP SOA NVD.    Vital Signs  Body mass index is 32.22 kg/(m^2).  Temp:  [97.7 °F (36.5 °C)-99.6 °F (37.6 °C)] 99.6 °F (37.6 °C)  Heart Rate:  [76-94] 76  Resp:  [16] 16  BP: (107-135)/(55-67) 125/60      Objective:  General Appearance:  Comfortable and in no acute distress.    Vital signs: (most recent): Blood pressure 125/60, pulse 76, temperature 99.6 °F (37.6 °C), temperature source Oral, resp. rate 16, height 60\" (152.4 cm), weight 165 lb (74.8 kg), SpO2 96 %.  Vital signs are normal.    HEENT: Normal HEENT exam.    Lungs:  Normal respiratory rate and normal effort.  Breath sounds clear to auscultation.    Heart: Normal rate.  Regular rhythm.    Abdomen: Abdomen is soft.  There is no abdominal tenderness.     Extremities: There is dependent edema.    Pulses: Distal pulses are intact.    Neurological: Patient is alert.    Pupils:  Pupils are equal, round, and reactive to light.    Skin:  Warm and dry.                Results Review:    I reviewed the patient's new clinical results.      Results from last 7 days  Lab Units 03/25/17  0400 03/24/17  0423   WBC 10*3/mm3  --  13.50*   HEMOGLOBIN g/dL 9.6* 10.4*   PLATELETS 10*3/mm3  --  197       Results from last 7 days  Lab Units 03/23/17  0406   SODIUM mmol/L 134*   POTASSIUM mmol/L 4.3   CHLORIDE mmol/L 98   TOTAL CO2 mmol/L 23.9   BUN mg/dL 24*   CREATININE mg/dL 0.85   CALCIUM mg/dL 9.6   GLUCOSE mg/dL 153*       Results from last 7 days  Lab Units 03/25/17  0400   INR  1.68*     Hemoglobin A1C:  Lab Results   Component Value Date    HGBA1C 6.36 (H) 03/22/2017       Glucose Range:  Glucose   Date/Time Value Ref Range Status   03/25/2017 0633 164 (H) 70 - 130 mg/dL Final   03/24/2017 2124 154 (H) 70 - 130 mg/dL Final   03/24/2017 1639 159 (H) 70 - 130 mg/dL Final   03/24/2017 1127 192 (H) 70 - 130 mg/dL Final   03/24/2017 0613 161 (H) 70 - 130 mg/dL Final   03/23/2017 2249 " 131 (H) 70 - 130 mg/dL Final       Medication Review: Yes    Physical Therapy:    Assessment/Plan     Active Hospital Problems (** Indicates Principal Problem)    Diagnosis Date Noted   • **Primary osteoarthritis of right knee [M17.11] 03/21/2017   • Postoperative anemia due to acute blood loss [D62] 03/23/2017   • Hypertension [I10] 03/21/2017   • Type 2 diabetes mellitus [E11.9] 03/21/2017   • Cardiac murmur [R01.1] 03/21/2017   • Dependent edema [R60.9] 03/21/2017      Resolved Hospital Problems    Diagnosis Date Noted Date Resolved   No resolved problems to display.       Assessment & Plan  -sp R TKA 03/21  -A1c 6.36. Renal function is stable. Can resume home regimen upon discharge. Will continue SSI while in house.  -BP acceptable on current regimen.  -Coumadin per Ortho, INR 1.6.  Disposition:    Joshua Garcia MD  03/25/17  8:40 AM

## 2017-03-26 LAB
BUN SERPL-MCNC: 11 MG/DL (ref 6–25)
BUN/CREAT RATIO (CALC): 18 (ref 12.1–20.1)
CHLORIDE: 101 MMOL/L (ref 98–107)
CO2 (BICARBONATE): 25 MMOL/L (ref 23–33)
CREATININE: 0.6 MG/DL (ref 0.5–1)
GLUCOSE SERPL-MCNC: 140 MG/DL (ref 83–110)
HCT: 29.3 % (ref 37–47)
HGB BLD-MCNC: 9.6 G/DL (ref 12.5–16)
INR PPP: 1.66 (ref 0.9–1.2)
MCH RBC QN AUTO: 27.3 PG (ref 25–31)
MCHC RBC AUTO-ENTMCNC: 32.8 G/DL (ref 32–36)
MCV: 83.2 FL (ref 78–100)
MPV: 8.2 FL (ref 6–9.5)
PLT: 286 K/UL (ref 150–400)
POTASSIUM: 4.3 MMOL/L (ref 3.5–5.1)
PROTHROMBIN TIME: 19.1 SECONDS (ref 11.7–14)
RBC: 3.52 M/UL (ref 4.2–5.4)
RDW: 14.6 % (ref 11.5–14)
WBC: 9.5 K/UL (ref 4–10.5)

## 2017-03-27 LAB
INR PPP: 1.89 (ref 0.9–1.2)
PROTHROMBIN TIME: 21.1 SECONDS (ref 11.7–14)

## 2017-03-27 NOTE — DISCHARGE SUMMARY
Orthopedic Discharge Summary      Patient: Rebecca Peterson      YOB: 1931    Medical Record Number: [id number]    Attending Physician: Eleazar Head MD  Consulting Physician(s): TRINY Martinez hospitalist and [facility]  Date of Admission: 3/21/2017 10:14 AM  Date of Discharge: 3/25/2017      Patient Active Problem List   Diagnosis   • Primary osteoarthritis of right knee   • Hypertension   • Type 2 diabetes mellitus   • Cardiac murmur   • Dependent edema   • Postoperative anemia due to acute blood loss     [place] TOTAL KNEE ARTHROPLASTY [62755] (TOTAL KNEE ARTHROPLASTY with [place] [employer])     No Known Allergies    Current Medications:   Rebecca Peterson   Home Medication Instructions KUN:[id number]    Printed on:03/26/17 7915   Medication Information                      acetaminophen (TYLENOL) 325 MG tablet  Take 2 tablets by mouth Every 4 (Four) Hours As Needed for Mild Pain (1-3).             amLODIPine (NORVASC) 2.5 MG tablet  Take 2.5 mg by mouth Daily With Lunch.             bisacodyl (DULCOLAX) 10 MG suppository  Insert 1 suppository into the rectum Daily As Needed for Constipation.             losartan (COZAAR) 100 MG tablet  Take 100 mg by mouth Daily With Lunch.             metFORMIN (GLUCOPHAGE) 500 MG tablet  Take 500 mg by mouth Daily Before Supper.             oxyCODONE-acetaminophen (PERCOCET) 7.5-325 MG per tablet  Take 1-2 tabs po q 4 hours prn pain             pantoprazole (PROTONIX) 20 MG EC tablet  Take 20 mg by mouth As Needed for heartburn.             sennosides-docusate sodium (SENOKOT-S) 8.6-50 MG tablet  Take 2 tablets by mouth 2 (Two) Times a Day.             simvastatin (ZOCOR) 40 MG tablet  Take 40 mg by mouth Every Night.             triamterene-hydrochlorothiazide (MAXZIDE-25) 37.5-25 MG per tablet  Take 1 tablet by mouth Daily With Lunch.             warfarin (COUMADIN) 5 MG tablet  Take 1 tablet by mouth Daily.                      Past Medical History:  "  Diagnosis Date   • Acid reflux    • Cataract     BILATERAL   • Dependent edema    • Diabetes mellitus    • Hyperlipidemia    • Hypertension    • Osteoarthritis         Past Surgical History:   Procedure Laterality Date   • ANKLE SURGERY Right    • HYSTERECTOMY     • TOTAL KNEE ARTHROPLASTY Right 3/21/2017    Procedure: TOTAL KNEE ARTHROPLASTY ;  Surgeon: Eleazar Head MD;  Location: [facility] MAIN OR;  Service:         Social History     Occupational History   • Not on file.     Social History Main Topics   • Smoking status: Former Smoker     Packs/day: 0.25     Types: Cigarettes   • Smokeless tobacco: Never Used      Comment: \"QUIT YEARS AGO\"   • Alcohol use No   • Drug use: No   • Sexual activity: Defer      Social History     Social History Narrative      History reviewed. No pertinent family history.          Hospital Course:  85 y.o. female admitted to [facility] to services of No att. providers found with Primary osteoarthritis of right knee [M17.11]  Primary osteoarthritis of right knee [M17.11] on 3/21/2017 and underwent [place] TOTAL KNEE ARTHROPLASTY [62048] (TOTAL KNEE ARTHROPLASTY with [employer])  Per No att. providers found. Antibiotic and VTE prophylaxis were per [facility] protocols. Post-operatively the patient transferred to the post-operative floor where the patient underwent mobilization therapy that included active as well as passive ROM exercises. Opioids were titrated to achieve appropriate pain management to allow for participation in mobilization exercises. Vital signs are now stable. The incision is intact without signs or symptoms of infection. Operative extremity neurovascular status remains intact.   Appropriate education re: incision care, activity levels, medications, and follow up visits was completed and all questions were answered. The patient is now deemed stable for discharge from hospital.      DIAGNOSTIC TESTS:   Admission on 03/21/2017, Discharged on 03/25/2017   Component " Date Value Ref Range Status   • Glucose 03/21/2017 129  70 - 130 mg/dL Final   • Hemoglobin 03/21/2017 10.2* 11.9 - 15.5 g/dL Final   • Hematocrit 03/21/2017 32.0* 35.6 - 45.5 % Final   • Protime 03/21/2017 14.3* 11.7 - 14.2 Seconds Final   • INR 03/21/2017 1.16* 0.90 - 1.10 Final   • Glucose 03/21/2017 209* 70 - 130 mg/dL Final   • Glucose 03/21/2017 264* 70 - 130 mg/dL Final   • Hemoglobin 03/22/2017 8.8* 11.9 - 15.5 g/dL Final   • Hematocrit 03/22/2017 27.7* 35.6 - 45.5 % Final   • Protime 03/22/2017 14.5* 11.7 - 14.2 Seconds Final   • INR 03/22/2017 1.17* 0.90 - 1.10 Final   • Hemoglobin A1C 03/22/2017 6.36* 4.80 - 5.60 % Final   • Glucose 03/22/2017 183* 70 - 130 mg/dL Final   • Glucose 03/22/2017 165* 70 - 130 mg/dL Final   • Glucose 03/22/2017 196* 70 - 130 mg/dL Final   • Glucose 03/22/2017 158* 70 - 130 mg/dL Final   • Hemoglobin 03/23/2017 7.6* 11.9 - 15.5 g/dL Final   • Hematocrit 03/23/2017 23.7* 35.6 - 45.5 % Final   • Protime 03/23/2017 17.5* 11.7 - 14.2 Seconds Final   • INR 03/23/2017 1.49* 0.90 - 1.10 Final   • Glucose 03/23/2017 153* 65 - 99 mg/dL Final   • BUN 03/23/2017 24* 8 - 23 mg/dL Final   • Creatinine 03/23/2017 0.85  0.57 - 1.00 mg/dL Final   • Sodium 03/23/2017 134* 136 - 145 mmol/L Final   • Potassium 03/23/2017 4.3  3.5 - 5.2 mmol/L Final   • Chloride 03/23/2017 98  98 - 107 mmol/L Final   • CO2 03/23/2017 23.9  22.0 - 29.0 mmol/L Final   • Calcium 03/23/2017 9.6  8.6 - 10.5 mg/dL Final   • eGFR   Amer 03/23/2017 77  >60 mL/min/1.73 Final   • BUN/Creatinine Ratio 03/23/2017 28.2* 7.0 - 25.0 Final   • Anion Gap 03/23/2017 12.1  mmol/L Final   • Hemoglobin 03/23/2017 7.8* 11.9 - 15.5 g/dL Final   • Hematocrit 03/23/2017 23.5* 35.6 - 45.5 % Final   • Glucose 03/23/2017 181* 70 - 130 mg/dL Final   • Glucose 03/23/2017 168* 70 - 130 mg/dL Final   • Product Code 03/24/2017 [id number]   Final   • Unit Number 03/24/2017 [id number]   Final   • UNIT  ABO 03/24/2017 O   Final   • UNIT   RH 03/24/2017 POS   Final   • Dispense Status 03/24/2017 PT   Final   • Blood Type 03/24/2017 OPOS   Final   • Blood Expiration Date 03/24/2017 201704172359   Final   • Blood Type Barcode 03/24/2017 5100   Final   • Product Code 03/24/2017 [id number]   Final   • Unit Number 03/24/2017 [id number]   Final   • UNIT  ABO 03/24/2017 O   Final   • UNIT  RH 03/24/2017 POS   Final   • Dispense Status 03/24/2017 PT   Final   • Blood Type 03/24/2017 OPOS   Final   • Blood Expiration Date 03/24/2017 201704172359   Final   • Blood Type Barcode 03/24/2017 5100   Final   • ABO Type 03/23/2017 O   Final   • RH type 03/23/2017 Positive   Final   • Antibody Screen 03/23/2017 Negative   Final   • Glucose 03/23/2017 165* 70 - 130 mg/dL Final   • Glucose 03/23/2017 131* 70 - 130 mg/dL Final   • Protime 03/24/2017 16.9* 11.7 - 14.2 Seconds Final   • INR 03/24/2017 1.43* 0.90 - 1.10 Final   • WBC 03/24/2017 13.50* 4.50 - 10.70 10*3/mm3 Final   • RBC 03/24/2017 3.88* 3.90 - 5.20 10*6/mm3 Final   • Hemoglobin 03/24/2017 10.4* 11.9 - 15.5 g/dL Final   • Hematocrit 03/24/2017 30.9* 35.6 - 45.5 % Final   • MCV 03/24/2017 79.6* 80.5 - 98.2 fL Final   • MCH 03/24/2017 26.8* 26.9 - 32.0 pg Final   • MCHC 03/24/2017 33.7  32.4 - 36.3 g/dL Final   • RDW 03/24/2017 14.5* 11.7 - 13.0 % Final   • RDW-SD 03/24/2017 41.9  37.0 - 54.0 fl Final   • MPV 03/24/2017 8.5  6.0 - 12.0 fL Final   • Platelets 03/24/2017 197  140 - 500 10*3/mm3 Final   • Neutrophil % 03/24/2017 73.3  42.7 - 76.0 % Final   • Lymphocyte % 03/24/2017 14.1* 19.6 - 45.3 % Final   • Monocyte % 03/24/2017 11.8  5.0 - 12.0 % Final   • Eosinophil % 03/24/2017 0.4  0.3 - 6.2 % Final   • Basophil % 03/24/2017 0.1  0.0 - 1.5 % Final   • Immature Grans % 03/24/2017 0.3  0.0 - 0.5 % Final   • Neutrophils, Absolute 03/24/2017 9.89* 1.90 - 8.10 10*3/mm3 Final   • Lymphocytes, Absolute 03/24/2017 1.90  0.90 - 4.80 10*3/mm3 Final   • Monocytes, Absolute 03/24/2017 1.59* 0.20 - 1.20 10*3/mm3  Final   • Eosinophils, Absolute 03/24/2017 0.06  0.00 - 0.70 10*3/mm3 Final   • Basophils, Absolute 03/24/2017 0.02  0.00 - 0.20 10*3/mm3 Final   • Immature Grans, Absolute 03/24/2017 0.04* 0.00 - 0.03 10*3/mm3 Final   • nRBC 03/24/2017 0.0  0.0 - 0.0 /100 WBC Final   • Glucose 03/24/2017 161* 70 - 130 mg/dL Final   • Right Common Femoral Spont 03/24/2017 Y   Final   • Right Common Femoral Phasic 03/24/2017 Y   Final   • Right Common Femoral Augment 03/24/2017 Y   Final   • Right Common Femoral Competent 03/24/2017 Y   Final   • Right Common Femoral Compress 03/24/2017 C   Final   • Right Saphenofemoral Junction Spont 03/24/2017 Y   Final   • Right Saphenofemoral Junction Phas* 03/24/2017 Y   Final   • Right Saphenofemoral Junction Comp* 03/24/2017 C   Final   • Right Proximal Femoral Compress 03/24/2017 C   Final   • Right Mid Femoral Spont 03/24/2017 Y   Final   • Right Mid Femoral Phasic 03/24/2017 Y   Final   • Right Mid Femoral Augment 03/24/2017 Y   Final   • Right Mid Femoral Competent 03/24/2017 Y   Final   • Right Mid Femoral Compress 03/24/2017 C   Final   • Right Distal Femoral Compress 03/24/2017 C   Final   • Right Popliteal Spont 03/24/2017 Y   Final   • Right Popliteal Phasic 03/24/2017 Y   Final   • Right Popliteal Augment 03/24/2017 Y   Final   • Right Popliteal Competent 03/24/2017 Y   Final   • Right Popliteal Compress 03/24/2017 C   Final   • Right Posterior Tibial Compress 03/24/2017 C   Final   • Right Peroneal Compress 03/24/2017 C   Final   • Right GastronemiusSoleal Compress 03/24/2017 C   Final   • Right Greater Saph AK Compress 03/24/2017 C   Final   • Right Greater Saph BK Compress 03/24/2017 C   Final   • Right Lesser Saph Compress 03/24/2017 C   Final   • Left Common Femoral Spont 03/24/2017 Y   Final   • Left Common Femoral Phasic 03/24/2017 Y   Final   • Left Common Femoral Augment 03/24/2017 Y   Final   • Left Common Femoral Competent 03/24/2017 Y   Final   • Left Common Femoral  Compress 03/24/2017 C   Final   • Protime 03/24/2017 18.6* 11.7 - 14.2 Seconds Final   • INR 03/24/2017 1.62* 0.90 - 1.10 Final   • Glucose 03/24/2017 192* 70 - 130 mg/dL Final   • Glucose 03/24/2017 159* 70 - 130 mg/dL Final   • Glucose 03/24/2017 154* 70 - 130 mg/dL Final   • Hemoglobin 03/25/2017 9.6* 11.9 - 15.5 g/dL Final   • Hematocrit 03/25/2017 29.0* 35.6 - 45.5 % Final   • Protime 03/25/2017 19.2* 11.7 - 14.2 Seconds Final   • INR 03/25/2017 1.68* 0.90 - 1.10 Final   • Glucose 03/25/2017 164* 70 - 130 mg/dL Final   • Glucose 03/25/2017 114  70 - 130 mg/dL Final     Xr Knee 1 Or 2 View Right    Result Date: 3/22/2017  Narrative: 2 RADIOGRAPHIC VIEWS OF THE RIGHT KNEE  CLINICAL HISTORY:  Postop right knee.  2 radiographic views of the right knee demonstrate a recent right knee arthroplasty. No periprosthetic lucency is identified. The prosthesis appears to be appropriately positioned. Typical postoperative changes are identified within the adjacent soft tissues.  This report was finalized on 3/22/2017 12:38 PM by Dr. Dagoberto Ahn MD.        Discharge and Follow up Instructions:   WBAT.  Ace wrap from toes to groin for swelling and DVT prophylaxis.  Tab percocet5/325 mg PO Q 6 hours PRN pain.  Tab Xarelto 10 mg PO Q day for total of 12 days.  PT protocol for TKA.  Falls and decubitus precautions.  F U in office in 10 days for incision check and F U with PCP for general post op medical check up in 1 week      Date: 3/26/2017    Eleazar Head MD      Time:25 minutes in discussion and getting the documentation prepped for [place]    EMR Dragon/Transcription disclaimer:   Much of this encounter note is an electronic transcription/translation of spoken language to printed text. The electronic translation of spoken language may per ronaldo erroneous or at times nonsensical words or phrases to be inadvertently transcribed. Although I have reviewed this note for such errors, some may still exist.

## 2017-03-29 LAB
INR PPP: 2.42 (ref 0.9–1.2)
PROTHROMBIN TIME: 25.7 SECONDS (ref 11.7–14)

## 2017-04-01 LAB
INR PPP: 2.65 (ref 0.9–1.2)
PROTHROMBIN TIME: 27.6 SECONDS (ref 11.7–14)

## 2017-04-03 LAB
INR PPP: 3.14 (ref 0.9–1.2)
PROTHROMBIN TIME: 31.5 SECONDS (ref 11.7–14)

## 2017-04-04 LAB
INR PPP: 3 (ref 0.9–1.2)
PROTHROMBIN TIME: 30.4 SECONDS (ref 11.7–14)

## 2017-04-05 LAB
INR PPP: 2.16 (ref 0.9–1.2)
PROTHROMBIN TIME: 23.5 SECONDS (ref 11.7–14)

## 2017-04-07 LAB
INR PPP: 2.38 (ref 0.9–1.2)
PROTHROMBIN TIME: 25.3 SECONDS (ref 11.7–14)

## 2017-04-10 ENCOUNTER — TELEPHONE (OUTPATIENT)
Dept: ORTHOPEDIC SURGERY | Facility: CLINIC | Age: 82
End: 2017-04-10

## 2017-04-10 NOTE — TELEPHONE ENCOUNTER
Have discussed with Dr. Head.  He is recommending that the patient start on an enteric-coated aspirin 325 mg once a day once she has completed her Coumadin therapy.  Daughter has been notified

## 2017-04-10 NOTE — TELEPHONE ENCOUNTER
Per Dr. Head, forward PT/INR to Sandra, ask Sandra about rx, Dr. Head states he thought he had given an rx to her for the patient, staples can come out after tomorrow if incision is healed.

## 2017-04-10 NOTE — TELEPHONE ENCOUNTER
ProTime today is 31.3 INR is 2.6.  Patient is now 3 weeks postop and has enough Coumadin to last for 3 more days.  Have asked them to have her finish out her present prescription and then DC her Coumadin and her pro times.  Apparently the patient missed placed her prescription for pain medicine that was given to her at the time of her discharge from rehabilitation.  When I talked to the home health nurse she had just received a call from the family that they found the prescription for narcotics.  Nurse also states that she has orders to DC the staples and wants to make sure it's okay to do at the time of her next office visit okay per Dr. Head

## 2017-04-10 NOTE — TELEPHONE ENCOUNTER
I have discussed the management of this patient in terms of PT/INR and Coumadin dosage with AMB.  She will be contacting the patient's daughter who is the primary caregiver and then proceed with further management of both the total knee replacement with physical therapy and wound management as well as the PT/INR and anticoagulation.

## 2017-04-21 ENCOUNTER — OFFICE VISIT (OUTPATIENT)
Dept: ORTHOPEDIC SURGERY | Facility: CLINIC | Age: 82
End: 2017-04-21

## 2017-04-21 DIAGNOSIS — Z96.651 STATUS POST TOTAL RIGHT KNEE REPLACEMENT: Primary | ICD-10-CM

## 2017-04-21 PROCEDURE — 99024 POSTOP FOLLOW-UP VISIT: CPT | Performed by: ORTHOPAEDIC SURGERY

## 2017-04-21 RX ORDER — METFORMIN HYDROCHLORIDE 500 MG/1
TABLET, EXTENDED RELEASE ORAL
COMMUNITY
Start: 2017-03-02

## 2017-04-21 RX ORDER — PYRAZINAMIDE 500 MG/1
1 TABLET ORAL EVERY 4 HOURS PRN
Qty: 45 TABLET | Refills: 0 | Status: SHIPPED | OUTPATIENT
Start: 2017-04-21 | End: 2017-05-01

## 2017-04-21 RX ORDER — TRIAMTERENE AND HYDROCHLOROTHIAZIDE 37.5; 25 MG/1; MG/1
CAPSULE ORAL
COMMUNITY
Start: 2017-03-02

## 2017-04-21 NOTE — PROGRESS NOTES
Chief Complaint   Patient presents with   • Right Knee - Follow-up   • Wound Check         HPI Patient returns today for a post operative follow up of her right knee.  Her incision is clean and closed.    The patient is doing extremely well post total knee arthroplasty. She has not had any postoperative complications.    She is doing extremely well with physical therapy in terms of improved range of motion.    She has a significant improvement in her quality of life because her knee does not hurt that much as it did prior to the surgery and she can actually get up and walk with the assistance of a walker and is pleased with her outcome. She has completed her course of Xarelto for DVT prophylaxis and is wanting to cut back on the use of her pain medication because her knee feels so much better than before.             There were no vitals filed for this visit.        Joint/Body Part Specific Exam:  right knee.The patient is status post total knee arthroplasty postoperative 4 week(s). Incision is clean. Calf is soft and nontender. Homans sign is negative. There is no clicking, popping or catching. Anterior and posterior drawer signs are negative, Appropriate amounts of swelling and bruising are noted. Dorsalis pedis and posterior tibial artery pulses are palpable. Common peroneal nerve function is well preserved. Range of motion is from 10- 90 degrees. Gait is cautious but otherwise fairly normal. There is no evidence of a deep seated joint infection.      X-RAY REPORT:        Rebecca was seen today for wound check and follow-up.    Diagnoses and all orders for this visit:    Status post total right knee replacement    Other orders  -     acetaminophen-codeine (TYLENOL/CODEINE #3) 300-30 MG per tablet; Take 1 tablet by mouth Every 4 (Four) Hours As Needed for Moderate Pain (4-6) (prn for pain) for up to 10 days.            Procedures        Instructions:      Plan:   Incision care.    Elevation to control  swelling.    Ace wrap from toes to groin for decreasing swelling.    Tablets of aspirin 325 mg, take 1 p.o. daily for DVT prophylaxis.    Tablets of Tylenol with codeine No.3, take 1 p.o. q.6-8 h. p.r.n. pain.    Continue with aggressive post total knee arthroplasty physical therapy with stretching and strengthening exercises.    Calcium and vitamin D for bone health.     She is very pleased with her postsurgical outcome.    Fall precautions.    , GI and dental procedure prophylaxis with antibiotics to minimize the possibility of metastatic infection of the knee joint.    Followup in 6 weeks.    Controlled substance treatment options discussed in detail. Patient's signed consent to medical options on file. RAMONA form in chart. Risks of narcotic medication usage outlined.  Possibility of physical and psychological dependence and abuse, especially long term, emphasized to the patient.

## 2017-04-22 PROBLEM — Z96.651 STATUS POST TOTAL RIGHT KNEE REPLACEMENT: Status: ACTIVE | Noted: 2017-04-22

## 2017-05-05 ENCOUNTER — TELEPHONE (OUTPATIENT)
Dept: ORTHOPEDIC SURGERY | Facility: CLINIC | Age: 82
End: 2017-05-05

## 2019-07-18 ENCOUNTER — HOSPITAL ENCOUNTER (OUTPATIENT)
Dept: OTHER | Facility: HOSPITAL | Age: 84
Discharge: HOME | End: 2019-07-18
Attending: FAMILY MEDICINE

## 2021-04-12 ENCOUNTER — OUTSIDE FACILITY SERVICE (OUTPATIENT)
Dept: ORTHOPEDIC SURGERY | Facility: CLINIC | Age: 86
End: 2021-04-12

## 2021-04-12 PROCEDURE — 99221 1ST HOSP IP/OBS SF/LOW 40: CPT | Performed by: ORTHOPAEDIC SURGERY

## 2021-05-28 ENCOUNTER — HOSPITAL ENCOUNTER (INPATIENT)
Facility: HOSPITAL | Age: 86
LOS: 5 days | Discharge: HOME HEALTH | End: 2021-06-02
Attending: INTERNAL MEDICINE | Admitting: INTERNAL MEDICINE

## 2021-05-28 DIAGNOSIS — I10 ESSENTIAL HYPERTENSION: ICD-10-CM

## 2021-05-28 DIAGNOSIS — R53.1 WEAKNESS: Primary | ICD-10-CM

## 2021-05-28 DIAGNOSIS — E87.6 HYPOKALEMIA: ICD-10-CM

## 2021-05-28 PROBLEM — D64.9 ANEMIA: Status: ACTIVE | Noted: 2021-05-28

## 2021-05-28 PROBLEM — M25.562 LEFT KNEE PAIN: Status: ACTIVE | Noted: 2021-05-28

## 2021-05-28 PROBLEM — Z74.09 PROLONGED IMMOBILIZATION: Status: ACTIVE | Noted: 2021-05-28

## 2021-05-28 PROBLEM — R33.8 ACUTE URINARY RETENTION: Status: ACTIVE | Noted: 2021-05-28

## 2021-05-28 PROBLEM — Z86.16 HISTORY OF COVID-19: Status: ACTIVE | Noted: 2021-05-28

## 2021-05-28 PROBLEM — N39.0 UTI (URINARY TRACT INFECTION): Status: ACTIVE | Noted: 2021-05-28

## 2021-05-28 LAB
BACTERIA UR QL AUTO: ABNORMAL /HPF
BILIRUB UR QL STRIP: NEGATIVE
CLARITY UR: ABNORMAL
COLOR UR: YELLOW
GLUCOSE BLDC GLUCOMTR-MCNC: 123 MG/DL (ref 70–130)
GLUCOSE UR STRIP-MCNC: NEGATIVE MG/DL
HGB UR QL STRIP.AUTO: ABNORMAL
HYALINE CASTS UR QL AUTO: ABNORMAL /LPF
INR PPP: 1.16 (ref 0.9–1.1)
KETONES UR QL STRIP: ABNORMAL
LEUKOCYTE ESTERASE UR QL STRIP.AUTO: ABNORMAL
NITRITE UR QL STRIP: NEGATIVE
PH UR STRIP.AUTO: <=5 [PH] (ref 5–8)
PROT UR QL STRIP: ABNORMAL
PROTHROMBIN TIME: 14.6 SECONDS (ref 11.7–14.2)
RBC # UR: ABNORMAL /HPF
REF LAB TEST METHOD: ABNORMAL
SP GR UR STRIP: 1.01 (ref 1–1.03)
SQUAMOUS #/AREA URNS HPF: ABNORMAL /HPF
UROBILINOGEN UR QL STRIP: ABNORMAL
WBC UR QL AUTO: ABNORMAL /HPF

## 2021-05-28 PROCEDURE — 87077 CULTURE AEROBIC IDENTIFY: CPT | Performed by: INTERNAL MEDICINE

## 2021-05-28 PROCEDURE — 85610 PROTHROMBIN TIME: CPT | Performed by: INTERNAL MEDICINE

## 2021-05-28 PROCEDURE — 87186 SC STD MICRODIL/AGAR DIL: CPT | Performed by: INTERNAL MEDICINE

## 2021-05-28 PROCEDURE — 81001 URINALYSIS AUTO W/SCOPE: CPT | Performed by: INTERNAL MEDICINE

## 2021-05-28 PROCEDURE — 87086 URINE CULTURE/COLONY COUNT: CPT | Performed by: INTERNAL MEDICINE

## 2021-05-28 PROCEDURE — 25010000002 CEFTRIAXONE PER 250 MG: Performed by: INTERNAL MEDICINE

## 2021-05-28 PROCEDURE — 82962 GLUCOSE BLOOD TEST: CPT

## 2021-05-28 PROCEDURE — 84145 PROCALCITONIN (PCT): CPT | Performed by: INTERNAL MEDICINE

## 2021-05-28 RX ORDER — MORPHINE SULFATE 2 MG/ML
1 INJECTION, SOLUTION INTRAMUSCULAR; INTRAVENOUS EVERY 4 HOURS PRN
Status: DISCONTINUED | OUTPATIENT
Start: 2021-05-28 | End: 2021-06-02 | Stop reason: HOSPADM

## 2021-05-28 RX ORDER — METFORMIN HYDROCHLORIDE 500 MG/1
500 TABLET, EXTENDED RELEASE ORAL
Status: DISCONTINUED | OUTPATIENT
Start: 2021-05-29 | End: 2021-06-02 | Stop reason: HOSPADM

## 2021-05-28 RX ORDER — OXYCODONE AND ACETAMINOPHEN 7.5; 325 MG/1; MG/1
1 TABLET ORAL EVERY 4 HOURS PRN
Status: DISCONTINUED | OUTPATIENT
Start: 2021-05-28 | End: 2021-06-02 | Stop reason: HOSPADM

## 2021-05-28 RX ORDER — AMOXICILLIN 250 MG
2 CAPSULE ORAL 2 TIMES DAILY PRN
Status: DISCONTINUED | OUTPATIENT
Start: 2021-05-28 | End: 2021-06-02 | Stop reason: HOSPADM

## 2021-05-28 RX ORDER — FUROSEMIDE 20 MG/1
20 TABLET ORAL
Status: DISCONTINUED | OUTPATIENT
Start: 2021-05-31 | End: 2021-06-02 | Stop reason: HOSPADM

## 2021-05-28 RX ORDER — ACETAMINOPHEN 650 MG/1
650 SUPPOSITORY RECTAL EVERY 4 HOURS PRN
Status: DISCONTINUED | OUTPATIENT
Start: 2021-05-28 | End: 2021-06-02 | Stop reason: HOSPADM

## 2021-05-28 RX ORDER — PANTOPRAZOLE SODIUM 40 MG/1
40 TABLET, DELAYED RELEASE ORAL DAILY PRN
Status: DISCONTINUED | OUTPATIENT
Start: 2021-05-28 | End: 2021-06-02 | Stop reason: HOSPADM

## 2021-05-28 RX ORDER — ONDANSETRON 2 MG/ML
4 INJECTION INTRAMUSCULAR; INTRAVENOUS EVERY 6 HOURS PRN
Status: DISCONTINUED | OUTPATIENT
Start: 2021-05-28 | End: 2021-06-02 | Stop reason: HOSPADM

## 2021-05-28 RX ORDER — BISACODYL 10 MG
10 SUPPOSITORY, RECTAL RECTAL DAILY PRN
Status: DISCONTINUED | OUTPATIENT
Start: 2021-05-28 | End: 2021-06-02 | Stop reason: HOSPADM

## 2021-05-28 RX ORDER — AMLODIPINE BESYLATE 2.5 MG/1
2.5 TABLET ORAL
Status: DISCONTINUED | OUTPATIENT
Start: 2021-05-29 | End: 2021-06-02 | Stop reason: HOSPADM

## 2021-05-28 RX ORDER — TRIAMTERENE AND HYDROCHLOROTHIAZIDE 37.5; 25 MG/1; MG/1
1 TABLET ORAL DAILY
Status: DISCONTINUED | OUTPATIENT
Start: 2021-05-29 | End: 2021-06-02 | Stop reason: HOSPADM

## 2021-05-28 RX ORDER — DEXTROSE MONOHYDRATE 25 G/50ML
25 INJECTION, SOLUTION INTRAVENOUS
Status: DISCONTINUED | OUTPATIENT
Start: 2021-05-28 | End: 2021-06-02 | Stop reason: HOSPADM

## 2021-05-28 RX ORDER — ACETAMINOPHEN 160 MG/5ML
650 SOLUTION ORAL EVERY 4 HOURS PRN
Status: DISCONTINUED | OUTPATIENT
Start: 2021-05-28 | End: 2021-06-02 | Stop reason: HOSPADM

## 2021-05-28 RX ORDER — LOSARTAN POTASSIUM 50 MG/1
50 TABLET ORAL
Status: DISCONTINUED | OUTPATIENT
Start: 2021-05-29 | End: 2021-06-02 | Stop reason: HOSPADM

## 2021-05-28 RX ORDER — NALOXONE HCL 0.4 MG/ML
0.4 VIAL (ML) INJECTION
Status: DISCONTINUED | OUTPATIENT
Start: 2021-05-28 | End: 2021-06-02 | Stop reason: HOSPADM

## 2021-05-28 RX ORDER — WARFARIN SODIUM 5 MG/1
5 TABLET ORAL
Status: DISCONTINUED | OUTPATIENT
Start: 2021-05-28 | End: 2021-05-29

## 2021-05-28 RX ORDER — FUROSEMIDE 20 MG/1
20 TABLET ORAL
COMMUNITY

## 2021-05-28 RX ORDER — CEFTRIAXONE SODIUM 1 G/50ML
1 INJECTION, SOLUTION INTRAVENOUS EVERY 24 HOURS
Status: DISCONTINUED | OUTPATIENT
Start: 2021-05-28 | End: 2021-05-30

## 2021-05-28 RX ORDER — NITROGLYCERIN 0.4 MG/1
0.4 TABLET SUBLINGUAL
Status: DISCONTINUED | OUTPATIENT
Start: 2021-05-28 | End: 2021-06-02 | Stop reason: HOSPADM

## 2021-05-28 RX ORDER — INSULIN LISPRO 100 [IU]/ML
0-9 INJECTION, SOLUTION INTRAVENOUS; SUBCUTANEOUS
Status: DISCONTINUED | OUTPATIENT
Start: 2021-05-29 | End: 2021-06-02 | Stop reason: HOSPADM

## 2021-05-28 RX ORDER — ACETAMINOPHEN 325 MG/1
650 TABLET ORAL EVERY 4 HOURS PRN
Status: DISCONTINUED | OUTPATIENT
Start: 2021-05-28 | End: 2021-06-02 | Stop reason: HOSPADM

## 2021-05-28 RX ORDER — SODIUM CHLORIDE 0.9 % (FLUSH) 0.9 %
10 SYRINGE (ML) INJECTION EVERY 12 HOURS SCHEDULED
Status: DISCONTINUED | OUTPATIENT
Start: 2021-05-28 | End: 2021-06-02 | Stop reason: HOSPADM

## 2021-05-28 RX ORDER — ATORVASTATIN CALCIUM 20 MG/1
20 TABLET, FILM COATED ORAL DAILY
Status: DISCONTINUED | OUTPATIENT
Start: 2021-05-29 | End: 2021-06-02 | Stop reason: HOSPADM

## 2021-05-28 RX ORDER — SODIUM CHLORIDE 0.9 % (FLUSH) 0.9 %
10 SYRINGE (ML) INJECTION AS NEEDED
Status: DISCONTINUED | OUTPATIENT
Start: 2021-05-28 | End: 2021-06-02 | Stop reason: HOSPADM

## 2021-05-28 RX ORDER — ACETAMINOPHEN 325 MG/1
650 TABLET ORAL EVERY 4 HOURS PRN
Status: DISCONTINUED | OUTPATIENT
Start: 2021-05-28 | End: 2021-05-28 | Stop reason: SDUPTHER

## 2021-05-28 RX ORDER — NICOTINE POLACRILEX 4 MG
15 LOZENGE BUCCAL
Status: DISCONTINUED | OUTPATIENT
Start: 2021-05-28 | End: 2021-06-02 | Stop reason: HOSPADM

## 2021-05-28 RX ADMIN — CEFTRIAXONE SODIUM 1 G: 1 INJECTION, SOLUTION INTRAVENOUS at 21:41

## 2021-05-29 LAB
ALBUMIN SERPL-MCNC: 2.5 G/DL (ref 3.5–5.2)
ALBUMIN/GLOB SERPL: 0.8 G/DL
ALP SERPL-CCNC: 58 U/L (ref 39–117)
ALT SERPL W P-5'-P-CCNC: 7 U/L (ref 1–33)
ANION GAP SERPL CALCULATED.3IONS-SCNC: 12.1 MMOL/L (ref 5–15)
AST SERPL-CCNC: 13 U/L (ref 1–32)
BASOPHILS # BLD AUTO: 0.03 10*3/MM3 (ref 0–0.2)
BASOPHILS NFR BLD AUTO: 0.3 % (ref 0–1.5)
BILIRUB SERPL-MCNC: 0.5 MG/DL (ref 0–1.2)
BUN SERPL-MCNC: 17 MG/DL (ref 8–23)
BUN/CREAT SERPL: 38.6 (ref 7–25)
CALCIUM SPEC-SCNC: 9.3 MG/DL (ref 8.2–9.6)
CHLORIDE SERPL-SCNC: 100 MMOL/L (ref 98–107)
CO2 SERPL-SCNC: 22.9 MMOL/L (ref 22–29)
CREAT SERPL-MCNC: 0.44 MG/DL (ref 0.57–1)
DEPRECATED RDW RBC AUTO: 45.7 FL (ref 37–54)
EOSINOPHIL # BLD AUTO: 0.05 10*3/MM3 (ref 0–0.4)
EOSINOPHIL NFR BLD AUTO: 0.6 % (ref 0.3–6.2)
ERYTHROCYTE [DISTWIDTH] IN BLOOD BY AUTOMATED COUNT: 15 % (ref 12.3–15.4)
GFR SERPL CREATININE-BSD FRML MDRD: >150 ML/MIN/1.73
GLOBULIN UR ELPH-MCNC: 3.3 GM/DL
GLUCOSE BLDC GLUCOMTR-MCNC: 101 MG/DL (ref 70–130)
GLUCOSE BLDC GLUCOMTR-MCNC: 131 MG/DL (ref 70–130)
GLUCOSE BLDC GLUCOMTR-MCNC: 163 MG/DL (ref 70–130)
GLUCOSE BLDC GLUCOMTR-MCNC: 164 MG/DL (ref 70–130)
GLUCOSE SERPL-MCNC: 112 MG/DL (ref 65–99)
HCT VFR BLD AUTO: 23.2 % (ref 34–46.6)
HGB BLD-MCNC: 7.8 G/DL (ref 12–15.9)
IMM GRANULOCYTES # BLD AUTO: 0.1 10*3/MM3 (ref 0–0.05)
IMM GRANULOCYTES NFR BLD AUTO: 1.1 % (ref 0–0.5)
INR PPP: 1.23 (ref 0.9–1.1)
LYMPHOCYTES # BLD AUTO: 2.03 10*3/MM3 (ref 0.7–3.1)
LYMPHOCYTES NFR BLD AUTO: 22.6 % (ref 19.6–45.3)
MCH RBC QN AUTO: 28 PG (ref 26.6–33)
MCHC RBC AUTO-ENTMCNC: 33.6 G/DL (ref 31.5–35.7)
MCV RBC AUTO: 83.2 FL (ref 79–97)
MONOCYTES # BLD AUTO: 1.13 10*3/MM3 (ref 0.1–0.9)
MONOCYTES NFR BLD AUTO: 12.6 % (ref 5–12)
NEUTROPHILS NFR BLD AUTO: 5.66 10*3/MM3 (ref 1.7–7)
NEUTROPHILS NFR BLD AUTO: 62.8 % (ref 42.7–76)
NRBC BLD AUTO-RTO: 0 /100 WBC (ref 0–0.2)
PLATELET # BLD AUTO: 273 10*3/MM3 (ref 140–450)
PMV BLD AUTO: 8.1 FL (ref 6–12)
POTASSIUM SERPL-SCNC: 3 MMOL/L (ref 3.5–5.2)
PROCALCITONIN SERPL-MCNC: 0.61 NG/ML (ref 0–0.25)
PROT SERPL-MCNC: 5.8 G/DL (ref 6–8.5)
PROTHROMBIN TIME: 15.3 SECONDS (ref 11.7–14.2)
RBC # BLD AUTO: 2.79 10*6/MM3 (ref 3.77–5.28)
SODIUM SERPL-SCNC: 135 MMOL/L (ref 136–145)
WBC # BLD AUTO: 9 10*3/MM3 (ref 3.4–10.8)

## 2021-05-29 PROCEDURE — 80053 COMPREHEN METABOLIC PANEL: CPT | Performed by: INTERNAL MEDICINE

## 2021-05-29 PROCEDURE — 63710000001 INSULIN LISPRO (HUMAN) PER 5 UNITS: Performed by: INTERNAL MEDICINE

## 2021-05-29 PROCEDURE — 85025 COMPLETE CBC W/AUTO DIFF WBC: CPT | Performed by: INTERNAL MEDICINE

## 2021-05-29 PROCEDURE — 82962 GLUCOSE BLOOD TEST: CPT

## 2021-05-29 PROCEDURE — 25010000002 CEFTRIAXONE PER 250 MG: Performed by: INTERNAL MEDICINE

## 2021-05-29 PROCEDURE — 85610 PROTHROMBIN TIME: CPT | Performed by: INTERNAL MEDICINE

## 2021-05-29 RX ORDER — POTASSIUM CHLORIDE 1.5 G/1.77G
40 POWDER, FOR SOLUTION ORAL AS NEEDED
Status: DISCONTINUED | OUTPATIENT
Start: 2021-05-29 | End: 2021-06-02 | Stop reason: HOSPADM

## 2021-05-29 RX ORDER — POTASSIUM CHLORIDE 7.45 MG/ML
10 INJECTION INTRAVENOUS
Status: DISCONTINUED | OUTPATIENT
Start: 2021-05-29 | End: 2021-06-02 | Stop reason: HOSPADM

## 2021-05-29 RX ORDER — WARFARIN SODIUM 10 MG/1
10 TABLET ORAL
Status: DISCONTINUED | OUTPATIENT
Start: 2021-05-29 | End: 2021-05-30 | Stop reason: DRUGHIGH

## 2021-05-29 RX ORDER — WARFARIN SODIUM 5 MG/1
5 TABLET ORAL
Status: DISCONTINUED | OUTPATIENT
Start: 2021-05-30 | End: 2021-06-01

## 2021-05-29 RX ORDER — POTASSIUM CHLORIDE 750 MG/1
40 TABLET, FILM COATED, EXTENDED RELEASE ORAL AS NEEDED
Status: DISCONTINUED | OUTPATIENT
Start: 2021-05-29 | End: 2021-06-02 | Stop reason: HOSPADM

## 2021-05-29 RX ADMIN — CEFTRIAXONE SODIUM 1 G: 1 INJECTION, SOLUTION INTRAVENOUS at 21:37

## 2021-05-29 RX ADMIN — POTASSIUM CHLORIDE 40 MEQ: 750 TABLET, EXTENDED RELEASE ORAL at 23:32

## 2021-05-29 RX ADMIN — AMLODIPINE BESYLATE 2.5 MG: 2.5 TABLET ORAL at 12:35

## 2021-05-29 RX ADMIN — POTASSIUM CHLORIDE 40 MEQ: 1.5 POWDER, FOR SOLUTION ORAL at 14:55

## 2021-05-29 RX ADMIN — SODIUM CHLORIDE, PRESERVATIVE FREE 10 ML: 5 INJECTION INTRAVENOUS at 21:38

## 2021-05-29 RX ADMIN — WARFARIN 10 MG: 10 TABLET ORAL at 17:39

## 2021-05-29 RX ADMIN — WARFARIN 5 MG: 5 TABLET ORAL at 01:28

## 2021-05-29 RX ADMIN — POTASSIUM CHLORIDE 40 MEQ: 1.5 POWDER, FOR SOLUTION ORAL at 19:01

## 2021-05-29 RX ADMIN — METFORMIN HYDROCHLORIDE 500 MG: 500 TABLET, EXTENDED RELEASE ORAL at 08:25

## 2021-05-29 RX ADMIN — SODIUM CHLORIDE, PRESERVATIVE FREE 10 ML: 5 INJECTION INTRAVENOUS at 08:25

## 2021-05-29 RX ADMIN — ATORVASTATIN CALCIUM 20 MG: 20 TABLET, FILM COATED ORAL at 08:25

## 2021-05-29 RX ADMIN — SODIUM CHLORIDE, PRESERVATIVE FREE 10 ML: 5 INJECTION INTRAVENOUS at 01:28

## 2021-05-29 RX ADMIN — INSULIN LISPRO 2 UNITS: 100 INJECTION, SOLUTION INTRAVENOUS; SUBCUTANEOUS at 17:39

## 2021-05-30 ENCOUNTER — APPOINTMENT (OUTPATIENT)
Dept: GENERAL RADIOLOGY | Facility: HOSPITAL | Age: 86
End: 2021-05-30

## 2021-05-30 LAB
ANION GAP SERPL CALCULATED.3IONS-SCNC: 8.7 MMOL/L (ref 5–15)
BASOPHILS # BLD AUTO: 0.07 10*3/MM3 (ref 0–0.2)
BASOPHILS NFR BLD AUTO: 0.7 % (ref 0–1.5)
BUN SERPL-MCNC: 10 MG/DL (ref 8–23)
BUN/CREAT SERPL: 22.2 (ref 7–25)
CALCIUM SPEC-SCNC: 9.5 MG/DL (ref 8.2–9.6)
CHLORIDE SERPL-SCNC: 105 MMOL/L (ref 98–107)
CO2 SERPL-SCNC: 24.3 MMOL/L (ref 22–29)
CREAT SERPL-MCNC: 0.45 MG/DL (ref 0.57–1)
DEPRECATED RDW RBC AUTO: 45.7 FL (ref 37–54)
EOSINOPHIL # BLD AUTO: 0.06 10*3/MM3 (ref 0–0.4)
EOSINOPHIL NFR BLD AUTO: 0.6 % (ref 0.3–6.2)
ERYTHROCYTE [DISTWIDTH] IN BLOOD BY AUTOMATED COUNT: 15.3 % (ref 12.3–15.4)
GFR SERPL CREATININE-BSD FRML MDRD: >150 ML/MIN/1.73
GLUCOSE BLDC GLUCOMTR-MCNC: 118 MG/DL (ref 70–130)
GLUCOSE BLDC GLUCOMTR-MCNC: 137 MG/DL (ref 70–130)
GLUCOSE BLDC GLUCOMTR-MCNC: 146 MG/DL (ref 70–130)
GLUCOSE BLDC GLUCOMTR-MCNC: 152 MG/DL (ref 70–130)
GLUCOSE SERPL-MCNC: 129 MG/DL (ref 65–99)
HCT VFR BLD AUTO: 24.7 % (ref 34–46.6)
HGB BLD-MCNC: 8.2 G/DL (ref 12–15.9)
IMM GRANULOCYTES # BLD AUTO: 0.09 10*3/MM3 (ref 0–0.05)
IMM GRANULOCYTES NFR BLD AUTO: 0.9 % (ref 0–0.5)
INR PPP: 1.85 (ref 0.9–1.1)
LYMPHOCYTES # BLD AUTO: 2.45 10*3/MM3 (ref 0.7–3.1)
LYMPHOCYTES NFR BLD AUTO: 24.4 % (ref 19.6–45.3)
MCH RBC QN AUTO: 27.3 PG (ref 26.6–33)
MCHC RBC AUTO-ENTMCNC: 33.2 G/DL (ref 31.5–35.7)
MCV RBC AUTO: 82.3 FL (ref 79–97)
MONOCYTES # BLD AUTO: 1.01 10*3/MM3 (ref 0.1–0.9)
MONOCYTES NFR BLD AUTO: 10.1 % (ref 5–12)
NEUTROPHILS NFR BLD AUTO: 6.35 10*3/MM3 (ref 1.7–7)
NEUTROPHILS NFR BLD AUTO: 63.3 % (ref 42.7–76)
NRBC BLD AUTO-RTO: 0 /100 WBC (ref 0–0.2)
PLATELET # BLD AUTO: 307 10*3/MM3 (ref 140–450)
PMV BLD AUTO: 7.9 FL (ref 6–12)
POTASSIUM SERPL-SCNC: 4.4 MMOL/L (ref 3.5–5.2)
PROTHROMBIN TIME: 21.1 SECONDS (ref 11.7–14.2)
RBC # BLD AUTO: 3 10*6/MM3 (ref 3.77–5.28)
SODIUM SERPL-SCNC: 138 MMOL/L (ref 136–145)
WBC # BLD AUTO: 10.03 10*3/MM3 (ref 3.4–10.8)

## 2021-05-30 PROCEDURE — 82962 GLUCOSE BLOOD TEST: CPT

## 2021-05-30 PROCEDURE — 71045 X-RAY EXAM CHEST 1 VIEW: CPT

## 2021-05-30 PROCEDURE — 85025 COMPLETE CBC W/AUTO DIFF WBC: CPT | Performed by: INTERNAL MEDICINE

## 2021-05-30 PROCEDURE — 85610 PROTHROMBIN TIME: CPT | Performed by: INTERNAL MEDICINE

## 2021-05-30 PROCEDURE — 80048 BASIC METABOLIC PNL TOTAL CA: CPT | Performed by: INTERNAL MEDICINE

## 2021-05-30 RX ORDER — AMOXICILLIN AND CLAVULANATE POTASSIUM 875; 125 MG/1; MG/1
1 TABLET, FILM COATED ORAL EVERY 12 HOURS SCHEDULED
Status: DISCONTINUED | OUTPATIENT
Start: 2021-05-30 | End: 2021-06-02 | Stop reason: HOSPADM

## 2021-05-30 RX ADMIN — SODIUM CHLORIDE, PRESERVATIVE FREE 10 ML: 5 INJECTION INTRAVENOUS at 21:16

## 2021-05-30 RX ADMIN — WARFARIN 5 MG: 5 TABLET ORAL at 18:05

## 2021-05-30 RX ADMIN — TRIAMTERENE AND HYDROCHLOROTHIAZIDE 1 TABLET: 37.5; 25 TABLET ORAL at 08:55

## 2021-05-30 RX ADMIN — AMLODIPINE BESYLATE 2.5 MG: 2.5 TABLET ORAL at 13:05

## 2021-05-30 RX ADMIN — ACETAMINOPHEN 650 MG: 325 TABLET, FILM COATED ORAL at 21:54

## 2021-05-30 RX ADMIN — ATORVASTATIN CALCIUM 20 MG: 20 TABLET, FILM COATED ORAL at 08:55

## 2021-05-30 RX ADMIN — AMOXICILLIN AND CLAVULANATE POTASSIUM 1 TABLET: 875; 125 TABLET, FILM COATED ORAL at 21:16

## 2021-05-30 RX ADMIN — ACETAMINOPHEN 650 MG: 325 TABLET, FILM COATED ORAL at 09:04

## 2021-05-30 RX ADMIN — SODIUM CHLORIDE, PRESERVATIVE FREE 10 ML: 5 INJECTION INTRAVENOUS at 08:55

## 2021-05-30 RX ADMIN — METFORMIN HYDROCHLORIDE 500 MG: 500 TABLET, EXTENDED RELEASE ORAL at 08:55

## 2021-05-30 RX ADMIN — ACETAMINOPHEN 650 MG: 325 TABLET, FILM COATED ORAL at 13:05

## 2021-05-31 LAB
ANION GAP SERPL CALCULATED.3IONS-SCNC: 7.1 MMOL/L (ref 5–15)
BACTERIA SPEC AEROBE CULT: ABNORMAL
BASOPHILS # BLD AUTO: 0.06 10*3/MM3 (ref 0–0.2)
BASOPHILS NFR BLD AUTO: 0.6 % (ref 0–1.5)
BUN SERPL-MCNC: 8 MG/DL (ref 8–23)
BUN/CREAT SERPL: 20.5 (ref 7–25)
CALCIUM SPEC-SCNC: 9.1 MG/DL (ref 8.2–9.6)
CHLORIDE SERPL-SCNC: 101 MMOL/L (ref 98–107)
CO2 SERPL-SCNC: 24.9 MMOL/L (ref 22–29)
CREAT SERPL-MCNC: 0.39 MG/DL (ref 0.57–1)
DEPRECATED RDW RBC AUTO: 44.8 FL (ref 37–54)
EOSINOPHIL # BLD AUTO: 0.19 10*3/MM3 (ref 0–0.4)
EOSINOPHIL NFR BLD AUTO: 2 % (ref 0.3–6.2)
ERYTHROCYTE [DISTWIDTH] IN BLOOD BY AUTOMATED COUNT: 15 % (ref 12.3–15.4)
GFR SERPL CREATININE-BSD FRML MDRD: >150 ML/MIN/1.73
GLUCOSE BLDC GLUCOMTR-MCNC: 117 MG/DL (ref 70–130)
GLUCOSE BLDC GLUCOMTR-MCNC: 119 MG/DL (ref 70–130)
GLUCOSE BLDC GLUCOMTR-MCNC: 120 MG/DL (ref 70–130)
GLUCOSE BLDC GLUCOMTR-MCNC: 168 MG/DL (ref 70–130)
GLUCOSE SERPL-MCNC: 110 MG/DL (ref 65–99)
HCT VFR BLD AUTO: 24.3 % (ref 34–46.6)
HGB BLD-MCNC: 8.1 G/DL (ref 12–15.9)
IMM GRANULOCYTES # BLD AUTO: 0.14 10*3/MM3 (ref 0–0.05)
IMM GRANULOCYTES NFR BLD AUTO: 1.5 % (ref 0–0.5)
INR PPP: 2.46 (ref 0.9–1.1)
LYMPHOCYTES # BLD AUTO: 2.61 10*3/MM3 (ref 0.7–3.1)
LYMPHOCYTES NFR BLD AUTO: 27.6 % (ref 19.6–45.3)
MCH RBC QN AUTO: 27.7 PG (ref 26.6–33)
MCHC RBC AUTO-ENTMCNC: 33.3 G/DL (ref 31.5–35.7)
MCV RBC AUTO: 83.2 FL (ref 79–97)
MONOCYTES # BLD AUTO: 0.86 10*3/MM3 (ref 0.1–0.9)
MONOCYTES NFR BLD AUTO: 9.1 % (ref 5–12)
NEUTROPHILS NFR BLD AUTO: 5.6 10*3/MM3 (ref 1.7–7)
NEUTROPHILS NFR BLD AUTO: 59.2 % (ref 42.7–76)
NRBC BLD AUTO-RTO: 0.1 /100 WBC (ref 0–0.2)
PLATELET # BLD AUTO: 332 10*3/MM3 (ref 140–450)
PMV BLD AUTO: 8 FL (ref 6–12)
POTASSIUM SERPL-SCNC: 3.8 MMOL/L (ref 3.5–5.2)
PROTHROMBIN TIME: 26.4 SECONDS (ref 11.7–14.2)
RBC # BLD AUTO: 2.92 10*6/MM3 (ref 3.77–5.28)
SODIUM SERPL-SCNC: 133 MMOL/L (ref 136–145)
WBC # BLD AUTO: 9.46 10*3/MM3 (ref 3.4–10.8)

## 2021-05-31 PROCEDURE — 85025 COMPLETE CBC W/AUTO DIFF WBC: CPT | Performed by: INTERNAL MEDICINE

## 2021-05-31 PROCEDURE — 25010000002 ONDANSETRON PER 1 MG: Performed by: INTERNAL MEDICINE

## 2021-05-31 PROCEDURE — 82962 GLUCOSE BLOOD TEST: CPT

## 2021-05-31 PROCEDURE — 80048 BASIC METABOLIC PNL TOTAL CA: CPT | Performed by: INTERNAL MEDICINE

## 2021-05-31 PROCEDURE — 85610 PROTHROMBIN TIME: CPT | Performed by: INTERNAL MEDICINE

## 2021-05-31 PROCEDURE — 63710000001 INSULIN LISPRO (HUMAN) PER 5 UNITS: Performed by: INTERNAL MEDICINE

## 2021-05-31 RX ADMIN — WARFARIN 5 MG: 5 TABLET ORAL at 17:33

## 2021-05-31 RX ADMIN — AMOXICILLIN AND CLAVULANATE POTASSIUM 1 TABLET: 875; 125 TABLET, FILM COATED ORAL at 08:58

## 2021-05-31 RX ADMIN — ONDANSETRON 4 MG: 2 INJECTION INTRAMUSCULAR; INTRAVENOUS at 13:58

## 2021-05-31 RX ADMIN — SODIUM CHLORIDE, PRESERVATIVE FREE 10 ML: 5 INJECTION INTRAVENOUS at 20:56

## 2021-05-31 RX ADMIN — FUROSEMIDE 20 MG: 20 TABLET ORAL at 08:57

## 2021-05-31 RX ADMIN — AMLODIPINE BESYLATE 2.5 MG: 2.5 TABLET ORAL at 11:31

## 2021-05-31 RX ADMIN — ATORVASTATIN CALCIUM 20 MG: 20 TABLET, FILM COATED ORAL at 08:58

## 2021-05-31 RX ADMIN — OXYCODONE AND ACETAMINOPHEN 1 TABLET: 7.5; 325 TABLET ORAL at 09:19

## 2021-05-31 RX ADMIN — TRIAMTERENE AND HYDROCHLOROTHIAZIDE 1 TABLET: 37.5; 25 TABLET ORAL at 08:57

## 2021-05-31 RX ADMIN — INSULIN LISPRO 2 UNITS: 100 INJECTION, SOLUTION INTRAVENOUS; SUBCUTANEOUS at 11:31

## 2021-05-31 RX ADMIN — AMOXICILLIN AND CLAVULANATE POTASSIUM 1 TABLET: 875; 125 TABLET, FILM COATED ORAL at 20:55

## 2021-05-31 RX ADMIN — METFORMIN HYDROCHLORIDE 500 MG: 500 TABLET, EXTENDED RELEASE ORAL at 08:58

## 2021-05-31 RX ADMIN — LOSARTAN POTASSIUM 50 MG: 50 TABLET, FILM COATED ORAL at 11:31

## 2021-06-01 LAB
ANION GAP SERPL CALCULATED.3IONS-SCNC: 9.1 MMOL/L (ref 5–15)
BASOPHILS # BLD AUTO: 0.07 10*3/MM3 (ref 0–0.2)
BASOPHILS NFR BLD AUTO: 0.6 % (ref 0–1.5)
BUN SERPL-MCNC: 8 MG/DL (ref 8–23)
BUN/CREAT SERPL: 18.2 (ref 7–25)
CALCIUM SPEC-SCNC: 9 MG/DL (ref 8.2–9.6)
CHLORIDE SERPL-SCNC: 98 MMOL/L (ref 98–107)
CO2 SERPL-SCNC: 25.9 MMOL/L (ref 22–29)
CREAT SERPL-MCNC: 0.44 MG/DL (ref 0.57–1)
DEPRECATED RDW RBC AUTO: 44.9 FL (ref 37–54)
EOSINOPHIL # BLD AUTO: 0.19 10*3/MM3 (ref 0–0.4)
EOSINOPHIL NFR BLD AUTO: 1.7 % (ref 0.3–6.2)
ERYTHROCYTE [DISTWIDTH] IN BLOOD BY AUTOMATED COUNT: 14.9 % (ref 12.3–15.4)
GFR SERPL CREATININE-BSD FRML MDRD: >150 ML/MIN/1.73
GLUCOSE BLDC GLUCOMTR-MCNC: 111 MG/DL (ref 70–130)
GLUCOSE BLDC GLUCOMTR-MCNC: 135 MG/DL (ref 70–130)
GLUCOSE BLDC GLUCOMTR-MCNC: 152 MG/DL (ref 70–130)
GLUCOSE BLDC GLUCOMTR-MCNC: 89 MG/DL (ref 70–130)
GLUCOSE SERPL-MCNC: 95 MG/DL (ref 65–99)
HCT VFR BLD AUTO: 25 % (ref 34–46.6)
HGB BLD-MCNC: 8.4 G/DL (ref 12–15.9)
IMM GRANULOCYTES # BLD AUTO: 0.15 10*3/MM3 (ref 0–0.05)
IMM GRANULOCYTES NFR BLD AUTO: 1.4 % (ref 0–0.5)
INR PPP: 3 (ref 0.9–1.1)
LYMPHOCYTES # BLD AUTO: 2.94 10*3/MM3 (ref 0.7–3.1)
LYMPHOCYTES NFR BLD AUTO: 26.8 % (ref 19.6–45.3)
MCH RBC QN AUTO: 27.8 PG (ref 26.6–33)
MCHC RBC AUTO-ENTMCNC: 33.6 G/DL (ref 31.5–35.7)
MCV RBC AUTO: 82.8 FL (ref 79–97)
MONOCYTES # BLD AUTO: 0.84 10*3/MM3 (ref 0.1–0.9)
MONOCYTES NFR BLD AUTO: 7.7 % (ref 5–12)
NEUTROPHILS NFR BLD AUTO: 6.76 10*3/MM3 (ref 1.7–7)
NEUTROPHILS NFR BLD AUTO: 61.8 % (ref 42.7–76)
NRBC BLD AUTO-RTO: 0.1 /100 WBC (ref 0–0.2)
PLATELET # BLD AUTO: 384 10*3/MM3 (ref 140–450)
PMV BLD AUTO: 8 FL (ref 6–12)
POTASSIUM SERPL-SCNC: 3.4 MMOL/L (ref 3.5–5.2)
PROTHROMBIN TIME: 30.8 SECONDS (ref 11.7–14.2)
RBC # BLD AUTO: 3.02 10*6/MM3 (ref 3.77–5.28)
SODIUM SERPL-SCNC: 133 MMOL/L (ref 136–145)
WBC # BLD AUTO: 10.95 10*3/MM3 (ref 3.4–10.8)

## 2021-06-01 PROCEDURE — 63710000001 INSULIN LISPRO (HUMAN) PER 5 UNITS: Performed by: INTERNAL MEDICINE

## 2021-06-01 PROCEDURE — 82962 GLUCOSE BLOOD TEST: CPT

## 2021-06-01 PROCEDURE — 85610 PROTHROMBIN TIME: CPT | Performed by: INTERNAL MEDICINE

## 2021-06-01 PROCEDURE — 85025 COMPLETE CBC W/AUTO DIFF WBC: CPT | Performed by: INTERNAL MEDICINE

## 2021-06-01 PROCEDURE — 80048 BASIC METABOLIC PNL TOTAL CA: CPT | Performed by: INTERNAL MEDICINE

## 2021-06-01 RX ORDER — AMOXICILLIN AND CLAVULANATE POTASSIUM 875; 125 MG/1; MG/1
1 TABLET, FILM COATED ORAL EVERY 12 HOURS SCHEDULED
Qty: 10 TABLET | Refills: 0 | Status: SHIPPED | OUTPATIENT
Start: 2021-06-01 | End: 2021-06-06

## 2021-06-01 RX ORDER — WARFARIN SODIUM 2.5 MG/1
2.5 TABLET ORAL
Status: COMPLETED | OUTPATIENT
Start: 2021-06-01 | End: 2021-06-01

## 2021-06-01 RX ORDER — WARFARIN SODIUM 5 MG/1
5 TABLET ORAL
Status: DISCONTINUED | OUTPATIENT
Start: 2021-06-02 | End: 2021-06-02 | Stop reason: HOSPADM

## 2021-06-01 RX ORDER — SIMVASTATIN 20 MG
20 TABLET ORAL NIGHTLY
Qty: 30 TABLET | Refills: 0 | Status: SHIPPED | OUTPATIENT
Start: 2021-06-01

## 2021-06-01 RX ADMIN — AMOXICILLIN AND CLAVULANATE POTASSIUM 1 TABLET: 875; 125 TABLET, FILM COATED ORAL at 20:16

## 2021-06-01 RX ADMIN — LOSARTAN POTASSIUM 50 MG: 50 TABLET, FILM COATED ORAL at 10:39

## 2021-06-01 RX ADMIN — ATORVASTATIN CALCIUM 20 MG: 20 TABLET, FILM COATED ORAL at 08:48

## 2021-06-01 RX ADMIN — AMLODIPINE BESYLATE 2.5 MG: 2.5 TABLET ORAL at 10:39

## 2021-06-01 RX ADMIN — POTASSIUM CHLORIDE 40 MEQ: 1.5 POWDER, FOR SOLUTION ORAL at 15:26

## 2021-06-01 RX ADMIN — AMOXICILLIN AND CLAVULANATE POTASSIUM 1 TABLET: 875; 125 TABLET, FILM COATED ORAL at 08:48

## 2021-06-01 RX ADMIN — WARFARIN 2.5 MG: 5 TABLET ORAL at 18:08

## 2021-06-01 RX ADMIN — INSULIN LISPRO 2 UNITS: 100 INJECTION, SOLUTION INTRAVENOUS; SUBCUTANEOUS at 18:08

## 2021-06-01 RX ADMIN — METFORMIN HYDROCHLORIDE 500 MG: 500 TABLET, EXTENDED RELEASE ORAL at 08:48

## 2021-06-01 RX ADMIN — POTASSIUM CHLORIDE 40 MEQ: 750 TABLET, EXTENDED RELEASE ORAL at 10:39

## 2021-06-01 RX ADMIN — TRIAMTERENE AND HYDROCHLOROTHIAZIDE 1 TABLET: 37.5; 25 TABLET ORAL at 08:48

## 2021-06-01 RX ADMIN — SODIUM CHLORIDE, PRESERVATIVE FREE 10 ML: 5 INJECTION INTRAVENOUS at 08:48

## 2021-06-01 NOTE — NURSING NOTE
CWOCN- patient has skin breakdown to the right heel- hard, intact black eschar. Daughter reports that she has also had an area on her left heel prior. Today there are some darker areas on the heel but they are soft.   Debridement is not indicated for patient. Recommend to keep the skin soft and prevent the skin or eschar from cracking which could lead to open wounds and bacteria entry. Patient is being discharged, per daughter. Recommend to apply aquaphor or vaseline to BLE and feet daily. Daughter verbalized understanding. This is what they have been doing at home. She also understands elevating heels off bed. Patient has heel protective boots.

## 2021-06-01 NOTE — PROGRESS NOTES
"Pharmacy Consult: Warfarin Dosing/ Monitoring    Rebecca Peterson is a 89 y.o. female, estimated creatinine clearance is 39.5 mL/min (A) (by C-G formula based on SCr of 0.44 mg/dL (L)). weighing 65.4 kg (144 lb 3.2 oz).     has a past medical history of Acid reflux, Cataract, Dependent edema, Diabetes mellitus (CMS/HCC), Hyperlipidemia, Hypertension, and Osteoarthritis.    Social History     Tobacco Use   • Smoking status: Former Smoker     Packs/day: 0.25     Types: Cigarettes   • Smokeless tobacco: Never Used   • Tobacco comment: \"QUIT YEARS AGO\"   Substance Use Topics   • Alcohol use: No   • Drug use: No       Results from last 7 days   Lab Units 06/01/21  0500 05/31/21  0255 05/30/21  0514 05/29/21  0530 05/28/21  2252   INR  3.00* 2.46* 1.85* 1.23* 1.16*   HEMOGLOBIN g/dL 8.4* 8.1* 8.2* 7.8*  --    HEMATOCRIT % 25.0* 24.3* 24.7* 23.2*  --    PLATELETS 10*3/mm3 384 332 307 273  --      Results from last 7 days   Lab Units 06/01/21  0500 05/31/21  0255 05/30/21  0514 05/29/21  0530   SODIUM mmol/L 133* 133* 138 135*   POTASSIUM mmol/L 3.4* 3.8 4.4 3.0*   CHLORIDE mmol/L 98 101 105 100   CO2 mmol/L 25.9 24.9 24.3 22.9   BUN mg/dL 8 8 10 17   CREATININE mg/dL 0.44* 0.39* 0.45* 0.44*   CALCIUM mg/dL 9.0 9.1 9.5 9.3   BILIRUBIN mg/dL  --   --   --  0.5   ALK PHOS U/L  --   --   --  58   ALT (SGPT) U/L  --   --   --  7   AST (SGOT) U/L  --   --   --  13   GLUCOSE mg/dL 95 110* 129* 112*     Anticoagulation history: 5mg daily    Hospital Anticoagulation:  Consulting provider: Dr Zimmerman  Start date: 5/28  Indication: h/o TIA  Target INR: 2-3  Expected duration: indefinite   Bridge Therapy: No                Date 5/28 5/29 5/30 5/31 6/1        INR 1.16 1.23 1.85 2.46 3.0        Warfarin dose 5mg 10mg 5mg 5mg 2.5mg          Potential drug interactions: augment    Relevant nutrition status: reg cardiac diet + Boost    Other:     Education complete?/ Date:     Assessment/Plan:  Dose Will reduce dose to 2.5mg for today and then " resume 5mg daily  Monitor INR daily  Follow up daily    Pharmacy will continue to follow until discharge or discontinuation of warfarin.   Dominic AlyD

## 2021-06-01 NOTE — DISCHARGE SUMMARY
Date of Admission: 5/28/2021  Date of Discharge:  6/2/2021  Primary Care Physician: Pollo Dahl MD     Discharge Diagnosis:  Active Hospital Problems    Diagnosis  POA   • **Acute urinary retention [R33.8]  Yes   • Prolonged immobilization [Z74.09]  Yes   • Left knee pain [M25.562]  Yes   • Anemia [D64.9]  Yes   • UTI (urinary tract infection) [N39.0]  Yes   • History of COVID-19- 3/2021 [Z86.16]  Yes   • Type 2 diabetes mellitus (CMS/[facility]) [E11.9]  Yes   • Hypertension [I10]  Yes      Resolved Hospital Problems   No resolved problems to display.       Presenting Problem/History of Present Illness:  Acute urinary retention [R33.8]   Patient is a 89-year-old female who has complicated past medical history including osteoarthritis of her hands and knees, history of rheumatoid arthritis, history of recent COVID-19 infection resulting in hospitalization and subsequent prolonged stay at the rehabilitation facility, history of decreased mobility and recurrent falls with injury to the left knee and barely ambulating since March with at home physical therapy ongoing presents to the [facility] emergency room when she was taken there by her daughter for 2 to 3 days history of abdominal and back pain discomfort with abdominal distention.  Patient was also not eating and drinking much.  Evaluation at the [facility] revealed mild leukocytosis and evidence of urinary retention on CT scan of the lumbar spine with bilateral hydronephrosis.  Patient was provided with Steiner catheter with removal of about 2 L of urine with improvement in her symptoms and resolution of abdominal distention.  Because of the acute urinary retention [facility] emergency room provider thought that patient would benefit with hospitalization and evaluation by urology service.  Patient is also noted to have abnormal urinalysis consistent with urinary tract infection.  Patient was sent here with Steiner catheter in place.  Patient does have  history of chronic anticoagulation for previous TIAs with no data available as far as her latest INR is concerned.    Hospital Course:  The patient is a 89 y.o. female who presented with UTI acute urinary retention and bilateral hydronephrosis.  She was admitted Steiner was placed and urology was consulted.  Her urine culture grew Enterococcus which was pansensitive and she was adjusted to Augmentin to complete antibiotic course.  She is going to keep her Steiner in place and follow-up with urology in clinic for voiding trial there.  She has chronic osteoarthritis degenerative disc disease of the spine and underlying history of rheumatoid arthritis.  She has diabetes hypertension anemia hypokalemia and chronic immobility.  Mental status is about at baseline.  Simvastatin dose was decreased due to con commitment amlodipine therapy.  She also did require some potassium supplementation while here.  I am not entirely certain if this is from postobstructive symptoms.  Even though she is on Lasix she is also on triamterene and an ARB so had not been taking any potassium supplementation.  Would recommend that she have a repeat BMP in about 1 week to monitor her renal function and potassium levels.  She needs to follow-up with primary care for additional adjustments of her medications.    Pharmacy has given patient instructions for coumadin dosing. She needs to follow up with pcp about INR testing and titration.    Exam Today:  General AA NAD, chronically ill-appearing  HEENT NCAT no scleral icterus  CV RRR  Lung decreased breath sounds but no acute wheezes or rales  Abdomen ND NT  Extremity chronic bilateral lower extremity edema present, RA changes  Neuro near baseline per family  Psych normal mood affect    Results:  CXR  No evidence of acute disease within the chest.    Procedures Performed:         Consults:   Consults     Date and Time Order Name Status Description    5/28/2021  9:25 PM Inpatient Urology Consult Completed             Discharge Disposition:  Home or Self Care    Discharge Medications:     Discharge Medications      New Medications      Instructions Start Date   amoxicillin-clavulanate 875-125 MG per tablet  Commonly known as: AUGMENTIN   1 tablet, Oral, Every 12 Hours Scheduled         Changes to Medications      Instructions Start Date   metFORMIN  MG 24 hr tablet  Commonly known as: GLUCOPHAGE-XR  What changed: Another medication with the same name was removed. Continue taking this medication, and follow the directions you see here.   No dose, route, or frequency recorded.      simvastatin 20 MG tablet  Commonly known as: ZOCOR  What changed:   · medication strength  · how much to take   20 mg, Oral, Nightly      triamterene-hydrochlorothiazide 37.5-25 MG per capsule  Commonly known as: DYAZIDE  What changed: Another medication with the same name was removed. Continue taking this medication, and follow the directions you see here.   No dose, route, or frequency recorded.         Continue These Medications      Instructions Start Date   acetaminophen 325 MG tablet  Commonly known as: TYLENOL   650 mg, Oral, Every 4 Hours PRN      amLODIPine 2.5 MG tablet  Commonly known as: NORVASC   2.5 mg, Oral, Daily With Lunch      bisacodyl 10 MG suppository  Commonly known as: DULCOLAX   10 mg, Rectal, Daily PRN      furosemide 20 MG tablet  Commonly known as: LASIX   20 mg, Oral, Daily (Monday-Friday), Monday, Wednesday and Friday       losartan 100 MG tablet  Commonly known as: COZAAR   50 mg, Oral, Daily With Lunch      oxyCODONE-acetaminophen 7.5-325 MG per tablet  Commonly known as: PERCOCET   Take 1-2 tabs po q 4 hours prn pain      pantoprazole 20 MG EC tablet  Commonly known as: PROTONIX   20 mg, Oral, As Needed      sennosides-docusate 8.6-50 MG per tablet  Commonly known as: PERICOLACE   2 tablets, Oral, 2 Times Daily      warfarin 5 MG tablet  Commonly known as: COUMADIN   5 mg, Oral, Daily Warfarin              Discharge Diet:   Diet Instructions     Diet: Consistent Carbohydrate      Discharge Diet: Consistent Carbohydrate          Activity at Discharge:   Activity Instructions     Activity as Tolerated            Follow-up Appointments:  Additional Instructions for the Follow-ups that You Need to Schedule     Ambulatory Referral to Home Health   As directed      Face to Face Visit Date: 6/1/2021    Follow-up provider for Plan of Care?: I treated the patient in an acute care facility and will not continue treatment after discharge.    Follow-up provider: LA DAHL [[id number]]    Reason/Clinical Findings: weakness    Describe mobility limitations that make leaving home difficult: see above    Nursing/Therapeutic Services Requested: Physical Therapy Occupational Therapy    PT orders: Strengthening Therapeutic exercise    Occupational orders: Strengthening Activities of daily living    Frequency: 1 Week 1            Contact information for follow-up providers     La Dahl MD Follow up.    Specialty: Family Medicine  Contact information:  [address] [facility] [address]  [facility] [address] [phone]             Cony Sparks APRN Follow up.    Specialty: Nurse Practitioner  Contact information:  [address]  [address]  [facility] [id number] [phone]                   Contact information for after-discharge care     Home Medical Care     [facility] .    Service: Home Health Services  Contact information:  [address]  [facility] [address] [phone]                             Test Results Pending at Discharge:       Joshua Garcia MD  06/02/21  11:48 EDT    Time Spent on Discharge Activities: >30 minutes    Dictated portions using Dragon dictation software.  During the entire encounter, I was wearing recommended PPE including face mask and eye protection. Hand sanitization was performed prior to entering room and upon exit.

## 2021-06-01 NOTE — CASE MANAGEMENT/SOCIAL WORK
"Physicians Statement of Medical Necessity for  Ambulance Transportation    GENERAL INFORMATION     Name: Rebecca Peterson  YOB: 1931  Medicare #: [id number], [id number]  Transport Date: 6/1/2021  (Valid for round trips this date, or for scheduled repetitive trips for 60 days from the date signed below.)  Origin: [facility]  Destination: [place] [address]  Is the Patient's stay covered under Medicare Part A (PPS/DRG?)Yes  Closest appropriate facility? Yes  If this a hosp-hosp transfer? No  Is this a hospice patient? No    MEDICAL NECESSITY QUESTIONAIRE    Ambulance Transportation is medically necessary only if other means of transportation are contraindicated or would be potentially harmful to the patient.  To meet this requirement, the patient must be either \"bed confined\" or suffer from a condition such that transport by means other than an ambulance is contraindicated by the patient's condition.  The following questions must be answered by the healthcare professional signing below for this form to be valid:     1) Describe the MEDICAL CONDITION (physical and/or mental) of this patient AT THE TIME OF AMBULANCE TRANSPORT that requires the patient to be transported in an ambulance, and why transport by other means is contraindicated by the patient's condition:   Patient Active Problem List   Diagnosis   • Primary osteoarthritis of right knee   • Hypertension   • Type 2 diabetes mellitus (CMS/HCC)   • Cardiac murmur   • Dependent edema   • Postoperative anemia due to acute blood loss   • Status post total right knee replacement   • Acute urinary retention   • Prolonged immobilization   • Left knee pain   • Anemia   • UTI (urinary tract infection)   • History of COVID-19- 3/2021       Past Medical History:   Diagnosis Date   • Acid reflux    • Cataract     BILATERAL   • Dependent edema    • Diabetes mellitus (CMS/HCC)    • Hyperlipidemia    • " "Hypertension    • Osteoarthritis       Past Surgical History:   Procedure Laterality Date   • ANKLE SURGERY Right    • HYSTERECTOMY     • TOTAL KNEE ARTHROPLASTY Right 3/21/2017    Procedure: TOTAL KNEE ARTHROPLASTY ;  Surgeon: Eleazar Head MD;  Location: [facility];  Service:       2) Is this patient \"bed confined\" as defined below?Yes   To be \"bed confined\" the patient must satisfy all three of the following criteria:  (1) unable to get up from bed without assistance; AND (2) unable to ambulate;  AND (3) unable to sit in a chair or wheelchair.  3) Can this patient safely be transported by car or wheelchair van (I.e., may safely sit during transport, without an attendant or monitoring?)No   4. In addition to completing questions 1-3 above, please check any of the following conditions that apply*:          *Note: supporting documentation for any boxes checked must be maintained in the patient's medical records Patient is confused and Unable to sit in a chair or wheelchair due to decubitus ulcers or other wounds      SIGNATURE OF PHYSICIAN OR OTHER AUTHORIZED HEALTHCARE PROFESSIONAL    I certify that the above information is true and correct based on my evaluation of this patient, and represent that the patient requires transport by ambulance and that other forms of transport are contraindicated.  I understand that this information will be used by the Centers for Medicare and Medicaid Services (CMS) to support the determiniation of medical necessity for ambulance services, and I represent that I have personal knowledge of the patient's condition at the time of transport.       If this box is checked, I also certify that the patient is physically or mentally incapable of signing the ambulance service's claim form and that the institution with which I am affiliated has furnished care, services or assistance to the patient.  My signature below is made on behalf of the patient pursuant to 42 .36(b)(4). In " accordance with 42 .37, the specific reason(s) that the patient is physically or mentally incapable of signing the claim for is as follows:     Signature of Physician or Healthcare Professional  Date/Time:   6/1/2021 14:26 EDT       (For Scheduled repetitive transport, this form is not valid for transports performed more than 60 days after this date).                                         Raina Lombardi RN                                                                                                     --------------------------------------------------------------------------------------------  Printed Name and Credentials of Physician or Authorized Healthcare Professional     *Form must be signed by patient's attending physician for scheduled, repetitive transports,.  For non-repetitive ambulance transports, if unable to obtain the signature of the attending physician, any of the following may sign (please select below):     Physician  Clinical Nurse Specialist  Registered Nurse     Physician Assistant  Discharge Planner  Licensed Practical Nurse     Nurse Practitioner

## 2021-06-01 NOTE — PLAN OF CARE
Goal Outcome Evaluation:  Plan of Care Reviewed With: patient  Progress: improving  Outcome Summary: No c/o pain. VSS on roomair. F/c in place. Repositioned for comfort. Daughter at bedside.Currently resting in bed. Will continue to monitor.

## 2021-06-01 NOTE — CASE MANAGEMENT/SOCIAL WORK
Discharge Planning Assessment  [facility]     Patient Name: Rebecca Peterson  MRN: [id number]  Today's Date: 6/1/2021    Admit Date: 5/28/2021    Discharge Needs Assessment     Row Name 06/01/21 1421       Living Environment    Lives With  child(abner), adult    Current Living Arrangements  home/apartment/condo    Primary Care Provided by  child(abner)    Provides Primary Care For  no one, unable/limited ability to care for self    Family Caregiver if Needed  child(abner), adult    Quality of Family Relationships  helpful;involved;supportive    Able to Return to Prior Arrangements  yes       Resource/Environmental Concerns    Resource/Environmental Concerns  none    Transportation Concerns  car, none       Transition Planning    Patient/Family Anticipates Transition to  home with help/services;home with family    Patient/Family Anticipated Services at Transition  home health care    Transportation Anticipated  health plan transportation       Discharge Needs Assessment    Readmission Within the Last 30 Days  no previous admission in last 30 days    Equipment Currently Used at Home  hospital bed;walker, rolling;commode    Concerns to be Addressed  discharge planning    Anticipated Changes Related to Illness  none    Equipment Needed After Discharge  none    Current Discharge Risk  chronically ill;cognitively impaired;dependent with mobility/activities of daily living        Discharge Plan     Row Name 06/01/21 5353       Plan    Plan  Home with [facility] and family via EMS    Provided Post Acute Provider List?  N/A    Patient/Family in Agreement with Plan  yes    Plan Comments  Spoke with patient’s daughter Reta Titus ([phone]) at bedside, introduced self and explained CCP role. Verified face sheet and confirmed local pharmacy is [facility] in [place]. Daughter states PCP is Dr. Pollo Dahl. Pt lives at home with her daughters. Home has 1 steps to enter and bedroom on the first floor. Prior to admission pt was bed  bound and stays in her hospital bed the majority of the time. The daughters report they have a hospital bed, bedside commode and wc. Pt uses no respiratory devices. Dtr states pt has used [facility] and they wish to have them return at [place]. CCP explained would make referral and ask MD for order. Daughter confirms pt will need EMS Transport. CCP explained hospital unable to guarantee insurance coverage for EMS use and could get a bill. Offered info regarding private pay stretcher transport. Reta requests EMS setup. CCP called Stephanie/[facility] and she states pt is current and will follow for [place] later today . Teagan COTE/ANGELA        Continued Care and Services - Admitted Since 5/28/2021     Home Medical Care     Service Provider Request Status Selected Services Address Phone Fax Patient Preferred    [facility] [place]  Accepted N/A [address] [facility] [address] [phone] --                Demographic Summary     Row Name 06/01/21 1421       General Information    Referral Source  admission list    Reason for Consult  discharge planning    Preferred Language  English     Used During This Interaction  no       Contact Information    Permission Granted to Share Info With  family/designee        Functional Status    No documentation.       Psychosocial    No documentation.       Abuse/Neglect    No documentation.       Legal    No documentation.       Substance Abuse    No documentation.       Patient Forms    No documentation.           Raina Lombardi RN

## 2021-06-01 NOTE — PLAN OF CARE
Goal Outcome Evaluation:  Plan of Care Reviewed With: patient, daughter  Progress: improving     Alert and disoriented. On room air. Frequent repositioning. Steiner catheter care education done with daughter at bedside. Teach back demonstrated understanding. Demonstrated catheter care and emptying of system correctly. Wound care completed. No falls. Oral antibiotics given for UTI. Ready for discharge. EMS scheduled for 12:00 tomorrow. Will CTM.

## 2021-06-01 NOTE — DISCHARGE PLACEMENT REQUEST
"Brady Peterson (89 y.o. Female)     Date of Birth Social Security Number Address Home Phone MRN    05/18/1931  [address] [address] [phone] [id number]    Congregation Marital Status          None        Admission Date Admission Type Admitting Provider Attending Provider Department, Room/Bed    5/28/21 Urgent Tonio Zimmerman MD Baumann, Patrick D, MD [address], [address]    Discharge Date Discharge Disposition Discharge Destination                       Attending Provider: Joshua Garcia MD    Allergies: Rofecoxib    Isolation: None   Infection: None   Code Status: CPR    Ht: 152.4 cm (60\")   Wt: 65.4 kg (144 lb 3.2 oz)    Admission Cmt: None   Principal Problem: Acute urinary retention [R33.8]                 Active Insurance as of 5/28/2021     Primary Coverage     Payor Plan Insurance Group Employer/Plan Group    MEDICARE MEDICARE A & B      Payor Plan Address Payor Plan Phone Number Payor Plan Fax Number Effective Dates    [address] [phone]  5/1/1996 - None Entered    [facility] [id number]       Subscriber Name Subscriber Birth Date Member ID       BRADY PETERSON 5/18/1931 [id number]           Secondary Coverage     Payor Plan Insurance Group Employer/Plan Group    AETNA COMMERCIAL AETNA  [employer] SU[employer]      Payor Plan Address Payor Plan Phone Number Payor Plan Fax Number Effective Dates    [address] [phone]  11/5/2018 - None Entered    [facility] [id number]       Subscriber Name Subscriber Birth Date Member ID       BRADY PETERSON 5/18/1931 [id number]                 Emergency Contacts      (Rel.) Home Phone Work Phone Mobile Phone    Shiloh Sheffield (Daughter) [phone] -- [phone]    Reta Titus (Daughter) -- -- [phone]              "

## 2021-06-02 VITALS
RESPIRATION RATE: 16 BRPM | HEIGHT: 60 IN | DIASTOLIC BLOOD PRESSURE: 54 MMHG | HEART RATE: 76 BPM | OXYGEN SATURATION: 100 % | SYSTOLIC BLOOD PRESSURE: 107 MMHG | WEIGHT: 144.2 LBS | BODY MASS INDEX: 28.31 KG/M2 | TEMPERATURE: 97.7 F

## 2021-06-02 LAB
GLUCOSE BLDC GLUCOMTR-MCNC: 114 MG/DL (ref 70–130)
GLUCOSE BLDC GLUCOMTR-MCNC: 140 MG/DL (ref 70–130)
INR PPP: 3.99 (ref 0.9–1.1)
PROTHROMBIN TIME: 38.7 SECONDS (ref 11.7–14.2)

## 2021-06-02 PROCEDURE — 85610 PROTHROMBIN TIME: CPT | Performed by: INTERNAL MEDICINE

## 2021-06-02 PROCEDURE — 82962 GLUCOSE BLOOD TEST: CPT

## 2021-06-02 RX ADMIN — TRIAMTERENE AND HYDROCHLOROTHIAZIDE 1 TABLET: 37.5; 25 TABLET ORAL at 07:59

## 2021-06-02 RX ADMIN — AMOXICILLIN AND CLAVULANATE POTASSIUM 1 TABLET: 875; 125 TABLET, FILM COATED ORAL at 07:58

## 2021-06-02 RX ADMIN — FUROSEMIDE 20 MG: 20 TABLET ORAL at 07:58

## 2021-06-02 RX ADMIN — AMLODIPINE BESYLATE 2.5 MG: 2.5 TABLET ORAL at 11:42

## 2021-06-02 RX ADMIN — ATORVASTATIN CALCIUM 20 MG: 20 TABLET, FILM COATED ORAL at 08:08

## 2021-06-02 RX ADMIN — LOSARTAN POTASSIUM 50 MG: 50 TABLET, FILM COATED ORAL at 11:42

## 2021-06-02 RX ADMIN — METFORMIN HYDROCHLORIDE 500 MG: 500 TABLET, EXTENDED RELEASE ORAL at 07:58

## 2021-06-02 NOTE — PLAN OF CARE
Goal Outcome Evaluation:  Plan of Care Reviewed With: patient, daughter  Progress: improving     EMS scheduled at 12:00

## 2021-06-02 NOTE — PROGRESS NOTES
"Addendum:  Spoke with daughter and instructed her to hold today's dose of warfarin and then give 2.5mg daily x 4 days while pt is on Augmentin. After that resume normal home regimen.  Dominic Young PharmD    Pharmacy Consult: Warfarin Dosing/ Monitoring    Rebecca Peterson is a 89 y.o. female, estimated creatinine clearance is 39.5 mL/min (A) (by C-G formula based on SCr of 0.44 mg/dL (L)). weighing 65.4 kg (144 lb 3.2 oz).     has a past medical history of Acid reflux, Cataract, Dependent edema, Diabetes mellitus (CMS/HCC), Hyperlipidemia, Hypertension, and Osteoarthritis.    Social History     Tobacco Use   • Smoking status: Former Smoker     Packs/day: 0.25     Types: Cigarettes   • Smokeless tobacco: Never Used   • Tobacco comment: \"QUIT YEARS AGO\"   Substance Use Topics   • Alcohol use: No   • Drug use: No       Results from last 7 days   Lab Units 06/02/21  0607 06/01/21  0500 05/31/21  0255 05/30/21  0514 05/29/21  0530 05/28/21  2252   INR  3.99* 3.00* 2.46* 1.85* 1.23* 1.16*   HEMOGLOBIN g/dL  --  8.4* 8.1* 8.2* 7.8*  --    HEMATOCRIT %  --  25.0* 24.3* 24.7* 23.2*  --    PLATELETS 10*3/mm3  --  384 332 307 273  --      Results from last 7 days   Lab Units 06/01/21  0500 05/31/21  0255 05/30/21  0514 05/29/21  0530   SODIUM mmol/L 133* 133* 138 135*   POTASSIUM mmol/L 3.4* 3.8 4.4 3.0*   CHLORIDE mmol/L 98 101 105 100   CO2 mmol/L 25.9 24.9 24.3 22.9   BUN mg/dL 8 8 10 17   CREATININE mg/dL 0.44* 0.39* 0.45* 0.44*   CALCIUM mg/dL 9.0 9.1 9.5 9.3   BILIRUBIN mg/dL  --   --   --  0.5   ALK PHOS U/L  --   --   --  58   ALT (SGPT) U/L  --   --   --  7   AST (SGOT) U/L  --   --   --  13   GLUCOSE mg/dL 95 110* 129* 112*     Anticoagulation history: 5mg daily     Hospital Anticoagulation:  Consulting provider: Dr Zimmerman  Start date: 5/28  Indication: h/o TIA  Target INR: 2-3  Expected duration: indefinite   Bridge Therapy: No                Date 5/28 5/29 5/30 5/31 6/1 6/2       INR 1.16 1.23 1.85 2.46 3.0 3.99     "   Warfarin dose 5mg 10mg 5mg 5mg 2.5mg hold         Potential drug interactions: augmentin    Relevant nutrition status: reg cardiac diet + Boost    Other:     Education complete?/ Date:     Assessment/Plan:  Dose: would recommend holding today's dose, may want to lower dose of warfarin to 2.5mg daily while on Augmentin  Monitor INR daily  Follow up daily    Pharmacy will continue to follow until discharge or discontinuation of warfarin.   Dominic AlyD

## 2021-06-02 NOTE — PROGRESS NOTES
Name: Rebecca Peterson ADMIT: 2021   : 1931  PCP: Pollo Dahl MD    MRN: [id number] LOS: 5 days   AGE/SEX: 89 y.o. female  ROOM: [facility]   Subjective   No chief complaint on file.     No CP SOA NVD    Objective   Vital Signs  Temp:  [97.6 °F (36.4 °C)-98.3 °F (36.8 °C)] 97.7 °F (36.5 °C)  Heart Rate:  [67-83] 76  Resp:  [16] 16  BP: ()/(43-68) 107/54  SpO2:  [98 %-100 %] 100 %  on   ;   Device (Oxygen Therapy): room air  Body mass index is 28.16 kg/m².    Physical Exam  General AA NAD, chronically ill-appearing  HEENT NCAT no scleral icterus  CV RRR  Lung decreased breath sounds but no acute wheezes or rales  Abdomen ND NT  Extremity chronic bilateral lower extremity edema present, RA changes  Neuro near baseline per family  Psych normal mood affect    Results Review:       I reviewed the patient's new clinical results.      Results from last 7 days   Lab Units 21  0500 21  0255 21  0514 21  0530   WBC 10*3/mm3 10.95* 9.46 10.03 9.00   HEMOGLOBIN g/dL 8.4* 8.1* 8.2* 7.8*   PLATELETS 10*3/mm3 384 332 307 273     Results from last 7 days   Lab Units 21  0500 21  0255 21  0514 21  0530   SODIUM mmol/L 133* 133* 138 135*   POTASSIUM mmol/L 3.4* 3.8 4.4 3.0*   CHLORIDE mmol/L 98 101 105 100   CO2 mmol/L 25.9 24.9 24.3 22.9   BUN mg/dL 8 8 10 17   CREATININE mg/dL 0.44* 0.39* 0.45* 0.44*   GLUCOSE mg/dL 95 110* 129* 112*   Estimated Creatinine Clearance: 39.5 mL/min (A) (by C-G formula based on SCr of 0.44 mg/dL (L)).  Results from last 7 days   Lab Units 21  0500 21  0255 21  0514 21  0530   CALCIUM mg/dL 9.0 9.1 9.5 9.3   ALBUMIN g/dL  --   --   --  2.50*     Results from last 7 days   Lab Units 21  0607 21  0500 21  0255   INR  3.99* 3.00* 2.46*        amLODIPine, 2.5 mg, Oral, Daily With Lunch  amoxicillin-clavulanate, 1 tablet, Oral, Q12H  atorvastatin, 20 mg, Oral, Daily  furosemide, 20 mg, Oral, Once per day  on Mon Wed Fri  insulin lispro, 0-9 Units, Subcutaneous, TID AC  losartan, 50 mg, Oral, Daily With Lunch  metFORMIN ER, 500 mg, Oral, Daily With Breakfast  sodium chloride, 10 mL, Intravenous, Q12H  triamterene-hydrochlorothiazide, 1 tablet, Oral, Daily  warfarin, 5 mg, Oral, Daily      Pharmacy to dose warfarin,     Diet Regular; Cardiac, Consistent Carbohydrate    Assessment/Plan      Active Hospital Problems    Diagnosis  POA   • **Acute urinary retention [R33.8]  Yes   • Prolonged immobilization [Z74.09]  Yes   • Left knee pain [M25.562]  Yes   • Anemia [D64.9]  Yes   • UTI (urinary tract infection) [N39.0]  Yes   • History of COVID-19- 3/2021 [Z86.16]  Yes   • Type 2 diabetes mellitus (CMS/[facility]) [E11.9]  Yes   • Hypertension [I10]  Yes      Resolved Hospital Problems   No resolved problems to display.       · Enteroccoccus UTI: Continue augmentin.  · Urinary retention: Outpatient urology follow up to address honeycutt  · Hypokalemia  · Dispositoin: Home with family.    Joshua Garcia MD  [facility]  06/02/21  11:48 EDT    Dictated portions using Dragon dictation software.    During the entire encounter, I was wearing recommended PPE including face mask and eye protection. Hand sanitization was performed prior to entering room and upon exit.

## 2021-06-02 NOTE — PLAN OF CARE
Goal Outcome Evaluation:  Plan of Care Reviewed With: patient, family  Progress: no change  Outcome Summary: Alert and VSS. Daughter at bedside.to be d/c'd at noon.

## 2021-06-03 ENCOUNTER — READMISSION MANAGEMENT (OUTPATIENT)
Dept: CALL CENTER | Facility: HOSPITAL | Age: 86
End: 2021-06-03

## 2021-06-03 NOTE — OUTREACH NOTE
Prep Survey      Responses   [facility] patient discharged from?  [facility]   Is LACE score < 7 ?  No   Emergency Room discharge w/ pulse ox?  No   Eligibility  Readm Mgmt   Discharge diagnosis  acute urinary retention   Does the patient have one of the following disease processes/diagnoses(primary or secondary)?  Other   Does the patient have Home health ordered?  Yes   What is the Home health agency?   [facility]   Is there a DME ordered?  No   Comments regarding appointments  call for apmts   Medication alerts for this patient  see AVS   Prep survey completed?  Yes          Kathia Adamson RN

## 2021-06-04 NOTE — CASE MANAGEMENT/SOCIAL WORK
Case Management Discharge Note      Final Note: Home with [facility] and family providing 24/7 care. Radha RN/CCP    Provided Post Acute Provider List?: N/A    Selected Continued Care - Discharged on 6/2/2021 Admission date: 5/28/2021 - Discharge disposition: Home or Self Care    Destination    No services have been selected for the patient.              Durable Medical Equipment    No services have been selected for the patient.              Dialysis/Infusion    No services have been selected for the patient.              Home Medical Care     Service Provider Selected Services Address Phone Fax Patient Preferred    [facility] [address] [facility] [address] [phone] --          Therapy    No services have been selected for the patient.              Community Resources    No services have been selected for the patient.                  Transportation Services  Ambulance: Other ([facility])    Final Discharge Disposition Code: 06 - home with home health care

## 2022-03-16 ENCOUNTER — OFFICE VISIT (OUTPATIENT)
Dept: OTOLARYNGOLOGY | Facility: CLINIC | Age: 87
End: 2022-03-16

## 2022-03-16 VITALS — HEIGHT: 60 IN | TEMPERATURE: 97.4 F | WEIGHT: 132.8 LBS | BODY MASS INDEX: 26.07 KG/M2

## 2022-03-16 DIAGNOSIS — H90.0 CONDUCTIVE HEARING LOSS, BILATERAL: ICD-10-CM

## 2022-03-16 DIAGNOSIS — H61.23 BILATERAL IMPACTED CERUMEN: Primary | ICD-10-CM

## 2022-03-16 PROCEDURE — 99203 OFFICE O/P NEW LOW 30 MIN: CPT | Performed by: OTOLARYNGOLOGY

## 2022-03-16 RX ORDER — LOSARTAN POTASSIUM 50 MG/1
TABLET ORAL
COMMUNITY
Start: 2021-04-08

## 2022-03-16 RX ORDER — SIMVASTATIN 40 MG
TABLET ORAL
COMMUNITY
Start: 2021-04-08

## 2022-03-16 RX ORDER — AMLODIPINE BESYLATE 2.5 MG/1
TABLET ORAL
COMMUNITY
Start: 2021-04-08

## 2022-03-16 RX ORDER — MELATONIN
1000 DAILY
COMMUNITY

## 2022-03-16 RX ORDER — LANOLIN ALCOHOL/MO/W.PET/CERES
1000 CREAM (GRAM) TOPICAL DAILY
COMMUNITY

## 2022-03-16 RX ORDER — POTASSIUM CHLORIDE 750 MG/1
10 TABLET, FILM COATED, EXTENDED RELEASE ORAL 2 TIMES DAILY
COMMUNITY

## 2022-03-16 RX ORDER — NITROFURANTOIN MACROCRYSTALS 100 MG/1
CAPSULE ORAL 4 TIMES DAILY
COMMUNITY

## 2022-03-17 ENCOUNTER — PATIENT ROUNDING (BHMG ONLY) (OUTPATIENT)
Dept: OTOLARYNGOLOGY | Facility: CLINIC | Age: 87
End: 2022-03-17

## 2022-03-17 NOTE — PROGRESS NOTES
March 17, 2022    Hello, may I speak with Rebecca Peterson?    My name is Bessy    I am  with [facility]  [address]  [place] [address] [phone].    Before we get started may I verify your date of birth? 5/18/1931    I am calling to officially welcome you to our practice and ask about your recent visit. Is this a good time to talk? yes    Tell me about your visit with us. What things went well?  patients daughters state visit went well they were pleased had no concerns        We're always looking for ways to make our patients' experiences even better. Do you have recommendations on ways we may improve?  no    Overall were you satisfied with your first visit to our practice? yes       I appreciate you taking the time to speak with me today. Is there anything else I can do for you? no      Thank you, and have a great day.

## 2022-03-23 PROCEDURE — 69210 REMOVE IMPACTED EAR WAX UNI: CPT | Performed by: OTOLARYNGOLOGY

## 2022-03-23 NOTE — PROGRESS NOTES
Patient Name: Rebecca Peterson   Visit Date: 03/16/2022   Patient ID: [id number]  Provider: Tacho Orosco MD    Sex: female  Location: [facility]   YOB: 1931  Location Address: [address] [place] [address], [address],?[place]?[address]    Primary Care Provider Pollo Dahl MD  Location Phone: [phone]    Referring Provider: Pollo Dahl MD        Chief Complaint  Cerumen Impaction (New patient ) and Hearing Loss (New patient )    Subjective    History of Present Illness  Rebecca Peterson is a 89 y.o. female who presents to [facility] today as a consult from Pollo Dahl MD.    She presents the clinic today accompanied by her daughters for evaluation of longstanding issue with hearing loss and cerumen impactions.  They were supposed to have a hearing test however due to severe cerumen impactions she was sent here for further evaluation first.  She denies any significant issues with ear disease or recurrent infections, but has had lifelong issues with earwax.  They inform me that she has been has since had this removed as this previously caused discomfort.  Has not had any purulent drainage.  Has a very hard time hearing.    Past Medical History:   Diagnosis Date   • Acid reflux    • Cataract     BILATERAL   • Dependent edema    • Diabetes mellitus (HCC)    • HL (hearing loss)    • Hyperlipidemia    • Hypertension    • Impacted cerumen    • Osteoarthritis        Past Surgical History:   Procedure Laterality Date   • ANKLE SURGERY Right    • HYSTERECTOMY     • TOTAL KNEE ARTHROPLASTY Right 3/21/2017    Procedure: TOTAL KNEE ARTHROPLASTY ;  Surgeon: Eleazar Head MD;  Location: [facility];  Service:          Current Outpatient Medications:   •  acetaminophen (TYLENOL) 325 MG tablet, Take 2 tablets by mouth Every 4 (Four) Hours As Needed for Mild Pain (1-3)., Disp: , Rfl: 0  •  bisacodyl (DULCOLAX) 10 MG suppository, Insert 1 suppository into the rectum  "Daily As Needed for Constipation., Disp: , Rfl: 0  •  cholecalciferol (VITAMIN D3) 25 MCG (1000 UT) tablet, Take 1,000 Units by mouth Daily., Disp: , Rfl:   •  furosemide (LASIX) 20 MG tablet, Take 20 mg by mouth Daily (Monday-Friday). Monday, Wednesday and Friday, Disp: , Rfl:   •  losartan (COZAAR) 50 MG tablet, , Disp: , Rfl:   •  metFORMIN ER (GLUCOPHAGE-XR) 500 MG 24 hr tablet, , Disp: , Rfl:   •  potassium chloride 10 MEQ CR tablet, Take 10 mEq by mouth 2 (Two) Times a Day., Disp: , Rfl:   •  simvastatin (ZOCOR) 40 MG tablet, , Disp: , Rfl:   •  triamterene-hydrochlorothiazide (DYAZIDE) 37.5-25 MG per capsule, , Disp: , Rfl:   •  vitamin B-12 (CYANOCOBALAMIN) 1000 MCG tablet, Take 1,000 mcg by mouth Daily., Disp: , Rfl:   •  amLODIPine (NORVASC) 2.5 MG tablet, Take 2.5 mg by mouth Daily With Lunch., Disp: , Rfl:   •  amLODIPine (NORVASC) 2.5 MG tablet, , Disp: , Rfl:   •  losartan (COZAAR) 100 MG tablet, Take 50 mg by mouth Daily With Lunch., Disp: , Rfl:   •  nitrofurantoin (MACRODANTIN) 100 MG capsule, Take  by mouth 4 (Four) Times a Day., Disp: , Rfl:   •  oxyCODONE-acetaminophen (PERCOCET) 7.5-325 MG per tablet, Take 1-2 tabs po q 4 hours prn pain, Disp: 60 tablet, Rfl: 0  •  pantoprazole (PROTONIX) 20 MG EC tablet, Take 20 mg by mouth As Needed for heartburn., Disp: , Rfl:   •  sennosides-docusate sodium (SENOKOT-S) 8.6-50 MG tablet, Take 2 tablets by mouth 2 (Two) Times a Day., Disp: , Rfl:   •  simvastatin (ZOCOR) 20 MG tablet, Take 1 tablet by mouth Every Night., Disp: 30 tablet, Rfl: 0  •  warfarin (COUMADIN) 5 MG tablet, Take 1 tablet by mouth Daily., Disp: , Rfl:      Allergies   Allergen Reactions   • Rofecoxib Nausea And Vomiting     GI UPSET       Family History   Problem Relation Age of Onset   • Cancer Brother         Social History     Social History Narrative   • Not on file       Objective     Vital Signs:   Temp 97.4 °F (36.3 °C) (Temporal)   Ht 152.4 cm (60\")   Wt 60.2 kg (132 lb 12.8 " oz)   BMI 25.94 kg/m²       Physical Exam    Ear Cerumen Removal    Date/Time: 3/23/2022 12:57 PM  Performed by: Tacho Orosco MD  Authorized by: Tacho Orosco MD     Anesthesia:  Local Anesthetic: none  Location details: right ear and left ear  Procedure type: instrumentation, suction, curette   Sedation:  Patient sedated: no            Constitutional   Appearance  · : well developed, well-nourished, alert and in no acute distress, voice clear and strong    Head  Inspection  · : no deformities or lesions  Face  Inspection  · : No facial lesions; House-Brackmann I/VI bilaterally  Palpation  · : No TMJ crepitus nor  muscle tenderness bilaterally    Eyes  Vision  Visual Fields  · : Extraocular movements are intact. No spontaneous or gaze-induced nystagmus.  Conjunctivae  · : clear  Sclerae  · : clear  Pupils and Irises  · : pupils equal, round, and reactive to light.     Ears, Nose, Mouth and Throat    Ears    External Ears  · : appearance within normal limits, no lesions present  Otoscopic Examination  · : Severe cerumen impactions removed with curettes and microscope, tympanic membrane appearance within normal limits bilaterally without perforations, well-aerated middle ears  Hearing  · : intact to conversational voice both ears  Tunning fork testing:     :    Nose    External Nose  · : appearance normal  Intranasal Exam  · : mucosa within normal limits, vestibules normal, no intranasal lesions present, septum midline, sinuses non tender to percussion  Oral Cavity    Oral Mucosa  · : oral mucosa normal without pallor or cyanosis  Lips  · : lip appearance normal  Teeth  · : normal dentition for age  Gums  · : gums pink, non-swollen, no bleeding present  Tongue  · : tongue appearance normal; normal mobility  Palate  · : hard palate normal, soft palate appearance normal with symmetric mobility    Throat    Oropharynx  · : no inflammation or lesions present, tonsils within normal  limits  Hypopharynx  · : appearance within normal limits, superior epiglottis within normal limits  Larynx  · : appearance within normal limits, vocal cords within normal limits, no lesions present    Neck  Inspection/Palpation  · : normal appearance, no masses or tenderness, trachea midline; thyroid size normal, nontender, no nodules or masses present on palpation    Respiratory  Respiratory Effort  · : breathing unlabored  Inspection of Chest  · : normal appearance, no retractions    Cardiovascular  Heart  · : regular rate and rhythm    Lymphatic  Neck  · : no lymphadenopathy present  Supraclavicular Nodes  · : no lymphadenopathy present  Preauricular Nodes  · : no lymphadenopathy present    Skin and Subcutaneous Tissue  General Inspection  · : Regarding face and neck - there are no rashes present, no lesions present, and no areas of discoloration    Neurologic  Cranial Nerves  · : cranial nerves II-XII are grossly intact bilaterally  Gait and Station  · : normal gait, able to stand without diffculty    Psychiatric  Judgement and Insight  · : judgment and insight intact  Mood and Affect  · : mood normal, affect appropriate        Assessment and Plan    Diagnoses and all orders for this visit:    1. Bilateral impacted cerumen (Primary)    2. Conductive hearing loss, bilateral    Examination today revealed cerumen impactions on both sides with complete occlusion bilaterally.  It took about 20 minutes to remove all the cerumen using the microscope, curettes, and suction.  Once the cerumen was removed the eardrums appeared normal.  Her hearing improved significantly she is very pleased with how well she could hear, as were her daughters.  I see no evidence of ear disease, and we will have her proceed with a audiogram.  I will be glad to see her back on an as-needed basis should there be any further issues and can help with.    Follow Up   No follow-ups on file.  Patient was given instructions and counseling regarding  her condition or for health maintenance advice. Please see specific information pulled into the AVS if appropriate.

## 2023-07-14 ENCOUNTER — TELEPHONE (OUTPATIENT)
Dept: OTOLARYNGOLOGY | Facility: CLINIC | Age: 88
End: 2023-07-14

## 2023-07-14 NOTE — TELEPHONE ENCOUNTER
Caller: Rebecca Peterson    Relationship: Self    Best call back number: [phone]    What is the best time to reach you: ANY    Who are you requesting to speak with (clinical staff, provider,  specific staff member): ANY    Do you know the name of the person who called: WOODY DOCKERY    What was the call regarding: PT DAUGHTER IS LOOKING TO SETUP HEARING APPT FOR PT    Is it okay if the provider responds through MyChart: N/A

## 2023-10-12 ENCOUNTER — TELEPHONE (OUTPATIENT)
Dept: ORTHOPEDIC SURGERY | Facility: CLINIC | Age: 88
End: 2023-10-12
Payer: MEDICARE

## 2023-10-12 NOTE — TELEPHONE ENCOUNTER
Caller: MICHAEL NICHOLAS    Relationship to patient: DAUGHTER    Best call back number: [phone]    Patient is needing: PATIENT IS NEEDING A NEW LEFT KNEE BRACE - DOES SHE NEED TO MAKE AN APPOINTMENT TO BE SEEN OR CAN WE ORDER ONE- PLEASE REACH OUT AND ADVISE

## 2023-10-13 NOTE — TELEPHONE ENCOUNTER
ATTEMPTED TO CALL PATIENTS DAUGHTER. NO ANSWER AND NO VM SET UP.    I HAVE MADE PATIENT AN APPOINTMENT WITH DR NASH IN DECEMBER, WHICH IS HIS FIRST OPENING.    SHE CAN CALL BACK AND SEE IF THERE ARE ANY OTHER OPTIONS AVAILABLE.    OK FOR HUB TO READ

## 2023-10-13 NOTE — TELEPHONE ENCOUNTER
Hub staff attempted to follow warm transfer process and was unsuccessful     Caller: Michael Titus    Relationship to patient: Emergency Contact (DAUGHTER.)    Best call back number: [phone]     Patient is needing: PATIENT'S DAUGHTER, MICHAEL WAS RETURNING A MISSED CALL FROM THE OFFICE IN REGARDS TO HER MOTHER REQUESTING A KNEE BRACE. I RELAYED THE MESSAGE FROM TEJAL TO MICHAEL BUT MICHAEL STATED THE TIME OF THE APPT ON 12.07.23 DOES NOT WORK FOR HER MOTHER BECAUSE IT IS TOO EARLY. MICHAEL IS REQUESTING AN APPT FOR A LATER TIME. THANK YOU!

## 2023-12-04 DIAGNOSIS — M25.562 LEFT KNEE PAIN, UNSPECIFIED CHRONICITY: Primary | ICD-10-CM

## 2023-12-07 ENCOUNTER — TELEPHONE (OUTPATIENT)
Dept: ORTHOPEDIC SURGERY | Facility: CLINIC | Age: 88
End: 2023-12-07

## 2023-12-07 NOTE — TELEPHONE ENCOUNTER
Provider: CHARITY    Caller: MICHAEL    Relationship to Patient: SELF    Pharmacy:     Phone Number: [phone]    Reason for Call: PT CALLING TO GET R/S FOR HER KNEE BRACE - THEY CAN COME IN TODAY  - APPT WAS AVAILABLE BUT NOT FOR HUB TO SCHEDULE - ATTEMPTED TO WT - PLEASE CALL BACK ASAP

## 2023-12-11 ENCOUNTER — HOSPITAL ENCOUNTER (OUTPATIENT)
Dept: GENERAL RADIOLOGY | Facility: HOSPITAL | Age: 88
Discharge: HOME | End: 2023-12-11
Admitting: ORTHOPAEDIC SURGERY
Payer: MEDICARE

## 2023-12-11 ENCOUNTER — OFFICE VISIT (OUTPATIENT)
Dept: ORTHOPEDIC SURGERY | Facility: CLINIC | Age: 88
End: 2023-12-11
Payer: MEDICARE

## 2023-12-11 VITALS — BODY MASS INDEX: 29.83 KG/M2 | HEIGHT: 61 IN | WEIGHT: 158 LBS

## 2023-12-11 DIAGNOSIS — M25.562 LEFT KNEE PAIN, UNSPECIFIED CHRONICITY: ICD-10-CM

## 2023-12-11 DIAGNOSIS — M17.32 POST-TRAUMATIC OSTEOARTHRITIS OF LEFT KNEE: Primary | ICD-10-CM

## 2023-12-11 PROCEDURE — 73560 X-RAY EXAM OF KNEE 1 OR 2: CPT

## 2023-12-11 PROCEDURE — 1160F RVW MEDS BY RX/DR IN RCRD: CPT | Performed by: PHYSICIAN ASSISTANT

## 2023-12-11 PROCEDURE — 1159F MED LIST DOCD IN RCRD: CPT | Performed by: PHYSICIAN ASSISTANT

## 2023-12-11 PROCEDURE — 99203 OFFICE O/P NEW LOW 30 MIN: CPT | Performed by: PHYSICIAN ASSISTANT
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
## 2024-02-16 ENCOUNTER — LAB REQUISITION (OUTPATIENT)
Dept: LAB | Facility: HOSPITAL | Age: 89
End: 2024-02-16
Payer: MEDICARE

## 2024-02-16 DIAGNOSIS — N39.0 URINARY TRACT INFECTION, SITE NOT SPECIFIED: ICD-10-CM

## 2024-02-16 LAB
BACTERIA UR QL AUTO: ABNORMAL /HPF
BILIRUB UR QL STRIP: NEGATIVE
CLARITY UR: ABNORMAL
COLOR UR: YELLOW
GLUCOSE UR STRIP-MCNC: NEGATIVE MG/DL
HGB UR QL STRIP.AUTO: ABNORMAL
KETONES UR QL STRIP: NEGATIVE
LEUKOCYTE ESTERASE UR QL STRIP.AUTO: ABNORMAL
NITRITE UR QL STRIP: NEGATIVE
PH UR STRIP.AUTO: 7.5 [PH] (ref 5–8)
PROT UR QL STRIP: ABNORMAL
RBC # UR STRIP: ABNORMAL /HPF
REF LAB TEST METHOD: ABNORMAL
SP GR UR STRIP: 1.02 (ref 1–1.03)
SQUAMOUS #/AREA URNS HPF: ABNORMAL /HPF
UROBILINOGEN UR QL STRIP: ABNORMAL
WBC # UR STRIP: ABNORMAL /HPF

## 2024-02-16 PROCEDURE — 87186 SC STD MICRODIL/AGAR DIL: CPT | Performed by: FAMILY MEDICINE

## 2024-02-16 PROCEDURE — 81001 URINALYSIS AUTO W/SCOPE: CPT | Performed by: FAMILY MEDICINE

## 2024-02-16 PROCEDURE — 87077 CULTURE AEROBIC IDENTIFY: CPT | Performed by: FAMILY MEDICINE

## 2024-02-16 PROCEDURE — 87086 URINE CULTURE/COLONY COUNT: CPT | Performed by: FAMILY MEDICINE

## 2024-02-21 LAB
BACTERIA SPEC AEROBE CULT: ABNORMAL
BACTERIA SPEC AEROBE CULT: ABNORMAL

## 2024-02-26 ENCOUNTER — LAB REQUISITION (OUTPATIENT)
Dept: LAB | Facility: HOSPITAL | Age: 89
End: 2024-02-26
Payer: MEDICARE

## 2024-02-26 DIAGNOSIS — R30.0 DYSURIA: ICD-10-CM

## 2024-02-26 LAB
BACTERIA UR QL AUTO: ABNORMAL /HPF
BILIRUB UR QL STRIP: NEGATIVE
CLARITY UR: ABNORMAL
COLOR UR: YELLOW
GLUCOSE UR STRIP-MCNC: NEGATIVE MG/DL
HGB UR QL STRIP.AUTO: ABNORMAL
KETONES UR QL STRIP: NEGATIVE
LEUKOCYTE ESTERASE UR QL STRIP.AUTO: ABNORMAL
NITRITE UR QL STRIP: POSITIVE
PH UR STRIP.AUTO: 6 [PH] (ref 5–8)
PROT UR QL STRIP: NEGATIVE
RBC # UR STRIP: ABNORMAL /HPF
REF LAB TEST METHOD: ABNORMAL
SP GR UR STRIP: 1.01 (ref 1–1.03)
SQUAMOUS #/AREA URNS HPF: ABNORMAL /HPF
UROBILINOGEN UR QL STRIP: ABNORMAL
WBC # UR STRIP: ABNORMAL /HPF
WBC CLUMPS # UR AUTO: ABNORMAL /HPF

## 2024-02-26 PROCEDURE — 81001 URINALYSIS AUTO W/SCOPE: CPT | Performed by: FAMILY MEDICINE

## 2024-02-26 PROCEDURE — 87186 SC STD MICRODIL/AGAR DIL: CPT | Performed by: FAMILY MEDICINE

## 2024-02-26 PROCEDURE — 87086 URINE CULTURE/COLONY COUNT: CPT | Performed by: FAMILY MEDICINE

## 2024-02-26 PROCEDURE — 87077 CULTURE AEROBIC IDENTIFY: CPT | Performed by: FAMILY MEDICINE

## 2024-02-28 LAB — BACTERIA SPEC AEROBE CULT: ABNORMAL

## (undated) DEVICE — NDL SPINE 22G 31/2IN BLK

## (undated) DEVICE — PK KN TOTL 40

## (undated) DEVICE — GLV SURG BIOGEL LTX PF 8 1/2

## (undated) DEVICE — DRSNG BRDR MEPILEX P/OP SIL 4X8IN

## (undated) DEVICE — ENCORE® LATEX ORTHO SIZE 8, STERILE LATEX POWDER-FREE SURGICAL GLOVE: Brand: ENCORE

## (undated) DEVICE — DRSNG WND GZ CURAD OIL EMULSION 3X8IN LF STRL 1PK

## (undated) DEVICE — ENCORE® LATEX ORTHO SIZE 8.5, STERILE LATEX POWDER-FREE SURGICAL GLOVE: Brand: ENCORE

## (undated) DEVICE — APPL CHLORAPREP W/TINT 26ML ORNG

## (undated) DEVICE — ANTIBACTERIAL UNDYED BRAIDED (POLYGLACTIN 910), SYNTHETIC ABSORBABLE SUTURE: Brand: COATED VICRYL

## (undated) DEVICE — GLV SURG TRIUMPH CLASSIC PF LTX 8.5 STRL

## (undated) DEVICE — DRSNG ADAPTIC 3X8

## (undated) DEVICE — SUT MNCRYL 3/0 PS2 18IN Y497G

## (undated) DEVICE — DUAL CUT SAGITTAL BLADE

## (undated) DEVICE — INTEGUSEAL MICROBIAL SEALANT: Brand: AVANOS

## (undated) DEVICE — MAT FLR ABSORBENT LG 4FT 10 2.5FT

## (undated) DEVICE — UNDERCAST PADDING: Brand: DEROYAL

## (undated) DEVICE — ADHS SKIN DERMABOND TOP ADVANCED

## (undated) DEVICE — INSTRUMENT BATTERY

## (undated) DEVICE — DRSNG BRDR MEPILEX P/OP SIL 4X12IN

## (undated) DEVICE — STPLR SKIN VISISTAT WD 35CT

## (undated) DEVICE — BNDG ELAS ELITE V/CLOSE 6IN 5YD LF STRL